# Patient Record
Sex: FEMALE | Race: WHITE | Employment: OTHER | URBAN - METROPOLITAN AREA
[De-identification: names, ages, dates, MRNs, and addresses within clinical notes are randomized per-mention and may not be internally consistent; named-entity substitution may affect disease eponyms.]

---

## 2021-06-28 ENCOUNTER — OFFICE VISIT (OUTPATIENT)
Dept: INTERNAL MEDICINE CLINIC | Facility: CLINIC | Age: 71
End: 2021-06-28
Payer: MEDICARE

## 2021-06-28 ENCOUNTER — LAB ENCOUNTER (OUTPATIENT)
Dept: LAB | Age: 71
End: 2021-06-28
Attending: INTERNAL MEDICINE
Payer: MEDICARE

## 2021-06-28 VITALS
HEART RATE: 108 BPM | DIASTOLIC BLOOD PRESSURE: 60 MMHG | OXYGEN SATURATION: 99 % | HEIGHT: 64.88 IN | SYSTOLIC BLOOD PRESSURE: 144 MMHG | TEMPERATURE: 99 F | BODY MASS INDEX: 33.22 KG/M2 | WEIGHT: 199.38 LBS | RESPIRATION RATE: 16 BRPM

## 2021-06-28 DIAGNOSIS — Z00.00 BLOOD TESTS FOR ROUTINE GENERAL PHYSICAL EXAMINATION: ICD-10-CM

## 2021-06-28 DIAGNOSIS — Z12.31 ENCOUNTER FOR SCREENING MAMMOGRAM FOR MALIGNANT NEOPLASM OF BREAST: ICD-10-CM

## 2021-06-28 DIAGNOSIS — R00.0 TACHYCARDIA: ICD-10-CM

## 2021-06-28 DIAGNOSIS — R19.03 RIGHT LOWER QUADRANT ABDOMINAL MASS: Primary | ICD-10-CM

## 2021-06-28 PROCEDURE — 99213 OFFICE O/P EST LOW 20 MIN: CPT | Performed by: INTERNAL MEDICINE

## 2021-06-28 PROCEDURE — 80061 LIPID PANEL: CPT

## 2021-06-28 PROCEDURE — 80053 COMPREHEN METABOLIC PANEL: CPT

## 2021-06-28 PROCEDURE — 83036 HEMOGLOBIN GLYCOSYLATED A1C: CPT

## 2021-06-28 PROCEDURE — 84443 ASSAY THYROID STIM HORMONE: CPT

## 2021-06-28 PROCEDURE — 85025 COMPLETE CBC W/AUTO DIFF WBC: CPT

## 2021-06-28 PROCEDURE — 36415 COLL VENOUS BLD VENIPUNCTURE: CPT

## 2021-06-28 NOTE — PATIENT INSTRUCTIONS
Please have blood tests done today  We have scheduled your mammogram and CT scan to evaluate the lump  We will call you with results  See me in 1 week for your routine physical

## 2021-06-28 NOTE — PROGRESS NOTES
Patient Office Visit    ASSESSMENT AND PLAN:   (R19.03) Right lower quadrant abdominal mass  (primary encounter diagnosis)  Plan: CT ABDOMEN(W+WO)PELVIS(CNTRST ONLY)(CPT=74178)  Note: highly suspicious for cancer. Likely GI.  Will order CT abdomen and pelvi Reflex To Free T4          Standing Status: Future          Standing Expiration Date: 6/28/2022      Lipid Panel          Standing Status: Future          Standing Expiration Date: 6/28/2022    Requested Prescriptions      No prescriptions requested or ord facility-administered medications on file prior to visit.         REVIEW OF SYSTEMS   Constitutional: has weight loss, no fatigue normal energy   HENT: normal sinuses and no mouth issues   Eyes: . normal vision no eye pain   Respiratory: normal respirations

## 2021-06-30 ENCOUNTER — HOSPITAL ENCOUNTER (OUTPATIENT)
Dept: MAMMOGRAPHY | Age: 71
Discharge: HOME | End: 2021-06-30
Attending: INTERNAL MEDICINE
Payer: MEDICARE

## 2021-06-30 ENCOUNTER — HOSPITAL ENCOUNTER (OUTPATIENT)
Dept: CT IMAGING | Age: 71
Discharge: HOME | End: 2021-06-30
Attending: INTERNAL MEDICINE
Payer: MEDICARE

## 2021-06-30 DIAGNOSIS — R19.03 RIGHT LOWER QUADRANT ABDOMINAL MASS: ICD-10-CM

## 2021-06-30 DIAGNOSIS — Z12.31 ENCOUNTER FOR SCREENING MAMMOGRAM FOR MALIGNANT NEOPLASM OF BREAST: ICD-10-CM

## 2021-06-30 PROCEDURE — 77063 BREAST TOMOSYNTHESIS BI: CPT | Performed by: INTERNAL MEDICINE

## 2021-06-30 PROCEDURE — 74178 CT ABD&PLV WO CNTR FLWD CNTR: CPT | Performed by: INTERNAL MEDICINE

## 2021-06-30 PROCEDURE — 77067 SCR MAMMO BI INCL CAD: CPT | Performed by: INTERNAL MEDICINE

## 2021-07-01 ENCOUNTER — TELEPHONE (OUTPATIENT)
Dept: INTERNAL MEDICINE CLINIC | Facility: CLINIC | Age: 71
End: 2021-07-01

## 2021-07-01 ENCOUNTER — TELEMEDICINE (OUTPATIENT)
Dept: INTERNAL MEDICINE CLINIC | Facility: CLINIC | Age: 71
End: 2021-07-01
Payer: MEDICARE

## 2021-07-01 DIAGNOSIS — R73.03 PREDIABETES: ICD-10-CM

## 2021-07-01 DIAGNOSIS — R19.03 RIGHT LOWER QUADRANT ABDOMINAL MASS: Primary | ICD-10-CM

## 2021-07-01 DIAGNOSIS — E78.2 MIXED HYPERLIPIDEMIA: ICD-10-CM

## 2021-07-01 PROCEDURE — 99213 OFFICE O/P EST LOW 20 MIN: CPT | Performed by: INTERNAL MEDICINE

## 2021-07-01 NOTE — TELEPHONE ENCOUNTER
Can you add patient in to my schedule (video visit) to discuss her CT results. I can talk to her at any time. I can call her too if she is unable to log in.     [facility] DO

## 2021-07-01 NOTE — PROGRESS NOTES
Virtual Telephone Check-In    This visit is conducted using Telemedicine with live, interactive video and audio.  Patient resides in [facility]   Patient understands and accepts financial responsibility for any deductible, co-insurance and/or co-pays associat discuss her test results.  She is anxious to know about her diagnosis  ROS:  General: Feels well overall  Skin: Denies any unusual skin lesions  Eyes: Denies blurred vision or double vision  All systems negative, except for above  Physical:  GENERAL: Alert

## 2021-07-01 NOTE — TELEPHONE ENCOUNTER
Can you let the patient know that I discussed with Dr. Karyn Duron and he recommends seeing Dr. Saleem Gonzalez instead so I have placed a referral with him    [address] [place] [address] DO

## 2021-07-01 NOTE — TELEPHONE ENCOUNTER
Please assist patient with scheduling per Dr. Aurelia Eid message below, TY  Preferably with an appointment for today if available

## 2021-07-01 NOTE — TELEPHONE ENCOUNTER
Patient scheduled     Patient can be reached at [phone]    Future Appointments   Date Time Provider Phuc Lama   7/6/2021 11:30 AM Dede Bidding, DO [facility] [place]

## 2021-07-06 ENCOUNTER — OFFICE VISIT (OUTPATIENT)
Dept: INTERNAL MEDICINE CLINIC | Facility: CLINIC | Age: 71
End: 2021-07-06
Payer: MEDICARE

## 2021-07-06 VITALS
RESPIRATION RATE: 16 BRPM | BODY MASS INDEX: 34.32 KG/M2 | HEART RATE: 100 BPM | TEMPERATURE: 98 F | OXYGEN SATURATION: 98 % | WEIGHT: 206 LBS | DIASTOLIC BLOOD PRESSURE: 68 MMHG | SYSTOLIC BLOOD PRESSURE: 132 MMHG | HEIGHT: 64.88 IN

## 2021-07-06 DIAGNOSIS — R01.1 HEART MURMUR: ICD-10-CM

## 2021-07-06 DIAGNOSIS — Z00.00 ROUTINE PHYSICAL EXAMINATION: Primary | ICD-10-CM

## 2021-07-06 DIAGNOSIS — E78.2 MIXED HYPERLIPIDEMIA: ICD-10-CM

## 2021-07-06 DIAGNOSIS — R19.03 RIGHT LOWER QUADRANT ABDOMINAL MASS: ICD-10-CM

## 2021-07-06 DIAGNOSIS — H61.22 HEARING LOSS OF LEFT EAR DUE TO CERUMEN IMPACTION: ICD-10-CM

## 2021-07-06 PROCEDURE — 99213 OFFICE O/P EST LOW 20 MIN: CPT | Performed by: INTERNAL MEDICINE

## 2021-07-06 PROCEDURE — G0439 PPPS, SUBSEQ VISIT: HCPCS | Performed by: INTERNAL MEDICINE

## 2021-07-06 NOTE — PROGRESS NOTES
Reviewed results with patient. Has an appointment with Dr. Liat Leigh.  [address] [place] [address]

## 2021-07-06 NOTE — PATIENT INSTRUCTIONS
You can schedule your echocardiogram when you have a chance to evaluate your slight murmur  Please follow up with Dr. Liat Leigh and I will keep an eye on his notes

## 2021-07-06 NOTE — PROGRESS NOTES
Patient Office Visit    ASSESSMENT AND PLAN:   (Z00.00) Routine physical examination  (primary encounter diagnosis)  Plan: reviewed recent blood tests results.  Will hold off on getting DEXA until seen by Dr. Ravi Kay    (R01.1) Heart murmur  Plan: CARD ECHO 2 APPENDECTOMY     • TOTAL ABDOM HYSTERECTOMY         Social History:  Social History    Tobacco Use      Smoking status: Never Smoker      Smokeless tobacco: Never Used    Vaping Use      Vaping Use: Never used    Alcohol use: Yes      Comment: social    Dr and LE, reflexes are normal  Skin: no rashes or bruises on visualized skin, not undressed   Psychiatric:normal mood

## 2021-07-07 ENCOUNTER — OFFICE VISIT (OUTPATIENT)
Dept: SURGERY | Facility: CLINIC | Age: 71
End: 2021-07-07
Payer: MEDICARE

## 2021-07-07 VITALS
DIASTOLIC BLOOD PRESSURE: 85 MMHG | SYSTOLIC BLOOD PRESSURE: 155 MMHG | HEART RATE: 109 BPM | TEMPERATURE: 98 F | BODY MASS INDEX: 33 KG/M2 | OXYGEN SATURATION: 98 % | WEIGHT: 199 LBS | RESPIRATION RATE: 16 BRPM

## 2021-07-07 DIAGNOSIS — R22.2 ABDOMINAL WALL MASS: Primary | ICD-10-CM

## 2021-07-07 PROCEDURE — 99204 OFFICE O/P NEW MOD 45 MIN: CPT | Performed by: SURGERY

## 2021-07-07 PROCEDURE — 88341 IMHCHEM/IMCYTCHM EA ADD ANTB: CPT | Performed by: SURGERY

## 2021-07-07 PROCEDURE — 88342 IMHCHEM/IMCYTCHM 1ST ANTB: CPT | Performed by: SURGERY

## 2021-07-07 PROCEDURE — 20206 BIOPSY MUSCLE PERQ NEEDLE: CPT | Performed by: SURGERY

## 2021-07-07 PROCEDURE — 88305 TISSUE EXAM BY PATHOLOGIST: CPT | Performed by: SURGERY

## 2021-07-07 NOTE — CONSULTS
[facility] Surgical Oncology        Patient Name:  Nelly Naranjo   YOB: 1950   Gender:  Female   Appt Date:  7/7/2021   Provider:  Robb Allen MD     PATIENT PROVIDERS  Primary Care Provider:Zoe Merida DO   Address[address]rectomy        Reviewed Social History:  Social History    Tobacco Use      Smoking status: Never Smoker      Smokeless tobacco: Never Used    Vaping Use      Vaping Use: Never used    Alcohol use: Yes      Comment: social    Drug use: Nev prepped and drapped in the usual sterile fashion. 1% lidocaine was used as local anesthetic. A #11 blade was used to puncture the skin.  The core needle was introduced into the lesion and multiple sections were obtained placed in formalin for histological a

## 2021-07-12 ENCOUNTER — OFFICE VISIT (OUTPATIENT)
Dept: INTERNAL MEDICINE CLINIC | Facility: CLINIC | Age: 71
End: 2021-07-12
Payer: MEDICARE

## 2021-07-12 ENCOUNTER — TELEPHONE (OUTPATIENT)
Dept: SURGERY | Facility: CLINIC | Age: 71
End: 2021-07-12

## 2021-07-12 VITALS
DIASTOLIC BLOOD PRESSURE: 66 MMHG | HEART RATE: 110 BPM | BODY MASS INDEX: 33.63 KG/M2 | SYSTOLIC BLOOD PRESSURE: 162 MMHG | WEIGHT: 199.38 LBS | RESPIRATION RATE: 16 BRPM | TEMPERATURE: 98 F | OXYGEN SATURATION: 100 % | HEIGHT: 64.75 IN

## 2021-07-12 DIAGNOSIS — C54.1 ENDOMETRIAL CARCINOMA (HCC): Primary | ICD-10-CM

## 2021-07-12 DIAGNOSIS — H61.22 IMPACTED CERUMEN OF LEFT EAR: Primary | ICD-10-CM

## 2021-07-12 DIAGNOSIS — R03.0 ELEVATED BP WITHOUT DIAGNOSIS OF HYPERTENSION: ICD-10-CM

## 2021-07-12 PROCEDURE — 99213 OFFICE O/P EST LOW 20 MIN: CPT | Performed by: INTERNAL MEDICINE

## 2021-07-12 NOTE — PROGRESS NOTES
Patient Office Visit    ASSESSMENT AND PLAN:   (H61.22) Impacted cerumen of left ear  (primary encounter diagnosis)  Plan: ENT - INTERNAL  Note: bleeding noted after ear wash, referral to ENT placed     (R03.0) Elevated BP without diagnosis of hypertension Problem Relation Age of Onset   • Other (lung cancer) Father      Allergies:  No Known Allergies  Current Meds:  No current outpatient medications on file prior to visit. No current facility-administered medications on file prior to visit.         REVIEW

## 2021-07-12 NOTE — TELEPHONE ENCOUNTER
PATH: atypical endometrioid proliferation,  suspicious for endometrioid adenocarcinoma. Called patient and left message for her to call us back. We will further discuss at our upcoming tumor board. CT chest and  ordered.

## 2021-07-13 ENCOUNTER — HOSPITAL ENCOUNTER (OUTPATIENT)
Dept: CT IMAGING | Age: 71
Discharge: HOME | End: 2021-07-13
Attending: SURGERY
Payer: MEDICARE

## 2021-07-13 ENCOUNTER — HOSPITAL ENCOUNTER (OUTPATIENT)
Facility: HOSPITAL | Age: 71
Setting detail: OBSERVATION
Discharge: HOME | End: 2021-07-14
Attending: EMERGENCY MEDICINE | Admitting: HOSPITALIST
Payer: MEDICARE

## 2021-07-13 ENCOUNTER — APPOINTMENT (OUTPATIENT)
Dept: ULTRASOUND IMAGING | Facility: HOSPITAL | Age: 71
End: 2021-07-13
Attending: EMERGENCY MEDICINE
Payer: MEDICARE

## 2021-07-13 ENCOUNTER — TELEPHONE (OUTPATIENT)
Dept: SURGERY | Facility: CLINIC | Age: 71
End: 2021-07-13

## 2021-07-13 ENCOUNTER — LAB ENCOUNTER (OUTPATIENT)
Dept: LAB | Age: 71
End: 2021-07-13
Attending: SURGERY
Payer: MEDICARE

## 2021-07-13 DIAGNOSIS — I26.99 ACUTE PULMONARY EMBOLISM, UNSPECIFIED PULMONARY EMBOLISM TYPE, UNSPECIFIED WHETHER ACUTE COR PULMONALE PRESENT (HCC): Primary | ICD-10-CM

## 2021-07-13 DIAGNOSIS — C54.1 ENDOMETRIAL CARCINOMA (HCC): ICD-10-CM

## 2021-07-13 LAB
ALBUMIN SERPL-MCNC: 3.9 G/DL (ref 3.4–5)
ALBUMIN/GLOB SERPL: 1 {RATIO} (ref 1–2)
ALP LIVER SERPL-CCNC: 69 U/L
ALT SERPL-CCNC: 16 U/L
ANION GAP SERPL CALC-SCNC: 6 MMOL/L (ref 0–18)
APTT PPP: 33.7 SECONDS (ref 25.4–36.1)
AST SERPL-CCNC: 11 U/L (ref 15–37)
BASOPHILS # BLD AUTO: 0.09 X10(3) UL (ref 0–0.2)
BASOPHILS NFR BLD AUTO: 0.7 %
BILIRUB SERPL-MCNC: 0.5 MG/DL (ref 0.1–2)
BUN BLD-MCNC: 13 MG/DL (ref 7–18)
BUN/CREAT SERPL: 16.3 (ref 10–20)
CALCIUM BLD-MCNC: 8.9 MG/DL (ref 8.5–10.1)
CANCER AG125 SERPL-ACNC: 93.6 U/ML (ref ?–35)
CHLORIDE SERPL-SCNC: 108 MMOL/L (ref 98–112)
CO2 SERPL-SCNC: 24 MMOL/L (ref 21–32)
CREAT BLD-MCNC: 0.8 MG/DL
DEPRECATED RDW RBC AUTO: 40.9 FL (ref 35.1–46.3)
EOSINOPHIL # BLD AUTO: 0.16 X10(3) UL (ref 0–0.7)
EOSINOPHIL NFR BLD AUTO: 1.2 %
ERYTHROCYTE [DISTWIDTH] IN BLOOD BY AUTOMATED COUNT: 13.2 % (ref 11–15)
GLOBULIN PLAS-MCNC: 3.9 G/DL (ref 2.8–4.4)
GLUCOSE BLD-MCNC: 141 MG/DL (ref 70–99)
HCT VFR BLD AUTO: 38.9 %
HGB BLD-MCNC: 12.3 G/DL
IMM GRANULOCYTES # BLD AUTO: 0.06 X10(3) UL (ref 0–1)
IMM GRANULOCYTES NFR BLD: 0.4 %
INR BLD: 1.11 (ref 0.89–1.11)
LYMPHOCYTES # BLD AUTO: 1.8 X10(3) UL (ref 1–4)
LYMPHOCYTES NFR BLD AUTO: 13.4 %
M PROTEIN MFR SERPL ELPH: 7.8 G/DL (ref 6.4–8.2)
MCH RBC QN AUTO: 26.7 PG (ref 26–34)
MCHC RBC AUTO-ENTMCNC: 31.6 G/DL (ref 31–37)
MCV RBC AUTO: 84.4 FL
MONOCYTES # BLD AUTO: 0.81 X10(3) UL (ref 0.1–1)
MONOCYTES NFR BLD AUTO: 6 %
NEUTROPHILS # BLD AUTO: 10.54 X10 (3) UL (ref 1.5–7.7)
NEUTROPHILS # BLD AUTO: 10.54 X10(3) UL (ref 1.5–7.7)
NEUTROPHILS NFR BLD AUTO: 78.3 %
NT-PROBNP SERPL-MCNC: 196 PG/ML (ref ?–125)
OSMOLALITY SERPL CALC.SUM OF ELEC: 288 MOSM/KG (ref 275–295)
PLATELET # BLD AUTO: 282 10(3)UL (ref 150–450)
POTASSIUM SERPL-SCNC: 3.6 MMOL/L (ref 3.5–5.1)
PSA SERPL DL<=0.01 NG/ML-MCNC: 14.6 SECONDS (ref 12.4–14.6)
RBC # BLD AUTO: 4.61 X10(6)UL
SODIUM SERPL-SCNC: 138 MMOL/L (ref 136–145)
TROPONIN I SERPL-MCNC: <0.045 NG/ML (ref ?–0.04)
WBC # BLD AUTO: 13.5 X10(3) UL (ref 4–11)

## 2021-07-13 PROCEDURE — 36415 COLL VENOUS BLD VENIPUNCTURE: CPT

## 2021-07-13 PROCEDURE — 86304 IMMUNOASSAY TUMOR CA 125: CPT

## 2021-07-13 PROCEDURE — 71260 CT THORAX DX C+: CPT | Performed by: SURGERY

## 2021-07-13 PROCEDURE — 99220 INITIAL OBSERVATION CARE,LEVL III: CPT | Performed by: INTERNAL MEDICINE

## 2021-07-13 PROCEDURE — 93970 EXTREMITY STUDY: CPT | Performed by: EMERGENCY MEDICINE

## 2021-07-13 RX ORDER — METOCLOPRAMIDE HYDROCHLORIDE 5 MG/ML
10 INJECTION INTRAMUSCULAR; INTRAVENOUS EVERY 8 HOURS PRN
Status: DISCONTINUED | OUTPATIENT
Start: 2021-07-13 | End: 2021-07-14

## 2021-07-13 RX ORDER — IBUPROFEN 200 MG
200 TABLET ORAL EVERY 6 HOURS PRN
Status: ON HOLD | COMMUNITY
End: 2021-07-14

## 2021-07-13 RX ORDER — ENOXAPARIN SODIUM 100 MG/ML
1 INJECTION SUBCUTANEOUS ONCE
Status: COMPLETED | OUTPATIENT
Start: 2021-07-13 | End: 2021-07-13

## 2021-07-13 RX ORDER — ONDANSETRON 2 MG/ML
4 INJECTION INTRAMUSCULAR; INTRAVENOUS EVERY 6 HOURS PRN
Status: DISCONTINUED | OUTPATIENT
Start: 2021-07-13 | End: 2021-07-14

## 2021-07-13 RX ORDER — ENOXAPARIN SODIUM 100 MG/ML
1 INJECTION SUBCUTANEOUS
Status: DISCONTINUED | OUTPATIENT
Start: 2021-07-14 | End: 2021-07-14

## 2021-07-13 RX ORDER — ACETAMINOPHEN 325 MG/1
650 TABLET ORAL EVERY 6 HOURS PRN
Status: DISCONTINUED | OUTPATIENT
Start: 2021-07-13 | End: 2021-07-14

## 2021-07-13 RX ORDER — POLYETHYLENE GLYCOL 3350 17 G/17G
17 POWDER, FOR SOLUTION ORAL DAILY PRN
Status: DISCONTINUED | OUTPATIENT
Start: 2021-07-13 | End: 2021-07-14

## 2021-07-13 RX ORDER — ASPIRIN 325 MG
325 TABLET, DELAYED RELEASE (ENTERIC COATED) ORAL DAILY PRN
Status: ON HOLD | COMMUNITY
End: 2021-07-14

## 2021-07-13 RX ORDER — ONDANSETRON 2 MG/ML
4 INJECTION INTRAMUSCULAR; INTRAVENOUS EVERY 4 HOURS PRN
Status: ACTIVE | OUTPATIENT
Start: 2021-07-13 | End: 2021-07-13

## 2021-07-13 RX ORDER — MELATONIN
3 NIGHTLY PRN
Status: DISCONTINUED | OUTPATIENT
Start: 2021-07-13 | End: 2021-07-14

## 2021-07-13 RX ORDER — DOCUSATE SODIUM 100 MG/1
100 CAPSULE, LIQUID FILLED ORAL 2 TIMES DAILY PRN
Status: DISCONTINUED | OUTPATIENT
Start: 2021-07-13 | End: 2021-07-14

## 2021-07-13 NOTE — ED PROVIDER NOTES
Patient Seen in: [facility] Emergency Department      History   Patient presents with:  Abnormal Labs    Stated Complaint: pulmonary embolism dx by CT scan this am     HPI/Subjective:   HPI    19-year-old female with recent diagnosis of abdominal wa Mouth/Throat:      Mouth: Mucous membranes are moist.   Eyes:      Extraocular Movements: Extraocular movements intact. Pupils: Pupils are equal, round, and reactive to light. Cardiovascular:      Rate and Rhythm: Regular rhythm.  Tachycardia present Please view results for these tests on the individual orders. RAINBOW DRAW BLUE   RAINBOW DRAW LAVENDER   RAINBOW DRAW LIGHT GREEN   RAINBOW DRAW GOLD     EKG    Rate, intervals and axes as noted on EKG Report.   Rate: 99  Rhythm: Sinus Rhythm  Readin vertebral bodies. .   CONCLUSION:  3 mm ground-glass nodule in the right upper lobe. Enlarged heterogeneous thyroid gland with calcification in the left lobe. Patient may benefit from follow-up with dedicated thyroid ultrasound.   No enlarged mediastinal o PLEURA:  No pneumothorax or effusion. THORACIC AORTA:  No aneurysm. CHEST WALL:  No enlarged axillary adenopathy.   LIMITED ABDOMEN:  Stable when compared to 6/30/2021 BONES:  Hypertrophic degenerative changes thoracic spine with multilevel endplate spurr dilatation, or atrophy. SPLEEN:  No enlargement or focal lesion. KIDNEYS:  No mass, obstruction, or calcification. ADRENALS:  No mass or enlargement. AORTA/VASCULAR:  No aneurysm. RETROPERITONEUM:  No mass or adenopathy.   BOWEL/MESENTERY:  No visible Routine CC, MLO, and XCCL views of both breasts were obtained. Standard 2D and additional multiplane thin sections of the breasts were obtained for the purpose of Tomosynthesis evaluation.   The images were reconstructed and reviewed on the dedicated Tomos admission. A total of 30 minutes of critical care time (exclusive of billable procedures) was administered to manage the patient's critical imaging findings due to her pulmonary embolism.   This involved direct patient intervention, complex decision dylan

## 2021-07-13 NOTE — TELEPHONE ENCOUNTER
Spoke to patient and informed her of CT chest result of a pulmonary embolism. Informed her to go to ED for further evaluation. All patient's questions answered and pt stated understanding.

## 2021-07-13 NOTE — TELEPHONE ENCOUNTER
LVM and called patient on all lines listed with no answer. Calling patient to inform her of critical CT result. Will continue reaching out to patient.

## 2021-07-13 NOTE — H&P
[facility]  History and Physical     Sesar Longest Patient Status:  Emergency    6/3/1950 MRN [id number]   Location [address] [facility] Attending Angelica Sifuentes Day # 0 PCP Sonal Corrinne Bjornstad, DO     Chief Oral Tab, Take 200 mg by mouth every 6 (six) hours as needed for Pain or Fever., Disp: , Rfl:   aspirin  MG Oral Tab EC, Take 325 mg by mouth daily as needed for Pain., Disp: , Rfl:       Review of Systems:   A comprehensive 14 point review of system hours.    Inflammatory Markers  No results for input(s): CRP, ALY, LDH, DDIMER in the last 168 hours. Imaging: Imaging data reviewed in Epic. ASSESSMENT / PLAN:     1. Right upper lobe pulmonary embolism  1.  Full dose Lovenox which is okay with Dr. Xuan Carrillo

## 2021-07-14 ENCOUNTER — APPOINTMENT (OUTPATIENT)
Dept: CV DIAGNOSTICS | Facility: HOSPITAL | Age: 71
End: 2021-07-14
Attending: INTERNAL MEDICINE
Payer: MEDICARE

## 2021-07-14 VITALS
DIASTOLIC BLOOD PRESSURE: 56 MMHG | HEIGHT: 65 IN | RESPIRATION RATE: 16 BRPM | OXYGEN SATURATION: 96 % | BODY MASS INDEX: 32.8 KG/M2 | HEART RATE: 88 BPM | WEIGHT: 196.88 LBS | SYSTOLIC BLOOD PRESSURE: 114 MMHG | TEMPERATURE: 99 F

## 2021-07-14 LAB
ANION GAP SERPL CALC-SCNC: 4 MMOL/L (ref 0–18)
BASOPHILS # BLD AUTO: 0.09 X10(3) UL (ref 0–0.2)
BASOPHILS NFR BLD AUTO: 1 %
BUN BLD-MCNC: 12 MG/DL (ref 7–18)
BUN/CREAT SERPL: 16.7 (ref 10–20)
CALCIUM BLD-MCNC: 8.6 MG/DL (ref 8.5–10.1)
CHLORIDE SERPL-SCNC: 109 MMOL/L (ref 98–112)
CO2 SERPL-SCNC: 28 MMOL/L (ref 21–32)
CREAT BLD-MCNC: 0.72 MG/DL
D-DIMER: 3.87 UG/ML FEU (ref ?–0.71)
DEPRECATED RDW RBC AUTO: 41.7 FL (ref 35.1–46.3)
EOSINOPHIL # BLD AUTO: 0.28 X10(3) UL (ref 0–0.7)
EOSINOPHIL NFR BLD AUTO: 3 %
ERYTHROCYTE [DISTWIDTH] IN BLOOD BY AUTOMATED COUNT: 13.2 % (ref 11–15)
GLUCOSE BLD-MCNC: 107 MG/DL (ref 70–99)
HCT VFR BLD AUTO: 35 %
HGB BLD-MCNC: 11 G/DL
IMM GRANULOCYTES # BLD AUTO: 0.03 X10(3) UL (ref 0–1)
IMM GRANULOCYTES NFR BLD: 0.3 %
LYMPHOCYTES # BLD AUTO: 2.43 X10(3) UL (ref 1–4)
LYMPHOCYTES NFR BLD AUTO: 26.2 %
MCH RBC QN AUTO: 27 PG (ref 26–34)
MCHC RBC AUTO-ENTMCNC: 31.4 G/DL (ref 31–37)
MCV RBC AUTO: 86 FL
MONOCYTES # BLD AUTO: 0.71 X10(3) UL (ref 0.1–1)
MONOCYTES NFR BLD AUTO: 7.6 %
NEUTROPHILS # BLD AUTO: 5.75 X10 (3) UL (ref 1.5–7.7)
NEUTROPHILS # BLD AUTO: 5.75 X10(3) UL (ref 1.5–7.7)
NEUTROPHILS NFR BLD AUTO: 61.9 %
OSMOLALITY SERPL CALC.SUM OF ELEC: 292 MOSM/KG (ref 275–295)
PLATELET # BLD AUTO: 233 10(3)UL (ref 150–450)
POTASSIUM SERPL-SCNC: 3.9 MMOL/L (ref 3.5–5.1)
RBC # BLD AUTO: 4.07 X10(6)UL
SODIUM SERPL-SCNC: 141 MMOL/L (ref 136–145)
WBC # BLD AUTO: 9.3 X10(3) UL (ref 4–11)

## 2021-07-14 PROCEDURE — 93306 TTE W/DOPPLER COMPLETE: CPT | Performed by: INTERNAL MEDICINE

## 2021-07-14 PROCEDURE — 99205 OFFICE O/P NEW HI 60 MIN: CPT | Performed by: INTERNAL MEDICINE

## 2021-07-14 PROCEDURE — 99217 OBSERVATION CARE DISCHARGE: CPT | Performed by: INTERNAL MEDICINE

## 2021-07-14 RX ORDER — ENOXAPARIN SODIUM 100 MG/ML
1 INJECTION SUBCUTANEOUS 2 TIMES DAILY
Qty: 53.4 ML | Refills: 0 | Status: SHIPPED | OUTPATIENT
Start: 2021-07-14 | End: 2021-07-28

## 2021-07-14 NOTE — CONSULTS
[facility] Hematology and Oncology Consult Note    Reason for Consult: PE, Recurrent Endometrial cancer  Medical Record Number: [id number]   CSN: [id number]   Referring Physician: No ref.  provider found  PCP: Martin Villa DO    History of Present Illness (MIRALAX) powder packet 17 g, 17 g, Oral, Daily PRN    Past Medical History:   Diagnosis Date   • Cancer ([facility] [address]     abdominal tumor   • Hearing impairment    • Pulmonary embolism [facility])      Past Surgical History:   Procedure Laterality Date   • APPENDECTOMY likely a soft tissue neoplasm and biopsy is recommended. 2. There is regional enlarged lymph node in right external iliac region. 3. There is a soft tissue mass within rectus sheath in the left lower quadrant which could be a metastasis as well.    4. T BID recommended until surgery  · Recommend temporary IVC filter since VTE is relatively acute  · After surgery, can consider switching over to Xarelto   · Duration of anticoagulation will be dictated by cancer outcomes and BMI  · Avoid hormonal agents, pacheco

## 2021-07-14 NOTE — PROGRESS NOTES
07/13/21 2003 07/13/21 2005 07/13/21 2006   Vital Signs   Resp 20 20 20   Respiratory Quality Normal Normal Normal   /63 119/84 104/64   BP Location Left arm Left arm Left arm   BP Method Automatic Automatic Automatic   Patient Position Lying Sitt

## 2021-07-14 NOTE — PROGRESS NOTES
[facility]  Progress Note     [place] Patient Status:  Observation    6/3/1950 MRN [id number]   [facility] 2NE-A Attending Jose Maria Long MD   Hosp Day # 0 PCP Zoe Cartwright DO     Chief Complaint: PE    S: Patient re COVID19    Pro-Calcitonin  No results for input(s): PCT in the last 168 hours. Cardiac  Recent Labs   Lab 07/13/21  1359   TROP <0.045   PBNP 196*       Creatinine Kinase  No results for input(s): CK in the last 168 hours.     Inflammatory Markers  No re

## 2021-07-14 NOTE — PROGRESS NOTES
Assumed care of the patient at 2000. Patient alert and oriented x4. Adequate saturation on RA. LBM 7/13. Denies any pain. No SOB, N/V/D. Call light within reach.

## 2021-07-14 NOTE — PLAN OF CARE
Pt received at 0730 resting in bed. A&Ox4. On room air saturating >95%. Sinus rhythm on the monitor. Up with standby assist. No c/o pain or shortness of breath. Hematology consult to see today, paged Dr. Flash Howard for new consult. Subcutaneous lovenox BID.  Edu

## 2021-07-14 NOTE — CM/SW NOTE
Call placed to patient's [facility] [phone] to check on cost of lovenox that was electronically sent earlier toay. Cost with insurance $146. oo; pharmacy open till 9:00pm today

## 2021-07-14 NOTE — DISCHARGE SUMMARY
[facility] HOSPITALIST  DISCHARGE SUMMARY     Arianna Veras Patient Status:  Observation    6/3/1950 MRN [id number]   [facility] 2NE-A Attending No att. providers found   Hosp Day # 0 PCP Zoe Diaz DO     Date of Admission: 20 follow up with hematology and surgical oncology as outpatient. Lace+ Score: 40  59-90 High Risk  29-58 Medium Risk  0-28   Low Risk         TCM Follow-Up Recommendation:  LACE > 58:  High Risk of readmission after discharge from the hospital.  [facility] INSTRUCTIONS: See electronic chart    Tiffany Fernández MD    Time spent:  > 30 minutes

## 2021-07-15 LAB
ATRIAL RATE: 99 BPM
P AXIS: 14 DEGREES
P-R INTERVAL: 162 MS
Q-T INTERVAL: 334 MS
QRS DURATION: 82 MS
QTC CALCULATION (BEZET): 428 MS
R AXIS: -36 DEGREES
T AXIS: 36 DEGREES
VENTRICULAR RATE: 99 BPM

## 2021-07-27 NOTE — PROGRESS NOTES
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 Interval History:   7/28/21: Follow up for a RUL PE (dx 7/13/21) suspected to be 2/2 malignancy. She is currently on lovenox 90 mg BID given upcoming surgery.  She is injecting in her Left abdomen and has some hematoma scleral icterus  CV: RRR S1S2 no murmurs  Extremities: No edema   Lungs: CTAB, no increased work of breathing  Abd: ~20-30 cm RLQ mass.  Hematomas in LLQ  Neuro: CN: II-XII grossly intact      Results:  Lab Results   Component Value Date    WBC 9.3 07/14/20 proliferation with focal squamous morules.   Immunostains for estrogen receptor (ER) and GATA3 were performed.  The lesional cells are positive for ER and negative for GATA3.     The histologic and immunohistochemical findings are consistent with an atypic

## 2021-07-28 ENCOUNTER — OFFICE VISIT (OUTPATIENT)
Dept: SURGERY | Facility: CLINIC | Age: 71
End: 2021-07-28
Payer: MEDICARE

## 2021-07-28 ENCOUNTER — OFFICE VISIT (OUTPATIENT)
Dept: HEMATOLOGY/ONCOLOGY | Facility: HOSPITAL | Age: 71
End: 2021-07-28
Attending: INTERNAL MEDICINE
Payer: MEDICARE

## 2021-07-28 ENCOUNTER — TELEPHONE (OUTPATIENT)
Dept: SURGERY | Facility: CLINIC | Age: 71
End: 2021-07-28

## 2021-07-28 VITALS
OXYGEN SATURATION: 97 % | TEMPERATURE: 99 F | BODY MASS INDEX: 33 KG/M2 | HEART RATE: 115 BPM | WEIGHT: 195.38 LBS | RESPIRATION RATE: 16 BRPM | SYSTOLIC BLOOD PRESSURE: 146 MMHG | DIASTOLIC BLOOD PRESSURE: 84 MMHG

## 2021-07-28 VITALS
WEIGHT: 197 LBS | SYSTOLIC BLOOD PRESSURE: 148 MMHG | TEMPERATURE: 98 F | DIASTOLIC BLOOD PRESSURE: 88 MMHG | OXYGEN SATURATION: 99 % | BODY MASS INDEX: 33 KG/M2 | HEART RATE: 111 BPM | RESPIRATION RATE: 16 BRPM

## 2021-07-28 DIAGNOSIS — C79.9 METASTASIS FROM MALIGNANT NEOPLASM OF UTERUS (HCC): Primary | ICD-10-CM

## 2021-07-28 DIAGNOSIS — C79.89 SECONDARY MALIGNANT NEOPLASM OF SOFT TISSUES OF ABDOMEN (HCC): ICD-10-CM

## 2021-07-28 DIAGNOSIS — I26.99 ACUTE PULMONARY EMBOLISM, UNSPECIFIED PULMONARY EMBOLISM TYPE, UNSPECIFIED WHETHER ACUTE COR PULMONALE PRESENT (HCC): Primary | ICD-10-CM

## 2021-07-28 DIAGNOSIS — C55 METASTASIS FROM MALIGNANT NEOPLASM OF UTERUS (HCC): Primary | ICD-10-CM

## 2021-07-28 PROCEDURE — 99214 OFFICE O/P EST MOD 30 MIN: CPT | Performed by: INTERNAL MEDICINE

## 2021-07-28 PROCEDURE — 1111F DSCHRG MED/CURRENT MED MERGE: CPT | Performed by: SURGERY

## 2021-07-28 PROCEDURE — 99214 OFFICE O/P EST MOD 30 MIN: CPT | Performed by: SURGERY

## 2021-07-28 RX ORDER — ENOXAPARIN SODIUM 100 MG/ML
1 INJECTION SUBCUTANEOUS 2 TIMES DAILY
Qty: 53.4 ML | Refills: 0 | Status: ON HOLD | OUTPATIENT
Start: 2021-07-28 | End: 2021-08-11

## 2021-07-28 NOTE — H&P (VIEW-ONLY)
[facility] Surgical Oncology        Patient Name:  Cristy Block   YOB: 1950   Gender:  Female   Appt Date:  7/28/2021   Provider:  Milady Clements MD     PATIENT PROVIDERS  Primary Care Provider:Zoe Bhat DO   Address: [address]  Current Outpatient Medications:   •  enoxaparin 100 MG/ML Subcutaneous Solution, Inject 0.89 mL (89 mg total) into the skin 2 (two) times daily. , Disp: 53.4 mL, Rfl: 0     Allergies Reviewed:  No Known Allergies     History:  Reviewed:  Past Medical Histor jaundice. Document Review:  Final Diagnosis:   Abdominal wall mass, biopsy:  -Atypical endometrioid proliferation.           Procedure(s):  None     Assessment / Plan:  Uterine carcinoma metastatic to abdominal wall    Findings were revisited with the

## 2021-07-28 NOTE — H&P
[facility]        Patient Name:  Brian Goodwin   YOB: 1950   Gender:  Female   Appt Date:  7/28/2021   Provider:  Dionte Parker MD     PATIENT PROVIDERS  Primary Care Provider:Zoe Cruz DO   Address: [address]  Current Outpatient Medications:   •  enoxaparin 100 MG/ML Subcutaneous Solution, Inject 0.89 mL (89 mg total) into the skin 2 (two) times daily. , Disp: 53.4 mL, Rfl: 0     Allergies Reviewed:  No Known Allergies     History:  Reviewed:  Past Medical Histor jaundice. Document Review:  Final Diagnosis:   Abdominal wall mass, biopsy:  -Atypical endometrioid proliferation.           Procedure(s):  None     Assessment / Plan:  Uterine carcinoma metastatic to abdominal wall    Findings were revisited with the

## 2021-07-28 NOTE — TELEPHONE ENCOUNTER
Spoke with patient and gave her a surgery date of August 10, 2021. I let her know they would be calling her with a time.   I also informed her that she would have the IVC filter placed on August 5 and someone would reach out to her with further details per

## 2021-07-29 ENCOUNTER — TELEPHONE (OUTPATIENT)
Dept: HEMATOLOGY/ONCOLOGY | Facility: HOSPITAL | Age: 71
End: 2021-07-29

## 2021-07-29 DIAGNOSIS — C55 METASTASIS FROM MALIGNANT NEOPLASM OF UTERUS (HCC): Primary | ICD-10-CM

## 2021-07-29 DIAGNOSIS — C79.89 SECONDARY MALIGNANT NEOPLASM OF SOFT TISSUES OF ABDOMEN (HCC): ICD-10-CM

## 2021-07-29 DIAGNOSIS — C79.9 METASTASIS FROM MALIGNANT NEOPLASM OF UTERUS (HCC): Primary | ICD-10-CM

## 2021-07-29 NOTE — TELEPHONE ENCOUNTER
Sheila calling regarding prescription   enoxaparin 100 MG/ML Subcutaneous Solution 53.4 mL     States it requires a Pre Authorization.     To make Urgent  For quanties over ride    [address]  [address]  [id number]

## 2021-07-30 ENCOUNTER — TELEPHONE (OUTPATIENT)
Dept: HEMATOLOGY/ONCOLOGY | Facility: HOSPITAL | Age: 71
End: 2021-07-30

## 2021-07-30 NOTE — TELEPHONE ENCOUNTER
[facility] Pharmacist told patient to put .9 into the syringe. But patient has been injecting the entire amount. Patient did not remember being told to not use the entire amount.     Patient missed morning shot because she cannot  prescription from [facility]

## 2021-07-30 NOTE — TELEPHONE ENCOUNTER
Mallory Torres RN  P Edw Tom Noah Rns         Previous Messages       ----- Message -----   From: Enid Vigil RN   Sent: 7/30/2021   9:24 AM CDT   To: Dara Kulkarni RN     Hi Joie, it's me again with the same patient. Roverto Soto she keeps tellin

## 2021-08-03 ENCOUNTER — TELEPHONE (OUTPATIENT)
Dept: INTERNAL MEDICINE CLINIC | Facility: CLINIC | Age: 71
End: 2021-08-03

## 2021-08-03 NOTE — TELEPHONE ENCOUNTER
Pt scheduled for pre-op clearance on 8/4/21.  Paperwork and admission testing info in purple pre-op folder

## 2021-08-04 ENCOUNTER — ANESTHESIA EVENT (OUTPATIENT)
Dept: SURGERY | Facility: HOSPITAL | Age: 71
DRG: 749 | End: 2021-08-04
Payer: MEDICARE

## 2021-08-04 ENCOUNTER — OFFICE VISIT (OUTPATIENT)
Dept: INTERNAL MEDICINE CLINIC | Facility: CLINIC | Age: 71
End: 2021-08-04
Payer: MEDICARE

## 2021-08-04 ENCOUNTER — LABORATORY ENCOUNTER (OUTPATIENT)
Dept: LAB | Age: 71
End: 2021-08-04
Attending: SURGERY
Payer: MEDICARE

## 2021-08-04 ENCOUNTER — TELEPHONE (OUTPATIENT)
Dept: INTERNAL MEDICINE CLINIC | Facility: CLINIC | Age: 71
End: 2021-08-04

## 2021-08-04 VITALS
SYSTOLIC BLOOD PRESSURE: 128 MMHG | WEIGHT: 196 LBS | DIASTOLIC BLOOD PRESSURE: 72 MMHG | HEIGHT: 65 IN | HEART RATE: 110 BPM | TEMPERATURE: 98 F | BODY MASS INDEX: 32.65 KG/M2 | RESPIRATION RATE: 16 BRPM | OXYGEN SATURATION: 96 %

## 2021-08-04 DIAGNOSIS — Z01.818 PREOP EXAM FOR INTERNAL MEDICINE: Primary | ICD-10-CM

## 2021-08-04 DIAGNOSIS — R73.03 PREDIABETES: ICD-10-CM

## 2021-08-04 DIAGNOSIS — C79.9 METASTASIS FROM MALIGNANT NEOPLASM OF UTERUS (HCC): ICD-10-CM

## 2021-08-04 DIAGNOSIS — I26.99 OTHER ACUTE PULMONARY EMBOLISM WITHOUT ACUTE COR PULMONALE (HCC): ICD-10-CM

## 2021-08-04 DIAGNOSIS — E78.2 MIXED HYPERLIPIDEMIA: ICD-10-CM

## 2021-08-04 DIAGNOSIS — C55 METASTASIS FROM MALIGNANT NEOPLASM OF UTERUS (HCC): ICD-10-CM

## 2021-08-04 PROBLEM — R22.2 ABDOMINAL WALL MASS: Status: RESOLVED | Noted: 2021-07-01 | Resolved: 2021-08-04

## 2021-08-04 LAB
ANTIBODY SCREEN: NEGATIVE
RH BLOOD TYPE: POSITIVE

## 2021-08-04 PROCEDURE — 99214 OFFICE O/P EST MOD 30 MIN: CPT | Performed by: INTERNAL MEDICINE

## 2021-08-04 PROCEDURE — 86850 RBC ANTIBODY SCREEN: CPT

## 2021-08-04 PROCEDURE — 86901 BLOOD TYPING SEROLOGIC RH(D): CPT

## 2021-08-04 PROCEDURE — 86900 BLOOD TYPING SEROLOGIC ABO: CPT

## 2021-08-04 NOTE — PROGRESS NOTES
Preoperative evaluation    Patient presents with:  Pre-Op: 8/10 Dr. Jason Chapa - resection of R lower abdomen      Oneida Dotson is a 70year old female who presents for a pre-operative physical exam.   HPI related to surgery:   She  has had previous anesthes use: Never    PHYSICAL EXAM:   /72   Pulse 110   Temp 98 °F (36.7 °C)   Resp 16   Ht 5' 5\" (1.651 m)   Wt 196 lb (88.9 kg)   SpO2 96%   BMI 32.62 kg/m²    GENERAL: Alert and oriented, well developed, well nourished,in no apparent distress  SKIN: no with oncology, Dr. Mingo Gregg     (E78.2) Mixed hyperlipidemia  Plan: diet controlled     (R73.03) Prediabetes  Plan: diet controlled       Patient Care Team:  Jhon Alvarenga DO as PCP - General (Internal Medicine)  Lydia Vazquez MD (Surgical Oncology)

## 2021-08-04 NOTE — PAT NURSING NOTE
Chart reviewed by anesthesiologist, Dr. Fatou Guzman for abnormal EKG. Received an order for medical clearance. Faxed this request to the surgeon and Dr. Erika Ohara. Received fax confirmation. Patient has an appointment with Dr. Foy Jeans today for medical clearance.

## 2021-08-05 ENCOUNTER — HOSPITAL ENCOUNTER (OUTPATIENT)
Dept: INTERVENTIONAL RADIOLOGY/VASCULAR | Facility: HOSPITAL | Age: 71
Discharge: HOME | End: 2021-08-05
Attending: INTERNAL MEDICINE | Admitting: INTERNAL MEDICINE
Payer: MEDICARE

## 2021-08-05 VITALS
DIASTOLIC BLOOD PRESSURE: 42 MMHG | SYSTOLIC BLOOD PRESSURE: 100 MMHG | HEART RATE: 88 BPM | RESPIRATION RATE: 23 BRPM | OXYGEN SATURATION: 100 % | TEMPERATURE: 99 F

## 2021-08-05 DIAGNOSIS — I26.99 ACUTE PULMONARY EMBOLISM, UNSPECIFIED PULMONARY EMBOLISM TYPE, UNSPECIFIED WHETHER ACUTE COR PULMONALE PRESENT (HCC): ICD-10-CM

## 2021-08-05 LAB — INR: 1.1 (ref 0.8–1.3)

## 2021-08-05 PROCEDURE — 06H03DZ INSERTION OF INTRALUMINAL DEVICE INTO INFERIOR VENA CAVA, PERCUTANEOUS APPROACH: ICD-10-PCS | Performed by: RADIOLOGY

## 2021-08-05 PROCEDURE — 37191 INS ENDOVAS VENA CAVA FILTR: CPT

## 2021-08-05 PROCEDURE — 85610 PROTHROMBIN TIME: CPT

## 2021-08-05 PROCEDURE — 99152 MOD SED SAME PHYS/QHP 5/>YRS: CPT

## 2021-08-05 PROCEDURE — 99153 MOD SED SAME PHYS/QHP EA: CPT

## 2021-08-05 RX ORDER — ACETAMINOPHEN 325 MG/1
650 TABLET ORAL EVERY 6 HOURS PRN
Status: DISCONTINUED | OUTPATIENT
Start: 2021-08-05 | End: 2021-08-05

## 2021-08-05 RX ORDER — DIPHENHYDRAMINE HYDROCHLORIDE 50 MG/ML
50 INJECTION INTRAMUSCULAR; INTRAVENOUS ONCE AS NEEDED
Status: DISCONTINUED | OUTPATIENT
Start: 2021-08-05 | End: 2021-08-05

## 2021-08-05 RX ORDER — SODIUM CHLORIDE 9 MG/ML
INJECTION, SOLUTION INTRAVENOUS CONTINUOUS
Status: DISCONTINUED | OUTPATIENT
Start: 2021-08-05 | End: 2021-08-05

## 2021-08-05 RX ORDER — MIDAZOLAM HYDROCHLORIDE 1 MG/ML
INJECTION INTRAMUSCULAR; INTRAVENOUS
Status: COMPLETED
Start: 2021-08-05 | End: 2021-08-05

## 2021-08-05 RX ORDER — HEPARIN SODIUM 5000 [USP'U]/ML
INJECTION, SOLUTION INTRAVENOUS; SUBCUTANEOUS
Status: COMPLETED
Start: 2021-08-05 | End: 2021-08-05

## 2021-08-05 RX ORDER — LIDOCAINE HYDROCHLORIDE 10 MG/ML
INJECTION, SOLUTION INFILTRATION; PERINEURAL
Status: COMPLETED
Start: 2021-08-05 | End: 2021-08-05

## 2021-08-05 RX ORDER — ONDANSETRON 2 MG/ML
4 INJECTION INTRAMUSCULAR; INTRAVENOUS ONCE AS NEEDED
Status: DISCONTINUED | OUTPATIENT
Start: 2021-08-05 | End: 2021-08-05

## 2021-08-05 RX ADMIN — SODIUM CHLORIDE: 9 INJECTION, SOLUTION INTRAVENOUS at 07:45:00

## 2021-08-05 NOTE — PROCEDURES
[address] [place] [address] Patient Status:  Outpatient in a Bed    6/3/1950 MRN [id number]   Location [address] [facility] Attending Catherine Toure MD   Hosp Day # 0 PCP Zoe Larsen Given, DO         Brief Procedure Report

## 2021-08-05 NOTE — H&P
[facility] Patient Status:  Outpatient in a Bed    6/3/1950 MRN [id number]   [facility] Attending Marli Styles MD   Hosp Day # 0 PCP Zoe Miles CREATSERUM, GFRAA, GFRNAA, CA, NA, K, CL, CO2 in the last 168 hours. Impression: Uterine cancer with PE. Plan for surgery on 8-10-21        Recommendations: IVC filter placement. Diane Cantu MD  Surgery planned is for removal of pelvic mass.

## 2021-08-05 NOTE — PLAN OF CARE
Pt post IVC filter placement via RIJ. Tegaderm to right neck c/d/i. VSS. Pt A&O x4, tolerating PO. Discharge instructions explained to pt, verbalized understanding. Pt taken to Vj entrance via wheelchair w/ all belongings on discharge.

## 2021-08-10 ENCOUNTER — ANESTHESIA (OUTPATIENT)
Dept: SURGERY | Facility: HOSPITAL | Age: 71
DRG: 749 | End: 2021-08-10
Payer: MEDICARE

## 2021-08-10 ENCOUNTER — HOSPITAL ENCOUNTER (INPATIENT)
Facility: HOSPITAL | Age: 71
LOS: 2 days | Discharge: HOME | DRG: 749 | End: 2021-08-12
Attending: SURGERY | Admitting: SURGERY
Payer: MEDICARE

## 2021-08-10 DIAGNOSIS — C79.9 METASTASIS FROM MALIGNANT NEOPLASM OF UTERUS (HCC): Primary | ICD-10-CM

## 2021-08-10 DIAGNOSIS — C55 METASTASIS FROM MALIGNANT NEOPLASM OF UTERUS (HCC): Primary | ICD-10-CM

## 2021-08-10 DIAGNOSIS — C79.89 SECONDARY MALIGNANT NEOPLASM OF SOFT TISSUES OF ABDOMEN (HCC): ICD-10-CM

## 2021-08-10 PROCEDURE — 0YUC0JZ SUPPLEMENT RIGHT UPPER LEG WITH SYNTHETIC SUBSTITUTE, OPEN APPROACH: ICD-10-PCS | Performed by: SURGERY

## 2021-08-10 PROCEDURE — 0JB80ZZ EXCISION OF ABDOMEN SUBCUTANEOUS TISSUE AND FASCIA, OPEN APPROACH: ICD-10-PCS | Performed by: SURGERY

## 2021-08-10 PROCEDURE — 3E0T3BZ INTRODUCTION OF ANESTHETIC AGENT INTO PERIPHERAL NERVES AND PLEXI, PERCUTANEOUS APPROACH: ICD-10-PCS | Performed by: ANESTHESIOLOGY

## 2021-08-10 PROCEDURE — 76942 ECHO GUIDE FOR BIOPSY: CPT | Performed by: ANESTHESIOLOGY

## 2021-08-10 PROCEDURE — P9047 ALBUMIN (HUMAN), 25%, 50ML: HCPCS | Performed by: ANESTHESIOLOGY

## 2021-08-10 PROCEDURE — 99222 1ST HOSP IP/OBS MODERATE 55: CPT | Performed by: HOSPITALIST

## 2021-08-10 PROCEDURE — 0JX80ZC TRANSFER ABDOMEN SUBCUTANEOUS TISSUE AND FASCIA WITH SKIN, SUBCUTANEOUS TISSUE AND FASCIA, OPEN APPROACH: ICD-10-PCS | Performed by: SURGERY

## 2021-08-10 DEVICE — VENTRALIGHT ST MESH
Type: IMPLANTABLE DEVICE | Site: ABDOMEN | Status: FUNCTIONAL
Brand: VENTRALIGHT ST

## 2021-08-10 RX ORDER — BUPIVACAINE HYDROCHLORIDE AND EPINEPHRINE 2.5; 5 MG/ML; UG/ML
INJECTION, SOLUTION EPIDURAL; INFILTRATION; INTRACAUDAL; PERINEURAL AS NEEDED
Status: DISCONTINUED | OUTPATIENT
Start: 2021-08-10 | End: 2021-08-10 | Stop reason: SURG

## 2021-08-10 RX ORDER — MEPERIDINE HYDROCHLORIDE 25 MG/ML
12.5 INJECTION INTRAMUSCULAR; INTRAVENOUS; SUBCUTANEOUS AS NEEDED
Status: DISCONTINUED | OUTPATIENT
Start: 2021-08-10 | End: 2021-08-10 | Stop reason: HOSPADM

## 2021-08-10 RX ORDER — NEOSTIGMINE METHYLSULFATE 1 MG/ML
INJECTION INTRAVENOUS AS NEEDED
Status: DISCONTINUED | OUTPATIENT
Start: 2021-08-10 | End: 2021-08-10 | Stop reason: SURG

## 2021-08-10 RX ORDER — HEPARIN SODIUM 5000 [USP'U]/ML
5000 INJECTION, SOLUTION INTRAVENOUS; SUBCUTANEOUS ONCE
Status: COMPLETED | OUTPATIENT
Start: 2021-08-10 | End: 2021-08-10

## 2021-08-10 RX ORDER — SODIUM CHLORIDE, SODIUM LACTATE, POTASSIUM CHLORIDE, CALCIUM CHLORIDE 600; 310; 30; 20 MG/100ML; MG/100ML; MG/100ML; MG/100ML
INJECTION, SOLUTION INTRAVENOUS CONTINUOUS
Status: DISCONTINUED | OUTPATIENT
Start: 2021-08-10 | End: 2021-08-10 | Stop reason: HOSPADM

## 2021-08-10 RX ORDER — CLONIDINE 100 UG/ML
INJECTION, SOLUTION EPIDURAL AS NEEDED
Status: DISCONTINUED | OUTPATIENT
Start: 2021-08-10 | End: 2021-08-10 | Stop reason: SURG

## 2021-08-10 RX ORDER — LABETALOL HYDROCHLORIDE 5 MG/ML
5 INJECTION, SOLUTION INTRAVENOUS EVERY 5 MIN PRN
Status: DISCONTINUED | OUTPATIENT
Start: 2021-08-10 | End: 2021-08-10 | Stop reason: HOSPADM

## 2021-08-10 RX ORDER — DIPHENHYDRAMINE HYDROCHLORIDE 50 MG/ML
12.5 INJECTION INTRAMUSCULAR; INTRAVENOUS AS NEEDED
Status: DISCONTINUED | OUTPATIENT
Start: 2021-08-10 | End: 2021-08-10 | Stop reason: HOSPADM

## 2021-08-10 RX ORDER — OXYCODONE HYDROCHLORIDE 5 MG/1
5 TABLET ORAL EVERY 4 HOURS PRN
Status: DISCONTINUED | OUTPATIENT
Start: 2021-08-10 | End: 2021-08-11

## 2021-08-10 RX ORDER — MIDAZOLAM HYDROCHLORIDE 1 MG/ML
0.5 INJECTION INTRAMUSCULAR; INTRAVENOUS EVERY 5 MIN PRN
Status: DISCONTINUED | OUTPATIENT
Start: 2021-08-10 | End: 2021-08-10 | Stop reason: HOSPADM

## 2021-08-10 RX ORDER — LIDOCAINE HYDROCHLORIDE 10 MG/ML
INJECTION, SOLUTION EPIDURAL; INFILTRATION; INTRACAUDAL; PERINEURAL AS NEEDED
Status: DISCONTINUED | OUTPATIENT
Start: 2021-08-10 | End: 2021-08-10 | Stop reason: SURG

## 2021-08-10 RX ORDER — HYDROMORPHONE HYDROCHLORIDE 1 MG/ML
0.4 INJECTION, SOLUTION INTRAMUSCULAR; INTRAVENOUS; SUBCUTANEOUS EVERY 5 MIN PRN
Status: DISCONTINUED | OUTPATIENT
Start: 2021-08-10 | End: 2021-08-10 | Stop reason: HOSPADM

## 2021-08-10 RX ORDER — DEXAMETHASONE SODIUM PHOSPHATE 4 MG/ML
VIAL (ML) INJECTION AS NEEDED
Status: DISCONTINUED | OUTPATIENT
Start: 2021-08-10 | End: 2021-08-10 | Stop reason: SURG

## 2021-08-10 RX ORDER — NALOXONE HYDROCHLORIDE 0.4 MG/ML
80 INJECTION, SOLUTION INTRAMUSCULAR; INTRAVENOUS; SUBCUTANEOUS AS NEEDED
Status: DISCONTINUED | OUTPATIENT
Start: 2021-08-10 | End: 2021-08-10 | Stop reason: HOSPADM

## 2021-08-10 RX ORDER — ACETAMINOPHEN 500 MG
1000 TABLET ORAL ONCE
Status: DISCONTINUED | OUTPATIENT
Start: 2021-08-10 | End: 2021-08-10

## 2021-08-10 RX ORDER — ONDANSETRON 2 MG/ML
INJECTION INTRAMUSCULAR; INTRAVENOUS AS NEEDED
Status: DISCONTINUED | OUTPATIENT
Start: 2021-08-10 | End: 2021-08-10 | Stop reason: SURG

## 2021-08-10 RX ORDER — SODIUM CHLORIDE 9 MG/ML
INJECTION, SOLUTION INTRAVENOUS CONTINUOUS PRN
Status: DISCONTINUED | OUTPATIENT
Start: 2021-08-10 | End: 2021-08-10 | Stop reason: SURG

## 2021-08-10 RX ORDER — GLYCOPYRROLATE 0.2 MG/ML
INJECTION, SOLUTION INTRAMUSCULAR; INTRAVENOUS AS NEEDED
Status: DISCONTINUED | OUTPATIENT
Start: 2021-08-10 | End: 2021-08-10 | Stop reason: SURG

## 2021-08-10 RX ORDER — INDOCYANINE GREEN AND WATER 25 MG
KIT INJECTION AS NEEDED
Status: DISCONTINUED | OUTPATIENT
Start: 2021-08-10 | End: 2021-08-10 | Stop reason: SURG

## 2021-08-10 RX ORDER — ENOXAPARIN SODIUM 100 MG/ML
40 INJECTION SUBCUTANEOUS DAILY
Status: DISCONTINUED | OUTPATIENT
Start: 2021-08-11 | End: 2021-08-12

## 2021-08-10 RX ORDER — SODIUM CHLORIDE, SODIUM LACTATE, POTASSIUM CHLORIDE, CALCIUM CHLORIDE 600; 310; 30; 20 MG/100ML; MG/100ML; MG/100ML; MG/100ML
INJECTION, SOLUTION INTRAVENOUS CONTINUOUS
Status: DISCONTINUED | OUTPATIENT
Start: 2021-08-10 | End: 2021-08-11

## 2021-08-10 RX ORDER — ACETAMINOPHEN 500 MG
1000 TABLET ORAL ONCE AS NEEDED
Status: DISCONTINUED | OUTPATIENT
Start: 2021-08-10 | End: 2021-08-10 | Stop reason: HOSPADM

## 2021-08-10 RX ORDER — ACETAMINOPHEN 500 MG
1000 TABLET ORAL EVERY 6 HOURS
Status: DISCONTINUED | OUTPATIENT
Start: 2021-08-10 | End: 2021-08-12

## 2021-08-10 RX ORDER — ONDANSETRON 2 MG/ML
4 INJECTION INTRAMUSCULAR; INTRAVENOUS AS NEEDED
Status: DISCONTINUED | OUTPATIENT
Start: 2021-08-10 | End: 2021-08-10 | Stop reason: HOSPADM

## 2021-08-10 RX ORDER — ONDANSETRON 2 MG/ML
4 INJECTION INTRAMUSCULAR; INTRAVENOUS EVERY 6 HOURS PRN
Status: DISCONTINUED | OUTPATIENT
Start: 2021-08-10 | End: 2021-08-12

## 2021-08-10 RX ORDER — HYDROMORPHONE HYDROCHLORIDE 1 MG/ML
INJECTION, SOLUTION INTRAMUSCULAR; INTRAVENOUS; SUBCUTANEOUS
Status: COMPLETED
Start: 2021-08-10 | End: 2021-08-10

## 2021-08-10 RX ORDER — ROCURONIUM BROMIDE 10 MG/ML
INJECTION, SOLUTION INTRAVENOUS AS NEEDED
Status: DISCONTINUED | OUTPATIENT
Start: 2021-08-10 | End: 2021-08-10 | Stop reason: SURG

## 2021-08-10 RX ORDER — HYDROMORPHONE HYDROCHLORIDE 1 MG/ML
0.2 INJECTION, SOLUTION INTRAMUSCULAR; INTRAVENOUS; SUBCUTANEOUS EVERY 2 HOUR PRN
Status: DISCONTINUED | OUTPATIENT
Start: 2021-08-10 | End: 2021-08-12

## 2021-08-10 RX ORDER — SODIUM CHLORIDE, SODIUM LACTATE, POTASSIUM CHLORIDE, CALCIUM CHLORIDE 600; 310; 30; 20 MG/100ML; MG/100ML; MG/100ML; MG/100ML
INJECTION, SOLUTION INTRAVENOUS CONTINUOUS
Status: DISCONTINUED | OUTPATIENT
Start: 2021-08-10 | End: 2021-08-10

## 2021-08-10 RX ORDER — ALBUMIN (HUMAN) 12.5 G/50ML
SOLUTION INTRAVENOUS CONTINUOUS PRN
Status: DISCONTINUED | OUTPATIENT
Start: 2021-08-10 | End: 2021-08-10 | Stop reason: SURG

## 2021-08-10 RX ORDER — FAMOTIDINE 10 MG/ML
20 INJECTION, SOLUTION INTRAVENOUS 2 TIMES DAILY
Status: DISCONTINUED | OUTPATIENT
Start: 2021-08-10 | End: 2021-08-12

## 2021-08-10 RX ORDER — KETAMINE HYDROCHLORIDE 50 MG/ML
INJECTION, SOLUTION, CONCENTRATE INTRAMUSCULAR; INTRAVENOUS AS NEEDED
Status: DISCONTINUED | OUTPATIENT
Start: 2021-08-10 | End: 2021-08-10 | Stop reason: SURG

## 2021-08-10 RX ORDER — LABETALOL HYDROCHLORIDE 5 MG/ML
INJECTION, SOLUTION INTRAVENOUS AS NEEDED
Status: DISCONTINUED | OUTPATIENT
Start: 2021-08-10 | End: 2021-08-10 | Stop reason: SURG

## 2021-08-10 RX ORDER — FAMOTIDINE 20 MG/1
20 TABLET ORAL 2 TIMES DAILY
Status: DISCONTINUED | OUTPATIENT
Start: 2021-08-10 | End: 2021-08-12

## 2021-08-10 RX ORDER — METOCLOPRAMIDE HYDROCHLORIDE 5 MG/ML
10 INJECTION INTRAMUSCULAR; INTRAVENOUS AS NEEDED
Status: DISCONTINUED | OUTPATIENT
Start: 2021-08-10 | End: 2021-08-10 | Stop reason: HOSPADM

## 2021-08-10 RX ORDER — HYDROCODONE BITARTRATE AND ACETAMINOPHEN 5; 325 MG/1; MG/1
2 TABLET ORAL AS NEEDED
Status: DISCONTINUED | OUTPATIENT
Start: 2021-08-10 | End: 2021-08-10 | Stop reason: HOSPADM

## 2021-08-10 RX ORDER — HYDROCODONE BITARTRATE AND ACETAMINOPHEN 5; 325 MG/1; MG/1
1 TABLET ORAL AS NEEDED
Status: DISCONTINUED | OUTPATIENT
Start: 2021-08-10 | End: 2021-08-10 | Stop reason: HOSPADM

## 2021-08-10 RX ORDER — CEFAZOLIN SODIUM/WATER 2 G/20 ML
2 SYRINGE (ML) INTRAVENOUS ONCE
Status: COMPLETED | OUTPATIENT
Start: 2021-08-10 | End: 2021-08-10

## 2021-08-10 RX ORDER — HYDROMORPHONE HYDROCHLORIDE 1 MG/ML
0.4 INJECTION, SOLUTION INTRAMUSCULAR; INTRAVENOUS; SUBCUTANEOUS EVERY 2 HOUR PRN
Status: DISCONTINUED | OUTPATIENT
Start: 2021-08-10 | End: 2021-08-12

## 2021-08-10 RX ORDER — HYDROMORPHONE HYDROCHLORIDE 1 MG/ML
0.8 INJECTION, SOLUTION INTRAMUSCULAR; INTRAVENOUS; SUBCUTANEOUS EVERY 2 HOUR PRN
Status: DISCONTINUED | OUTPATIENT
Start: 2021-08-10 | End: 2021-08-12

## 2021-08-10 RX ADMIN — LABETALOL HYDROCHLORIDE 5 MG: 5 INJECTION, SOLUTION INTRAVENOUS at 08:35:00

## 2021-08-10 RX ADMIN — NEOSTIGMINE METHYLSULFATE 5 MG: 1 INJECTION INTRAVENOUS at 10:50:00

## 2021-08-10 RX ADMIN — ROCURONIUM BROMIDE 5 MG: 10 INJECTION, SOLUTION INTRAVENOUS at 08:59:00

## 2021-08-10 RX ADMIN — LIDOCAINE HYDROCHLORIDE 50 MG: 10 INJECTION, SOLUTION EPIDURAL; INFILTRATION; INTRACAUDAL; PERINEURAL at 07:27:00

## 2021-08-10 RX ADMIN — ROCURONIUM BROMIDE 50 MG: 10 INJECTION, SOLUTION INTRAVENOUS at 07:27:00

## 2021-08-10 RX ADMIN — GLYCOPYRROLATE 0.8 MG: 0.2 INJECTION, SOLUTION INTRAMUSCULAR; INTRAVENOUS at 10:50:00

## 2021-08-10 RX ADMIN — SODIUM CHLORIDE, SODIUM LACTATE, POTASSIUM CHLORIDE, CALCIUM CHLORIDE: 600; 310; 30; 20 INJECTION, SOLUTION INTRAVENOUS at 09:51:00

## 2021-08-10 RX ADMIN — ROCURONIUM BROMIDE 5 MG: 10 INJECTION, SOLUTION INTRAVENOUS at 10:08:00

## 2021-08-10 RX ADMIN — ALBUMIN (HUMAN): 12.5 SOLUTION INTRAVENOUS at 10:07:00

## 2021-08-10 RX ADMIN — SODIUM CHLORIDE, SODIUM LACTATE, POTASSIUM CHLORIDE, CALCIUM CHLORIDE: 600; 310; 30; 20 INJECTION, SOLUTION INTRAVENOUS at 07:23:00

## 2021-08-10 RX ADMIN — ALBUMIN (HUMAN): 12.5 SOLUTION INTRAVENOUS at 10:10:00

## 2021-08-10 RX ADMIN — ROCURONIUM BROMIDE 10 MG: 10 INJECTION, SOLUTION INTRAVENOUS at 08:23:00

## 2021-08-10 RX ADMIN — KETAMINE HYDROCHLORIDE 25 MG: 50 INJECTION, SOLUTION, CONCENTRATE INTRAMUSCULAR; INTRAVENOUS at 07:27:00

## 2021-08-10 RX ADMIN — CLONIDINE 50 MCG: 100 INJECTION, SOLUTION EPIDURAL at 07:34:00

## 2021-08-10 RX ADMIN — SODIUM CHLORIDE: 9 INJECTION, SOLUTION INTRAVENOUS at 07:36:00

## 2021-08-10 RX ADMIN — KETAMINE HYDROCHLORIDE 25 MG: 50 INJECTION, SOLUTION, CONCENTRATE INTRAMUSCULAR; INTRAVENOUS at 08:32:00

## 2021-08-10 RX ADMIN — INDOCYANINE GREEN AND WATER 5 MG: 25 MG KIT INJECTION at 09:22:00

## 2021-08-10 RX ADMIN — BUPIVACAINE HYDROCHLORIDE AND EPINEPHRINE 30 ML: 2.5; 5 INJECTION, SOLUTION EPIDURAL; INFILTRATION; INTRACAUDAL; PERINEURAL at 07:34:00

## 2021-08-10 RX ADMIN — DEXAMETHASONE SODIUM PHOSPHATE 4 MG: 4 MG/ML VIAL (ML) INJECTION at 07:27:00

## 2021-08-10 RX ADMIN — ROCURONIUM BROMIDE 10 MG: 10 INJECTION, SOLUTION INTRAVENOUS at 09:32:00

## 2021-08-10 RX ADMIN — CEFAZOLIN SODIUM/WATER 2 G: 2 G/20 ML SYRINGE (ML) INTRAVENOUS at 08:02:00

## 2021-08-10 RX ADMIN — LABETALOL HYDROCHLORIDE 5 MG: 5 INJECTION, SOLUTION INTRAVENOUS at 10:56:00

## 2021-08-10 RX ADMIN — ONDANSETRON 4 MG: 2 INJECTION INTRAMUSCULAR; INTRAVENOUS at 10:34:00

## 2021-08-10 NOTE — ANESTHESIA PREPROCEDURE EVALUATION
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 2.  Right ventricle was at upper limits of normal size but had intact right ventricular systolic function.       3.  There is trivial tricuspid regurgitation with PA systolic pressure  07/14/2021    CO2 28.0 07/14/2021    BUN 12 07/14/2021    CREATSERUM 0.72 07/14/2021     (H) 07/14/2021    CA 8.6 07/14/2021     Lab Results   Component Value Date    INR 1.1 08/05/2021         Airway      Mallampati: II  Mouth opening: >3

## 2021-08-10 NOTE — PROGRESS NOTES
Anesthesiology         I spoke with the patient in PACU regarding the damage to the maxillary tooth. Pt was oriented and answering questions appropriately. She stated \"I knew that tooth was bad. \"  My contact information had already been given to the pa

## 2021-08-10 NOTE — BRIEF OP NOTE
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

## 2021-08-10 NOTE — ANESTHESIA PROCEDURE NOTES
[facility]  Performed by: Shen Holguin MD  Authorized by: Shen Holguin MD       General Information and Staff    Start Time:  8/10/2021 7:29 AM  End Time:  8/10/2021 7:34 AM  Anesthesiologist:  Shen Holguin MD  Performed by:   Anesthesiologist  Sourav

## 2021-08-10 NOTE — ANESTHESIA PROCEDURE NOTES
Airway  Date/Time: 8/10/2021 7:27 AM  Urgency: elective      General Information and Staff    Patient location during procedure: OR  Anesthesiologist: Daniel Jarvis MD  Performed: anesthesiologist     Indications and Patient Condition  Indications for airw

## 2021-08-10 NOTE — ANESTHESIA PROCEDURE NOTES
Peripheral IV  Inserted by: Darien Garcia MD    Placement  Needle size: 16 G  Laterality: left  Location: hand  Local anesthetic: none  Site prep: alcohol  Attempts: 1

## 2021-08-10 NOTE — INTERVAL H&P NOTE
There has been no significant change since the patient was seen as documented in EPIC. Surgery revisted. To proceed as planned.       JOSSY LozanoC

## 2021-08-10 NOTE — ANESTHESIA POSTPROCEDURE EVALUATION
[address] [place] [address] Patient Status:  Inpatient   Age/Gender 70year old female MRN [id number]   [facility] SURGERY Attending Angelica Leon MD   Hosp Day # 0 PCP Zoe Taylor DO       Anesthesia Post-op Note    Radical r criteria met.

## 2021-08-11 LAB
ALBUMIN SERPL-MCNC: 3.1 G/DL (ref 3.4–5)
ALBUMIN/GLOB SERPL: 1 {RATIO} (ref 1–2)
ALP LIVER SERPL-CCNC: 46 U/L
ALT SERPL-CCNC: 13 U/L
ANION GAP SERPL CALC-SCNC: 1 MMOL/L (ref 0–18)
AST SERPL-CCNC: 6 U/L (ref 15–37)
BILIRUB SERPL-MCNC: 0.3 MG/DL (ref 0.1–2)
BUN BLD-MCNC: 11 MG/DL (ref 7–18)
CALCIUM BLD-MCNC: 8.7 MG/DL (ref 8.5–10.1)
CHLORIDE SERPL-SCNC: 110 MMOL/L (ref 98–112)
CO2 SERPL-SCNC: 29 MMOL/L (ref 21–32)
CREAT BLD-MCNC: 0.81 MG/DL
DEPRECATED HBV CORE AB SER IA-ACNC: 132.6 NG/ML
ERYTHROCYTE [DISTWIDTH] IN BLOOD BY AUTOMATED COUNT: 13.2 %
GLOBULIN PLAS-MCNC: 3.1 G/DL (ref 2.8–4.4)
GLUCOSE BLD-MCNC: 114 MG/DL (ref 70–99)
HAV IGM SER QL: 2 MG/DL (ref 1.6–2.6)
HCT VFR BLD AUTO: 27.9 %
HGB BLD-MCNC: 8.7 G/DL
HGB BLD-MCNC: 9.6 G/DL
IRON SATURATION: 13 %
IRON SERPL-MCNC: 31 UG/DL
M PROTEIN MFR SERPL ELPH: 6.2 G/DL (ref 6.4–8.2)
MCH RBC QN AUTO: 26.8 PG (ref 26–34)
MCHC RBC AUTO-ENTMCNC: 31.2 G/DL (ref 31–37)
MCV RBC AUTO: 85.8 FL
OSMOLALITY SERPL CALC.SUM OF ELEC: 290 MOSM/KG (ref 275–295)
PHOSPHATE SERPL-MCNC: 3.6 MG/DL (ref 2.5–4.9)
PLATELET # BLD AUTO: 270 10(3)UL (ref 150–450)
POTASSIUM SERPL-SCNC: 4.4 MMOL/L (ref 3.5–5.1)
RBC # BLD AUTO: 3.25 X10(6)UL
SODIUM SERPL-SCNC: 140 MMOL/L (ref 136–145)
TOTAL IRON BINDING CAPACITY: 232 UG/DL (ref 240–450)
TRANSFERRIN SERPL-MCNC: 156 MG/DL (ref 200–360)
WBC # BLD AUTO: 12.8 X10(3) UL (ref 4–11)

## 2021-08-11 PROCEDURE — 99233 SBSQ HOSP IP/OBS HIGH 50: CPT | Performed by: INTERNAL MEDICINE

## 2021-08-11 RX ORDER — MELATONIN
325
Qty: 30 TABLET | Refills: 1 | Status: SHIPPED | OUTPATIENT
Start: 2021-08-11 | End: 2021-10-20 | Stop reason: ALTCHOICE

## 2021-08-11 RX ORDER — TRAMADOL HYDROCHLORIDE 50 MG/1
50 TABLET ORAL EVERY 6 HOURS PRN
Status: DISCONTINUED | OUTPATIENT
Start: 2021-08-11 | End: 2021-08-12

## 2021-08-11 NOTE — PROGRESS NOTES
[facility]  Progress Note     Luis F  Patient Status:  Inpatient    6/3/1950 MRN [id number]   [facility] 7NE-A Attending Angelica Leon MD   Hosp Day # 1 PCP Alvin Ferguson DO     Chief Complaint: Post op pain    S: Pa Subcutaneous Daily   • famoTIDine  20 mg Oral BID    Or   • famoTIDine  20 mg Intravenous BID   • acetaminophen  1,000 mg Oral Q6H     ASSESSMENT / PLAN:     1.  Metastatic deposit (endometrial cancer?) s/p radical resection of right lower abdominal wall an

## 2021-08-11 NOTE — PROGRESS NOTES
POD #1 s/p radical resection of right lower quadrant abdominal wall and proximal right thigh metastatic carcinoma.      Afebrile and VSS  Drop in hgb noted  Feels well  Tolerating regular diet  Adequate urine output    /49 (BP Location: Right arm)   P

## 2021-08-11 NOTE — PHYSICAL THERAPY NOTE
PHYSICAL THERAPY QUICK EVALUATION - INPATIENT    Room Number: 9142/0200-M  Evaluation Date: 8/11/2021  Presenting Problem: s/p Radical resection of right lower abdominal wall and proximal right thigh metastatic carcinoma with sartorius fascia flap transp None                PAIN ASSESSMENT  Ratin         RANGE OF MOTION AND STRENGTH ASSESSMENT  Upper extremity ROM and strength are within functional limits    Lower extremity ROM is within functional limits    Lower extremity strength is within functiona flat, via log roll, independently. BLEs assessed with pt sitting EOB. Pt performed sit to stand transfers throughout session with supervision. Gait trained with no AD independently.  Pt ascended and descended 4 steps with use of 1 rail when ascending and B

## 2021-08-11 NOTE — PLAN OF CARE
NURSING ADMISSION NOTE      Patient admitted via Cart  Oriented to room. Safety precautions initiated. Bed in low position. Call light in reach. Received pt from PACU this afternoon. Oriented pt to room and unit.  Completed admission navigator wit

## 2021-08-11 NOTE — OPERATIVE REPORT
[facility]    PATIENT'S NAME: Fabiana Brittonирина   ATTENDING PHYSICIAN: Deidre Winston. Shobha Duque MD   OPERATING PHYSICIAN: Deidre Winston.  Shobha Duque MD   PATIENT ACCOUNT#:   [id number]    LOCATION:  [address] [place] [address]  MEDICAL RECORD #:   [id number]       DATE OF BIRTH:  1 the level of the external oblique aponeurosis and rectus sheath. As this slightly involved tumor, a portion of these fascias were resected. Lateral dissection was carried out toward the anterior superior iliac spine.   More inferiorly, the upper thigh was was sent in stable condition to recovery room. Counts were correct. I was present throughout the procedure. Dictated By Seth Judge MD  d: 08/10/2021 11:40:37  t: 08/10/2021 22:22:42  [facility] [id number]  [facility]/

## 2021-08-11 NOTE — PLAN OF CARE
Assumed care of patient at 0700  A/Ox4, RA, NSR on tele  Cox removed per order, urine output WNL  2 GODWIN drains to R side, bulb suction, bloody output.  GODWIN drain education given  IV Iron given  Patient to stop taking Lovenox at home, will start xarelto at d

## 2021-08-11 NOTE — PLAN OF CARE
Received pt at 1930  Pt AOx4, NSR, RA, CHATA Cox. Adequate UO  GODWIN x2 moderate bloody output  D/c Planning: PT/OT to see  Call light within reach.  All needs currently met

## 2021-08-12 VITALS
BODY MASS INDEX: 32.14 KG/M2 | SYSTOLIC BLOOD PRESSURE: 117 MMHG | DIASTOLIC BLOOD PRESSURE: 56 MMHG | RESPIRATION RATE: 19 BRPM | HEIGHT: 65 IN | TEMPERATURE: 98 F | OXYGEN SATURATION: 98 % | WEIGHT: 192.88 LBS | HEART RATE: 100 BPM

## 2021-08-12 LAB
BASOPHILS # BLD AUTO: 0.08 X10(3) UL (ref 0–0.2)
BASOPHILS NFR BLD AUTO: 0.8 %
EOSINOPHIL # BLD AUTO: 0.21 X10(3) UL (ref 0–0.7)
EOSINOPHIL NFR BLD AUTO: 2.2 %
ERYTHROCYTE [DISTWIDTH] IN BLOOD BY AUTOMATED COUNT: 13.4 %
HCT VFR BLD AUTO: 29.3 %
HGB BLD-MCNC: 9.3 G/DL
IMM GRANULOCYTES # BLD AUTO: 0.06 X10(3) UL (ref 0–1)
IMM GRANULOCYTES NFR BLD: 0.6 %
LYMPHOCYTES # BLD AUTO: 2.19 X10(3) UL (ref 1–4)
LYMPHOCYTES NFR BLD AUTO: 22.6 %
MCH RBC QN AUTO: 27.3 PG (ref 26–34)
MCHC RBC AUTO-ENTMCNC: 31.7 G/DL (ref 31–37)
MCV RBC AUTO: 85.9 FL
MONOCYTES # BLD AUTO: 0.64 X10(3) UL (ref 0.1–1)
MONOCYTES NFR BLD AUTO: 6.6 %
NEUTROPHILS # BLD AUTO: 6.5 X10 (3) UL (ref 1.5–7.7)
NEUTROPHILS # BLD AUTO: 6.5 X10(3) UL (ref 1.5–7.7)
NEUTROPHILS NFR BLD AUTO: 67.2 %
PLATELET # BLD AUTO: 238 10(3)UL (ref 150–450)
RBC # BLD AUTO: 3.41 X10(6)UL
WBC # BLD AUTO: 9.7 X10(3) UL (ref 4–11)

## 2021-08-12 PROCEDURE — 99239 HOSP IP/OBS DSCHRG MGMT >30: CPT | Performed by: INTERNAL MEDICINE

## 2021-08-12 NOTE — PROGRESS NOTES
POD #2 s/p radical resection of right lower quadrant abdominal wall and proximal right thigh metastatic carcinoma.      Afebrile and VSS  Hgb stable, drains more serosanguinous today  Feels well  Tolerating regular diet  Ready to go home with sister     BP

## 2021-08-12 NOTE — DISCHARGE SUMMARY
[facility]  Discharge Summary    [facility] Room Patient Status:  Inpatient    6/3/1950 MRN [id number]   [facility] 7NE-A Attending No att. providers found   Hosp Day # 2 PCP Zoe Barbour DO     Date of Admission: 8/10/2021 8/10/2021. Hospital course was unremarkable. The patient was discharged home on POD #2. Procedures:   PROCEDURE:  8/10/2021  1. Radical resection of right lower quadrant abdominal wall and proximal right thigh metastatic carcinoma.   2.       Sobia Crea

## 2021-08-12 NOTE — PLAN OF CARE
Received pt at 1930  Pt AOx4, NSR, RA, VSS  Voiding freely. UO adequate  GODWIN x2 ss/bloody output  No complaints of pain  D/c Planning: Home 8/12  Call light within reach.  All needs currently met

## 2021-08-12 NOTE — PROGRESS NOTES
[facility]  Progress Note     [facility] Console Patient Status:  Inpatient    6/3/1950 MRN [id number]   [facility] 7NE-A Attending Yousuf Andrade MD   Hosp Day # 2 PCP [address] [place] [address]     Chief Complaint: Post op pain    S: Pa Subcutaneous Daily   • famoTIDine  20 mg Oral BID    Or   • famoTIDine  20 mg Intravenous BID   • acetaminophen  1,000 mg Oral Q6H     ASSESSMENT / PLAN:     1.  Metastatic deposit (endometrial cancer?) s/p radical resection of right lower abdominal wall an

## 2021-08-12 NOTE — PLAN OF CARE
Assumed pt care at [address] [facility] oriented x4  Tele; NSR/ST with activity  Room air  Denies pain  Abdominal incision well approximated with staples.   Dressing removed by ROE Obrien  2 GODWIN drains with SS output; drain education complete with return demonstra

## 2021-08-12 NOTE — PLAN OF CARE
NURSING DISCHARGE NOTE    Discharged Home via Wheelchair. Accompanied by Support staff  Belongings Taken by patient/family. Discharge AVS complete and reviewed with patient. Patient medically cleared to discharge home per all consults.   Discharge

## 2021-08-13 ENCOUNTER — TELEPHONE (OUTPATIENT)
Dept: SURGERY | Facility: CLINIC | Age: 71
End: 2021-08-13

## 2021-08-13 NOTE — TELEPHONE ENCOUNTER
Called to check on patient after hospital discharge. Pt stated she is having no pain at this time. She is having adequate intake and output. She has 2 drains, one has minimal output that is not measurable.   Pt stated she had 2 normal bowel movements tod

## 2021-08-19 ENCOUNTER — OFFICE VISIT (OUTPATIENT)
Dept: SURGERY | Facility: CLINIC | Age: 71
End: 2021-08-19
Payer: MEDICARE

## 2021-08-19 VITALS
SYSTOLIC BLOOD PRESSURE: 164 MMHG | RESPIRATION RATE: 16 BRPM | HEART RATE: 127 BPM | OXYGEN SATURATION: 97 % | TEMPERATURE: 98 F | DIASTOLIC BLOOD PRESSURE: 104 MMHG | WEIGHT: 186.38 LBS | BODY MASS INDEX: 31 KG/M2

## 2021-08-19 DIAGNOSIS — C79.9 METASTASIS FROM MALIGNANT NEOPLASM OF UTERUS (HCC): Primary | ICD-10-CM

## 2021-08-19 DIAGNOSIS — C55 METASTASIS FROM MALIGNANT NEOPLASM OF UTERUS (HCC): Primary | ICD-10-CM

## 2021-08-19 PROCEDURE — 99024 POSTOP FOLLOW-UP VISIT: CPT | Performed by: PHYSICIAN ASSISTANT

## 2021-08-19 NOTE — PROGRESS NOTES
[address] [facility] Surgical Oncology        Patient Name:  Severiano Jericho   YOB: 1950   Gender:  Female   Appt Date:  8/19/2021   Provider:  Edinson Dowling MD     PATIENT PROVIDERS  Primary Care Provider:Zoe Alvarez DO   Address: [address]   Temp 98.3 °F (36.8 °C) (Tympanic)   Resp 16   Wt 84.6 kg (186 lb 6.4 oz)   SpO2 97%   BMI 31.02 kg/m²      Medications Reviewed:    Current Outpatient Medications:   •  ferrous sulfate 325 (65 FE) MG Oral Tab EC, Take 1 tablet (325 mg total) by mouth Cardiac: HR approximately 100 at rest.  Abdomen: soft, non-tender, and non-distended. Musculoskeletal: There is a well healing incision of the right lower pelvic region/groin. Staples are c/d/i without warmth, erythema, or drainage.  Drains are serosanguin

## 2021-08-25 ENCOUNTER — OFFICE VISIT (OUTPATIENT)
Dept: SURGERY | Facility: CLINIC | Age: 71
End: 2021-08-25
Payer: MEDICARE

## 2021-08-25 ENCOUNTER — OFFICE VISIT (OUTPATIENT)
Dept: HEMATOLOGY/ONCOLOGY | Facility: HOSPITAL | Age: 71
End: 2021-08-25
Attending: INTERNAL MEDICINE
Payer: MEDICARE

## 2021-08-25 VITALS
TEMPERATURE: 98 F | RESPIRATION RATE: 18 BRPM | HEART RATE: 119 BPM | OXYGEN SATURATION: 98 % | BODY MASS INDEX: 32 KG/M2 | DIASTOLIC BLOOD PRESSURE: 86 MMHG | SYSTOLIC BLOOD PRESSURE: 162 MMHG | WEIGHT: 191 LBS

## 2021-08-25 VITALS
OXYGEN SATURATION: 96 % | WEIGHT: 189.19 LBS | SYSTOLIC BLOOD PRESSURE: 146 MMHG | DIASTOLIC BLOOD PRESSURE: 79 MMHG | TEMPERATURE: 100 F | HEART RATE: 109 BPM | BODY MASS INDEX: 31 KG/M2

## 2021-08-25 DIAGNOSIS — C55 METASTASIS FROM MALIGNANT NEOPLASM OF UTERUS (HCC): Primary | ICD-10-CM

## 2021-08-25 DIAGNOSIS — C55 METASTASIS FROM MALIGNANT NEOPLASM OF UTERUS (HCC): ICD-10-CM

## 2021-08-25 DIAGNOSIS — C79.9 METASTASIS FROM MALIGNANT NEOPLASM OF UTERUS (HCC): Primary | ICD-10-CM

## 2021-08-25 DIAGNOSIS — I26.99 ACUTE PULMONARY EMBOLISM, UNSPECIFIED PULMONARY EMBOLISM TYPE, UNSPECIFIED WHETHER ACUTE COR PULMONALE PRESENT (HCC): Primary | ICD-10-CM

## 2021-08-25 DIAGNOSIS — I26.99 ACUTE PULMONARY EMBOLISM, UNSPECIFIED PULMONARY EMBOLISM TYPE, UNSPECIFIED WHETHER ACUTE COR PULMONALE PRESENT (HCC): ICD-10-CM

## 2021-08-25 DIAGNOSIS — C79.89 SECONDARY MALIGNANT NEOPLASM OF SOFT TISSUES OF ABDOMEN (HCC): ICD-10-CM

## 2021-08-25 DIAGNOSIS — C79.9 METASTASIS FROM MALIGNANT NEOPLASM OF UTERUS (HCC): ICD-10-CM

## 2021-08-25 PROCEDURE — 99215 OFFICE O/P EST HI 40 MIN: CPT | Performed by: INTERNAL MEDICINE

## 2021-08-25 PROCEDURE — 99024 POSTOP FOLLOW-UP VISIT: CPT | Performed by: SURGERY

## 2021-08-25 RX ORDER — LETROZOLE 2.5 MG/1
TABLET, FILM COATED ORAL
Qty: 90 TABLET | Refills: 0 | Status: SHIPPED | OUTPATIENT
Start: 2021-08-25 | End: 2021-11-29

## 2021-08-25 RX ORDER — LETROZOLE 2.5 MG/1
2.5 TABLET, FILM COATED ORAL DAILY
Qty: 30 TABLET | Refills: 0 | Status: SHIPPED | OUTPATIENT
Start: 2021-09-10 | End: 2021-08-25

## 2021-08-25 NOTE — PROGRESS NOTES
THE [facility] Hematology and Oncology Clinic Note    Visit Diagnosis:  Acute pulmonary embolism, unspecified pulmonary embolism type, unspecified whether acute cor pulmonale present ([address]  (primary encounter diagnosis)  Metastasis from malignant neoplasm of uteru she was started on Lovenox. · 8/10/21: RLQ Radical Abdominal soft tissue resection with Dr. Keysha Johansen on 8/10/21. Endometrial adenocarcinoma, endometrioid type, 13.5 cm, Grade 1, negative margins, 0/2 LN.      Interval History:   08/25/21: Follow up for a RUL Diagnosis Date   • Cancer [facility])     abdominal tumor/ uterine cancer 1999, abdominal wall 2021   • Osteoarthritis     bilateral knees   • PONV (postoperative nausea and vomiting)    • Pulmonary embolism ([address]     7/2021-getting filter put in 8/5/21   • Vi  08/11/2021    BUN 11 08/11/2021    PHOS 3.6 08/11/2021    ALB 3.1 (L) 08/11/2021       No results found for: LDH    Radiology: I personally reviewed the imaging  6/30/21: CT AP  1.  Greater than 12 cm mass is noted with epicenter right inguinal re  Correlation with the clinical findings is recommended.        Assessment and Plan:  71F with a PMH of hearing impairment presented to the ER on 7/13/21 with a PE.     RUL PE: Dx 7/13/21  · Likely provoked by BMI and active malignancy   · Dopplers negative

## 2021-08-25 NOTE — PROGRESS NOTES
Surgical Oncology Progress Note    Patient returns today for postoperative visit. She recently underwent resection of metastatic endometrial carcinoma to the abdominal wall. She has been doing fairly well. Constitutional:  NAD.    Eyes: non-icter

## 2021-08-27 ENCOUNTER — TELEPHONE (OUTPATIENT)
Dept: SURGERY | Facility: CLINIC | Age: 71
End: 2021-08-27

## 2021-08-27 NOTE — TELEPHONE ENCOUNTER
Called to check on patient. She has been doing well since discharge. No new swelling, warmth, erythema, or drainage. She will continue to monitor site. She knows to call should she experience this.

## 2021-09-07 ENCOUNTER — TELEPHONE (OUTPATIENT)
Dept: HEMATOLOGY/ONCOLOGY | Facility: HOSPITAL | Age: 71
End: 2021-09-07

## 2021-09-07 NOTE — TELEPHONE ENCOUNTER
Called patient per request. She wanted to inform Dr. Ailyn Curtis that she had episodes of uncontrolled diarrhea this past week. She ended up managing symptoms with imodium and fluids.  Instructed patient to continue BRAT diet with fluids and electrolyte replaceme

## 2021-09-08 ENCOUNTER — TELEPHONE (OUTPATIENT)
Dept: SURGERY | Facility: CLINIC | Age: 71
End: 2021-09-08

## 2021-09-08 ENCOUNTER — HOSPITAL ENCOUNTER (OUTPATIENT)
Dept: CT IMAGING | Facility: HOSPITAL | Age: 71
Discharge: HOME | End: 2021-09-08
Attending: PHYSICIAN ASSISTANT
Payer: MEDICARE

## 2021-09-08 ENCOUNTER — OFFICE VISIT (OUTPATIENT)
Dept: SURGERY | Facility: CLINIC | Age: 71
End: 2021-09-08
Payer: MEDICARE

## 2021-09-08 VITALS
BODY MASS INDEX: 31 KG/M2 | OXYGEN SATURATION: 97 % | DIASTOLIC BLOOD PRESSURE: 81 MMHG | SYSTOLIC BLOOD PRESSURE: 134 MMHG | WEIGHT: 184 LBS | HEART RATE: 94 BPM

## 2021-09-08 DIAGNOSIS — L02.91 SOFT TISSUE ABSCESS: ICD-10-CM

## 2021-09-08 DIAGNOSIS — L02.91 SOFT TISSUE ABSCESS: Primary | ICD-10-CM

## 2021-09-08 DIAGNOSIS — L02.211 ABSCESS OF ABDOMINAL WALL: Primary | ICD-10-CM

## 2021-09-08 PROCEDURE — 73701 CT LOWER EXTREMITY W/DYE: CPT | Performed by: PHYSICIAN ASSISTANT

## 2021-09-08 PROCEDURE — 99024 POSTOP FOLLOW-UP VISIT: CPT | Performed by: PHYSICIAN ASSISTANT

## 2021-09-08 PROCEDURE — 72193 CT PELVIS W/DYE: CPT | Performed by: PHYSICIAN ASSISTANT

## 2021-09-08 RX ORDER — LEVOFLOXACIN 500 MG/1
500 TABLET, FILM COATED ORAL DAILY
Qty: 7 TABLET | Refills: 0 | Status: SHIPPED | OUTPATIENT
Start: 2021-09-08 | End: 2021-09-15

## 2021-09-08 NOTE — PROGRESS NOTES
[facility] Surgical Oncology        Patient Name:  Melvin Del Castillo   YOB: 1950   Gender:  Female   Appt Date:  9/8/2021   Provider:  Swapna Mathews MD     PATIENT PROVIDERS  Primary Care Provider:Zoe Beasley DO   Address: [address] Location: Left arm, Patient Position: Sitting)   Pulse 94   Wt 83.5 kg (184 lb)   SpO2 97%   BMI 30.62 kg/m²      Medications Reviewed:    Current Outpatient Medications:   •  Rivaroxaban (XARELTO) 20 MG Oral Tab, Take 1 tablet (20 mg total) by mouth daily Physical Examination:  Constitutional: : NAD. Eyes: Sclera: non-icteric. Neck: Neck: supple. Abdomen: soft, non-tender, and non-distended. Musculoskeletal: There is a well healed incision of the right pelvis.  There is warmth, erythema, and purulent

## 2021-09-08 NOTE — TELEPHONE ENCOUNTER
CT reviewed and discussed. Plan antibiotics and IR drainage. Discussed with patient over the phone options to include admission. She prefers to deal with with outpatient hoping for tomorrow.   Thus I called and prescription of antibiotics to her pharmacy

## 2021-09-08 NOTE — TELEPHONE ENCOUNTER
Pt called and stated that the site where her drain was coming out of her leg is now leaking. She stated it started at 2030 last night. The fluid is white and beige. She stated it is large amounts that has been continuously draining.   Made patient appoin

## 2021-09-09 ENCOUNTER — OFFICE VISIT (OUTPATIENT)
Dept: SURGERY | Facility: CLINIC | Age: 71
End: 2021-09-09
Payer: MEDICARE

## 2021-09-09 DIAGNOSIS — L02.91 ABSCESS: Primary | ICD-10-CM

## 2021-09-09 DIAGNOSIS — Z98.890 POST-OPERATIVE STATE: ICD-10-CM

## 2021-09-09 DIAGNOSIS — L02.211 CUTANEOUS ABSCESS OF ABDOMINAL WALL: Primary | ICD-10-CM

## 2021-09-09 PROCEDURE — 87186 SC STD MICRODIL/AGAR DIL: CPT | Performed by: PHYSICIAN ASSISTANT

## 2021-09-09 PROCEDURE — 1111F DSCHRG MED/CURRENT MED MERGE: CPT | Performed by: PHYSICIAN ASSISTANT

## 2021-09-09 PROCEDURE — 87077 CULTURE AEROBIC IDENTIFY: CPT | Performed by: PHYSICIAN ASSISTANT

## 2021-09-09 PROCEDURE — 87205 SMEAR GRAM STAIN: CPT | Performed by: PHYSICIAN ASSISTANT

## 2021-09-09 PROCEDURE — 87070 CULTURE OTHR SPECIMN AEROBIC: CPT | Performed by: PHYSICIAN ASSISTANT

## 2021-09-09 PROCEDURE — 99024 POSTOP FOLLOW-UP VISIT: CPT | Performed by: PHYSICIAN ASSISTANT

## 2021-09-09 NOTE — PROGRESS NOTES
[facility] Surgical Oncology        Patient Name:  Greer Medel   YOB: 1950   Gender:  Female   Appt Date:  9/9/2021   Provider:  Trish Patel MD     PATIENT PROVIDERS  Primary Care Provider:Zoe Shields DO   Address: [address] tablet, Rfl: 0  •  Rivaroxaban (XARELTO) 20 MG Oral Tab, Take 1 tablet (20 mg total) by mouth daily with food. , Disp: 30 tablet, Rfl: 0  •  LETROZOLE 2.5 MG Oral Tab, TAKE 1 TABLET(2.5 MG) BY MOUTH DAILY, Disp: 90 tablet, Rfl: 0  •  ferrous sulfate 325 (65 with medication.  If there   have been no medication injections, then these potentially could be areas of inflammation, abscess or other fluid.  No air bubbles are seen within these foci.  Correlate clinically.       Assessment / Plan:  Uterine carcinoma me

## 2021-09-10 ENCOUNTER — HOSPITAL ENCOUNTER (OUTPATIENT)
Dept: CT IMAGING | Facility: HOSPITAL | Age: 71
Discharge: HOME | End: 2021-09-10
Attending: PHYSICIAN ASSISTANT
Payer: MEDICARE

## 2021-09-10 ENCOUNTER — APPOINTMENT (OUTPATIENT)
Dept: HEMATOLOGY/ONCOLOGY | Facility: HOSPITAL | Age: 71
End: 2021-09-10
Attending: INTERNAL MEDICINE
Payer: MEDICARE

## 2021-09-10 VITALS
OXYGEN SATURATION: 99 % | RESPIRATION RATE: 15 BRPM | SYSTOLIC BLOOD PRESSURE: 123 MMHG | DIASTOLIC BLOOD PRESSURE: 69 MMHG | TEMPERATURE: 98 F | HEART RATE: 89 BPM

## 2021-09-10 DIAGNOSIS — L02.211 CUTANEOUS ABSCESS OF ABDOMINAL WALL: ICD-10-CM

## 2021-09-10 PROCEDURE — 87205 SMEAR GRAM STAIN: CPT | Performed by: PHYSICIAN ASSISTANT

## 2021-09-10 PROCEDURE — 87077 CULTURE AEROBIC IDENTIFY: CPT | Performed by: PHYSICIAN ASSISTANT

## 2021-09-10 PROCEDURE — 10030 IMG GID FLU COLL DRG SFT TIS: CPT | Performed by: PHYSICIAN ASSISTANT

## 2021-09-10 PROCEDURE — 87186 SC STD MICRODIL/AGAR DIL: CPT | Performed by: PHYSICIAN ASSISTANT

## 2021-09-10 PROCEDURE — 87075 CULTR BACTERIA EXCEPT BLOOD: CPT | Performed by: PHYSICIAN ASSISTANT

## 2021-09-10 PROCEDURE — 87070 CULTURE OTHR SPECIMN AEROBIC: CPT | Performed by: PHYSICIAN ASSISTANT

## 2021-09-10 NOTE — IMAGING NOTE
Pt here for subcutaneous abscess drainage procedure accompanied by her niece. NPO since last evening. Pre-procedure vitals stable. Afebrile. Pt currently on antibiotics daily. Procedure explained by Dr Joselito Pope and pt verbalized understanding.  Pt positione

## 2021-09-10 NOTE — PROCEDURES
[facility]  Procedure Note    Oneida Dotson Patient Status:  Outpatient    6/3/1950 MRN [id number]   [facility] CT Attending ROE Nino   Hosp Day # 0 PCP Zoe Alvarado [facility], [address] [facility]     Procedure: CT guided drainage abdominal wal

## 2021-09-13 ENCOUNTER — TELEPHONE (OUTPATIENT)
Dept: SURGERY | Facility: CLINIC | Age: 71
End: 2021-09-13

## 2021-09-13 NOTE — TELEPHONE ENCOUNTER
Patient called with update. Drain placed on 9/10. Continues to have murky drainage, high output. Right thigh drainage site has stopped draining. Okay to remove fistula bag keep site clean and dry. Apply clean gauze if needed. Okay to shower.   Denies fever

## 2021-09-20 ENCOUNTER — LAB ENCOUNTER (OUTPATIENT)
Dept: LAB | Age: 71
End: 2021-09-20
Attending: INTERNAL MEDICINE
Payer: MEDICARE

## 2021-09-20 DIAGNOSIS — Z95.828 PRESENCE OF IVC FILTER: ICD-10-CM

## 2021-09-22 ENCOUNTER — APPOINTMENT (OUTPATIENT)
Dept: HEMATOLOGY/ONCOLOGY | Facility: HOSPITAL | Age: 71
End: 2021-09-22
Attending: INTERNAL MEDICINE
Payer: MEDICARE

## 2021-09-22 LAB — SARS-COV-2 RNA RESP QL NAA+PROBE: NOT DETECTED

## 2021-09-23 ENCOUNTER — HOSPITAL ENCOUNTER (OUTPATIENT)
Dept: INTERVENTIONAL RADIOLOGY/VASCULAR | Facility: HOSPITAL | Age: 71
Setting detail: HOSPITAL OUTPATIENT SURGERY
Discharge: HOME | End: 2021-09-23
Attending: INTERNAL MEDICINE | Admitting: INTERNAL MEDICINE
Payer: MEDICARE

## 2021-09-23 VITALS
HEART RATE: 93 BPM | DIASTOLIC BLOOD PRESSURE: 60 MMHG | SYSTOLIC BLOOD PRESSURE: 130 MMHG | OXYGEN SATURATION: 100 % | TEMPERATURE: 98 F | RESPIRATION RATE: 20 BRPM

## 2021-09-23 DIAGNOSIS — Z95.828 PRESENCE OF IVC FILTER: Primary | ICD-10-CM

## 2021-09-23 DIAGNOSIS — I26.99 ACUTE PULMONARY EMBOLISM, UNSPECIFIED PULMONARY EMBOLISM TYPE, UNSPECIFIED WHETHER ACUTE COR PULMONALE PRESENT (HCC): ICD-10-CM

## 2021-09-23 DIAGNOSIS — C55 METASTASIS FROM MALIGNANT NEOPLASM OF UTERUS (HCC): ICD-10-CM

## 2021-09-23 DIAGNOSIS — C79.9 METASTASIS FROM MALIGNANT NEOPLASM OF UTERUS (HCC): ICD-10-CM

## 2021-09-23 LAB
ANION GAP SERPL CALC-SCNC: 4 MMOL/L (ref 0–18)
BUN BLD-MCNC: 10 MG/DL (ref 7–18)
CALCIUM BLD-MCNC: 9.6 MG/DL (ref 8.5–10.1)
CHLORIDE SERPL-SCNC: 109 MMOL/L (ref 98–112)
CO2 SERPL-SCNC: 27 MMOL/L (ref 21–32)
CREAT BLD-MCNC: 0.64 MG/DL
ERYTHROCYTE [DISTWIDTH] IN BLOOD BY AUTOMATED COUNT: 14.5 %
GLUCOSE BLD-MCNC: 107 MG/DL (ref 70–99)
HCT VFR BLD AUTO: 36.7 %
HGB BLD-MCNC: 11 G/DL
INR: 1.1 (ref 0.8–1.3)
MCH RBC QN AUTO: 25.8 PG (ref 26–34)
MCHC RBC AUTO-ENTMCNC: 30 G/DL (ref 31–37)
MCV RBC AUTO: 85.9 FL
OSMOLALITY SERPL CALC.SUM OF ELEC: 290 MOSM/KG (ref 275–295)
PLATELET # BLD AUTO: 233 10(3)UL (ref 150–450)
POTASSIUM SERPL-SCNC: 3.9 MMOL/L (ref 3.5–5.1)
RBC # BLD AUTO: 4.27 X10(6)UL
SODIUM SERPL-SCNC: 140 MMOL/L (ref 136–145)
WBC # BLD AUTO: 6.9 X10(3) UL (ref 4–11)

## 2021-09-23 PROCEDURE — 80048 BASIC METABOLIC PNL TOTAL CA: CPT | Performed by: RADIOLOGY

## 2021-09-23 PROCEDURE — 85610 PROTHROMBIN TIME: CPT

## 2021-09-23 PROCEDURE — 85027 COMPLETE CBC AUTOMATED: CPT | Performed by: RADIOLOGY

## 2021-09-23 PROCEDURE — 99152 MOD SED SAME PHYS/QHP 5/>YRS: CPT

## 2021-09-23 PROCEDURE — 06PY3DZ REMOVAL OF INTRALUMINAL DEVICE FROM LOWER VEIN, PERCUTANEOUS APPROACH: ICD-10-PCS | Performed by: RADIOLOGY

## 2021-09-23 PROCEDURE — 37193 REM ENDOVAS VENA CAVA FILTER: CPT

## 2021-09-23 RX ORDER — LIDOCAINE HYDROCHLORIDE 10 MG/ML
INJECTION, SOLUTION INFILTRATION; PERINEURAL
Status: COMPLETED
Start: 2021-09-23 | End: 2021-09-23

## 2021-09-23 RX ORDER — MIDAZOLAM HYDROCHLORIDE 1 MG/ML
INJECTION INTRAMUSCULAR; INTRAVENOUS
Status: COMPLETED
Start: 2021-09-23 | End: 2021-09-23

## 2021-09-23 RX ORDER — SODIUM CHLORIDE 9 MG/ML
INJECTION, SOLUTION INTRAVENOUS CONTINUOUS
Status: DISCONTINUED | OUTPATIENT
Start: 2021-09-23 | End: 2021-09-27

## 2021-09-23 RX ORDER — HEPARIN SODIUM 5000 [USP'U]/ML
INJECTION, SOLUTION INTRAVENOUS; SUBCUTANEOUS
Status: COMPLETED
Start: 2021-09-23 | End: 2021-09-23

## 2021-09-23 RX ADMIN — SODIUM CHLORIDE: 9 INJECTION, SOLUTION INTRAVENOUS at 09:00:00

## 2021-09-23 NOTE — PROGRESS NOTES
Rc'd pt from IR in stable condition s/p IVC filter removal. Manual dressing to RIJ is soft, clean and dry. No bleeding or hematoma. Pt denies c/o pain or discomfort. Pt tolerating po intake well. Sister at bedside. 12:00: Bedrest completed.  Pt ambulate

## 2021-09-23 NOTE — PROCEDURES
[facility]  Procedure Note    Melo Domínguez Patient Status:  Outpatient in a Bed    6/3/1950 MRN [id number]   Location [address] [facility] Attending Stephanie Olmstead MD   Hosp Day # 0 PCP Zoe Stark DO     Procedure: IV

## 2021-09-23 NOTE — H&P
[facility]   History & Physical    Priyank Room Patient Status:  Outpatient in a Bed    6/3/1950 MRN [id number]   Location [facility] [address] [facility] Attending Melly Mcfarlane MD   Hosp Day # 0 PCP DO Laura Jay WBC 6.9   .0     Recent Labs   Lab 09/23/21  0910   INR 1.1     Recent Labs   Lab 09/23/21  0850   *   BUN 10   CREATSERUM 0.64   GFRAA 104   GFRNAA 90   CA 9.6      K 3.9      CO2 27.0       Assessment/Plan:   Impression: 71 yo

## 2021-09-28 ENCOUNTER — TELEPHONE (OUTPATIENT)
Dept: SURGERY | Facility: CLINIC | Age: 71
End: 2021-09-28

## 2021-09-28 NOTE — TELEPHONE ENCOUNTER
Called to check on patient. She has been doing well. Drain with minimal output. Plan for follow-up next week to re-evaluate drain.

## 2021-10-04 ENCOUNTER — OFFICE VISIT (OUTPATIENT)
Dept: SURGERY | Facility: CLINIC | Age: 71
End: 2021-10-04
Payer: MEDICARE

## 2021-10-04 VITALS
DIASTOLIC BLOOD PRESSURE: 84 MMHG | OXYGEN SATURATION: 99 % | WEIGHT: 181.38 LBS | SYSTOLIC BLOOD PRESSURE: 142 MMHG | RESPIRATION RATE: 16 BRPM | TEMPERATURE: 98 F | BODY MASS INDEX: 30 KG/M2 | HEART RATE: 109 BPM

## 2021-10-04 DIAGNOSIS — C79.89 SECONDARY MALIGNANT NEOPLASM OF SOFT TISSUES OF ABDOMEN (HCC): Primary | ICD-10-CM

## 2021-10-04 PROCEDURE — 99024 POSTOP FOLLOW-UP VISIT: CPT | Performed by: SURGERY

## 2021-10-04 NOTE — PROGRESS NOTES
Surgical Oncology Progress Note    Patient returns today for a follow-up visit. She has been doing fairly well since she was last seen. She reports no output from drain for about 3 days.     Incision is healing well withou

## 2021-10-10 ENCOUNTER — PATIENT MESSAGE (OUTPATIENT)
Dept: HEMATOLOGY/ONCOLOGY | Facility: HOSPITAL | Age: 71
End: 2021-10-10

## 2021-10-12 NOTE — PROGRESS NOTES
THE [facility] Hematology and Oncology Clinic Note    Visit Diagnosis:  Acute pulmonary embolism, unspecified pulmonary embolism type, unspecified whether acute cor pulmonale present ([address]  (primary encounter diagnosis)  Other long term (current) drug therapy   Lo Bilateral dopplers negative. For her PE, she was started on Lovenox. · 8/10/21: RLQ Radical Abdominal soft tissue resection with Dr. Deedee Rodrigues on 8/10/21. Endometrial adenocarcinoma, endometrioid type, 13.5 cm, Grade 1, negative margins, 0/2 LN.    · 9/10/21: abdominal tumor/ uterine cancer 1999, abdominal wall 2021   • Osteoarthritis     bilateral knees   • PONV (postoperative nausea and vomiting)    • Pulmonary embolism ([address]     7/2021-getting filter put in 8/5/21   • Visual impairment     contacts and glass I personally reviewed the imaging  6/30/21: CT AP  1.  Greater than 12 cm mass is noted with epicenter right inguinal region.  This is mostly solid with areas of cystic or necrotic change.  This is most likely a soft tissue neoplasm and biopsy is recommende on 7/13/21 with a PE.     RUL PE: Dx 7/13/21  · Likely provoked by BMI and active malignancy   · Dopplers negative   · Baseline D-dimer 3.87  · Now on Xarelto 20 mg daily   · S/P temporary IVC filter which was removed 9/23/21   · Will complete 3 months of

## 2021-10-13 ENCOUNTER — OFFICE VISIT (OUTPATIENT)
Dept: HEMATOLOGY/ONCOLOGY | Facility: HOSPITAL | Age: 71
End: 2021-10-13
Attending: INTERNAL MEDICINE
Payer: MEDICARE

## 2021-10-13 VITALS
OXYGEN SATURATION: 98 % | HEIGHT: 65 IN | TEMPERATURE: 97 F | RESPIRATION RATE: 16 BRPM | DIASTOLIC BLOOD PRESSURE: 81 MMHG | SYSTOLIC BLOOD PRESSURE: 143 MMHG | WEIGHT: 181 LBS | BODY MASS INDEX: 30.16 KG/M2 | HEART RATE: 88 BPM

## 2021-10-13 DIAGNOSIS — I26.99 ACUTE PULMONARY EMBOLISM, UNSPECIFIED PULMONARY EMBOLISM TYPE, UNSPECIFIED WHETHER ACUTE COR PULMONALE PRESENT (HCC): Primary | ICD-10-CM

## 2021-10-13 DIAGNOSIS — Z79.811 LONG TERM (CURRENT) USE OF AROMATASE INHIBITORS: ICD-10-CM

## 2021-10-13 DIAGNOSIS — Z79.899 OTHER LONG TERM (CURRENT) DRUG THERAPY: ICD-10-CM

## 2021-10-13 PROCEDURE — 99214 OFFICE O/P EST MOD 30 MIN: CPT | Performed by: INTERNAL MEDICINE

## 2021-10-13 NOTE — PROGRESS NOTES
Outpatient Oncology Care Plan   Problem list:   knowledge deficit   Problems related to:   self care   Interventions:   provided general teaching   Expected outcomes:   understands plan of care   Progress towards outcome: making progress     Education Leonel

## 2021-10-20 ENCOUNTER — OFFICE VISIT (OUTPATIENT)
Dept: INTERNAL MEDICINE CLINIC | Facility: CLINIC | Age: 71
End: 2021-10-20
Payer: MEDICARE

## 2021-10-20 VITALS
HEART RATE: 72 BPM | HEIGHT: 65 IN | WEIGHT: 183.63 LBS | SYSTOLIC BLOOD PRESSURE: 124 MMHG | DIASTOLIC BLOOD PRESSURE: 68 MMHG | OXYGEN SATURATION: 99 % | BODY MASS INDEX: 30.59 KG/M2 | RESPIRATION RATE: 18 BRPM | TEMPERATURE: 99 F

## 2021-10-20 DIAGNOSIS — E78.2 MIXED HYPERLIPIDEMIA: ICD-10-CM

## 2021-10-20 DIAGNOSIS — C79.9 METASTASIS FROM MALIGNANT NEOPLASM OF UTERUS (HCC): ICD-10-CM

## 2021-10-20 DIAGNOSIS — Z12.11 SCREENING FOR COLON CANCER: ICD-10-CM

## 2021-10-20 DIAGNOSIS — C55 METASTASIS FROM MALIGNANT NEOPLASM OF UTERUS (HCC): ICD-10-CM

## 2021-10-20 DIAGNOSIS — Z09 HOSPITAL DISCHARGE FOLLOW-UP: Primary | ICD-10-CM

## 2021-10-20 PROBLEM — I26.99 ACUTE PULMONARY EMBOLISM (HCC): Status: RESOLVED | Noted: 2021-07-13 | Resolved: 2021-10-20

## 2021-10-20 PROCEDURE — 99214 OFFICE O/P EST MOD 30 MIN: CPT | Performed by: INTERNAL MEDICINE

## 2021-10-20 NOTE — PROGRESS NOTES
Patient Office Visit    ASSESSMENT AND PLAN:   (Q27) Hospital discharge follow-up  (primary encounter diagnosis)  Plan: doing well postop.  Continue follow up with Dr. Tavares Tolliver     (Z12.11) Screening for colon cancer  Plan: OCCULT BLOOD, FECAL, IMMUNOASSAY infection at the site of surgery, but that has resolved after drainage. Has been following up with heme/onc and on letrozole.  Will be getting a bone density soon       Past Medical History:   Diagnosis Date   • Cancer ([facility]     abdominal tumor/ uterine can or skin lesions that are new   Neurological:  no weakness, no numbness, normal gait   Hematological:  no bruises or bleeding   Psychiatric/Behavioral: normal mood no anxiety normal behavior     /68   Pulse 72   Temp 98.9 °F (37.2 °C) (Oral)   Resp 18

## 2021-10-20 NOTE — PATIENT INSTRUCTIONS
Please take 2000 IU of vitamin D daily and   A total of 1200 mg of calcium through diet and supplement   Follow up with Dr. Keysha Johansen and Dr. Ulloa Arms

## 2021-10-25 ENCOUNTER — LAB ENCOUNTER (OUTPATIENT)
Dept: LAB | Age: 71
End: 2021-10-25
Attending: INTERNAL MEDICINE
Payer: MEDICARE

## 2021-10-25 DIAGNOSIS — Z12.11 SCREENING FOR COLON CANCER: ICD-10-CM

## 2021-10-25 PROCEDURE — 82274 ASSAY TEST FOR BLOOD FECAL: CPT

## 2021-10-28 ENCOUNTER — TELEPHONE (OUTPATIENT)
Dept: INTERNAL MEDICINE CLINIC | Facility: CLINIC | Age: 71
End: 2021-10-28

## 2021-10-28 DIAGNOSIS — R19.5 POSITIVE FIT (FECAL IMMUNOCHEMICAL TEST): Primary | ICD-10-CM

## 2021-10-28 NOTE — TELEPHONE ENCOUNTER
Patient called office and stated she has been called 3 times today and one of those calls was cut off after she answered. Patient does not know why she is being called and she would like to get a call back.  There is no TE on the the reason of the phone zachary

## 2021-10-28 NOTE — PROGRESS NOTES
Dear RN, please let the patient know   1. FIT test was positive, we will need to do a colonoscopy. Order placed.  [facility] DO

## 2021-11-19 ENCOUNTER — LAB ENCOUNTER (OUTPATIENT)
Dept: LAB | Age: 71
End: 2021-11-19
Attending: INTERNAL MEDICINE
Payer: MEDICARE

## 2021-11-19 DIAGNOSIS — Z01.818 PREOP TESTING: ICD-10-CM

## 2021-11-22 PROBLEM — K57.30 COLON, DIVERTICULOSIS: Status: ACTIVE | Noted: 2021-11-22

## 2021-11-22 PROBLEM — K63.5 POLYP OF COLON: Status: ACTIVE | Noted: 2021-11-22

## 2021-11-22 PROBLEM — K64.8 INTERNAL HEMORRHOIDS: Status: ACTIVE | Noted: 2021-11-22

## 2021-11-22 PROBLEM — R19.5 ABNORMAL FECES: Status: ACTIVE | Noted: 2021-11-22

## 2021-11-28 DIAGNOSIS — C55 METASTASIS FROM MALIGNANT NEOPLASM OF UTERUS (HCC): ICD-10-CM

## 2021-11-28 DIAGNOSIS — I26.99 ACUTE PULMONARY EMBOLISM, UNSPECIFIED PULMONARY EMBOLISM TYPE, UNSPECIFIED WHETHER ACUTE COR PULMONALE PRESENT (HCC): ICD-10-CM

## 2021-11-28 DIAGNOSIS — C79.9 METASTASIS FROM MALIGNANT NEOPLASM OF UTERUS (HCC): ICD-10-CM

## 2021-11-29 RX ORDER — LETROZOLE 2.5 MG/1
TABLET, FILM COATED ORAL
Qty: 90 TABLET | Refills: 0 | Status: SHIPPED | OUTPATIENT
Start: 2021-11-29 | End: 2022-02-25

## 2021-12-06 ENCOUNTER — HOSPITAL ENCOUNTER (OUTPATIENT)
Dept: BONE DENSITY | Age: 71
Discharge: HOME | End: 2021-12-06
Attending: INTERNAL MEDICINE
Payer: MEDICARE

## 2021-12-06 DIAGNOSIS — Z79.811 LONG TERM (CURRENT) USE OF AROMATASE INHIBITORS: ICD-10-CM

## 2021-12-06 DIAGNOSIS — I26.99 ACUTE PULMONARY EMBOLISM, UNSPECIFIED PULMONARY EMBOLISM TYPE, UNSPECIFIED WHETHER ACUTE COR PULMONALE PRESENT (HCC): ICD-10-CM

## 2021-12-06 PROCEDURE — 77080 DXA BONE DENSITY AXIAL: CPT | Performed by: INTERNAL MEDICINE

## 2021-12-11 ENCOUNTER — APPOINTMENT (OUTPATIENT)
Dept: CT IMAGING | Facility: HOSPITAL | Age: 71
End: 2021-12-11
Attending: EMERGENCY MEDICINE
Payer: MEDICARE

## 2021-12-11 ENCOUNTER — HOSPITAL ENCOUNTER (EMERGENCY)
Facility: HOSPITAL | Age: 71
Discharge: HOME | End: 2021-12-12
Attending: EMERGENCY MEDICINE
Payer: MEDICARE

## 2021-12-11 ENCOUNTER — APPOINTMENT (OUTPATIENT)
Dept: GENERAL RADIOLOGY | Facility: HOSPITAL | Age: 71
End: 2021-12-11
Attending: EMERGENCY MEDICINE
Payer: MEDICARE

## 2021-12-11 DIAGNOSIS — S52.531A CLOSED COLLES' FRACTURE OF RIGHT RADIUS, INITIAL ENCOUNTER: Primary | ICD-10-CM

## 2021-12-11 DIAGNOSIS — S01.81XA LACERATION OF FOREHEAD, INITIAL ENCOUNTER: ICD-10-CM

## 2021-12-11 PROCEDURE — 99285 EMERGENCY DEPT VISIT HI MDM: CPT | Performed by: EMERGENCY MEDICINE

## 2021-12-11 PROCEDURE — 12013 RPR F/E/E/N/L/M 2.6-5.0 CM: CPT | Performed by: EMERGENCY MEDICINE

## 2021-12-11 PROCEDURE — 25605 CLTX DST RDL FX/EPHYS SEP W/: CPT | Performed by: EMERGENCY MEDICINE

## 2021-12-11 PROCEDURE — 70450 CT HEAD/BRAIN W/O DYE: CPT | Performed by: EMERGENCY MEDICINE

## 2021-12-11 PROCEDURE — 73090 X-RAY EXAM OF FOREARM: CPT | Performed by: EMERGENCY MEDICINE

## 2021-12-11 PROCEDURE — 73110 X-RAY EXAM OF WRIST: CPT | Performed by: EMERGENCY MEDICINE

## 2021-12-12 ENCOUNTER — APPOINTMENT (OUTPATIENT)
Dept: GENERAL RADIOLOGY | Facility: HOSPITAL | Age: 71
End: 2021-12-12
Attending: EMERGENCY MEDICINE
Payer: MEDICARE

## 2021-12-12 VITALS
RESPIRATION RATE: 18 BRPM | DIASTOLIC BLOOD PRESSURE: 76 MMHG | WEIGHT: 180 LBS | HEIGHT: 66 IN | BODY MASS INDEX: 28.93 KG/M2 | OXYGEN SATURATION: 99 % | HEART RATE: 107 BPM | TEMPERATURE: 98 F | SYSTOLIC BLOOD PRESSURE: 146 MMHG

## 2021-12-12 PROCEDURE — 73110 X-RAY EXAM OF WRIST: CPT | Performed by: EMERGENCY MEDICINE

## 2021-12-12 RX ORDER — LIDOCAINE HYDROCHLORIDE 10 MG/ML
INJECTION, SOLUTION INFILTRATION; PERINEURAL
Status: COMPLETED
Start: 2021-12-12 | End: 2021-12-12

## 2021-12-12 RX ORDER — LIDOCAINE HYDROCHLORIDE 10 MG/ML
1 INJECTION, SOLUTION INFILTRATION; PERINEURAL ONCE
Status: COMPLETED | OUTPATIENT
Start: 2021-12-12 | End: 2021-12-12

## 2021-12-12 NOTE — ED INITIAL ASSESSMENT (HPI)
PT TRIPPED UP STEPS AT HOME HITTING HEAD ON DRYWALL, APPROX 3 CM, BLEEDING CONTROLLED.  DENIES LOC, DENIES THINNERS  PT ATTEMPTED TO STOP FALL BY EXTENDIGN ARMS, + RIGHT WRIST PAIN, SWELLING NOTED, + ROM

## 2021-12-12 NOTE — ED PROVIDER NOTES
Patient Seen in: [facility] Emergency Department      History   Patient presents with:  Fall  Head Neck Injury  Arm or Hand Injury    Stated Complaint: trip and fall, laceration to forehead, wrist injury/no LOC    Subjective:   HPI    80-year-old fe Never Smoker      Smokeless tobacco: Never Used    Vaping Use      Vaping Use: Never used    Alcohol use:  Yes      Alcohol/week: 1.0 standard drink      Types: 1 Cans of beer per week      Comment: social- few drinks per month    Drug use: Never midline, no facial drooping, no ptosis, muscle strength +5/5 bilateral upper and lower extremities , radial ulnar and median nerve test intact bilateral      ED Course   Labs Reviewed - No data to display       3 cm forehead laceration was anesthetized wit condition.                              Disposition and Plan     Clinical Impression:  Closed Colles' fracture of right radius, initial encounter  (primary encounter diagnosis)  Laceration of forehead, initial encounter     Disposition:  Discharge  12/12/20

## 2021-12-13 ENCOUNTER — OFFICE VISIT (OUTPATIENT)
Dept: ORTHOPEDICS CLINIC | Facility: CLINIC | Age: 71
End: 2021-12-13
Payer: MEDICARE

## 2021-12-13 ENCOUNTER — TELEPHONE (OUTPATIENT)
Dept: ORTHOPEDICS CLINIC | Facility: CLINIC | Age: 71
End: 2021-12-13

## 2021-12-13 VITALS — HEIGHT: 66 IN | BODY MASS INDEX: 29.57 KG/M2 | WEIGHT: 184 LBS

## 2021-12-13 DIAGNOSIS — S52.531A CLOSED COLLES' FRACTURE OF RIGHT RADIUS, INITIAL ENCOUNTER: Primary | ICD-10-CM

## 2021-12-13 PROCEDURE — 99204 OFFICE O/P NEW MOD 45 MIN: CPT | Performed by: ORTHOPAEDIC SURGERY

## 2021-12-13 RX ORDER — SODIUM CHLORIDE, SODIUM LACTATE, POTASSIUM CHLORIDE, CALCIUM CHLORIDE 600; 310; 30; 20 MG/100ML; MG/100ML; MG/100ML; MG/100ML
INJECTION, SOLUTION INTRAVENOUS CONTINUOUS
Status: CANCELLED | OUTPATIENT
Start: 2021-12-13

## 2021-12-13 NOTE — PROGRESS NOTES
OR BOOKING SHEET   Soft Tissue/Degenerative  Name: Kaushik Smith  MRN: [id number]   : 6/3/1950  Diagnosis:  [x] Closed Colles' fracture of right radius, initial encounter [N62.977W]    Disposition:    [x] Outpatient  [] Overnight for FRANKY  [] Overnight

## 2021-12-13 NOTE — H&P (VIEW-ONLY)
Clinic Note [facility]     Assessment/Plan:  70year old female    1. Right distal radius fracture–with loss of radial inclination, slight dorsal translation and loss of height would recommend operative fixation.   Patient was consented for surgery a 8/5/21   • Visual impairment     contacts and glasses   • Wears glasses 1990     Past Surgical History:   Procedure Laterality Date   • APPENDECTOMY     • APPENDECTOMY     • FINGER SPLINT Left     pinky finger   • HYSTERECTOMY      total   • OTHER  08/10/2 [email or web address]. org  [email or web address]  t: [id number]  f: [phone]

## 2021-12-13 NOTE — H&P
Clinic Note [facility]     Assessment/Plan:  70year old female    1. Right distal radius fracture–with loss of radial inclination, slight dorsal translation and loss of height would recommend operative fixation.   Patient was consented for surgery a 8/5/21   • Visual impairment     contacts and glasses   • Wears glasses 1990     Past Surgical History:   Procedure Laterality Date   • APPENDECTOMY     • APPENDECTOMY     • FINGER SPLINT Left     pinky finger   • HYSTERECTOMY      total   • OTHER  08/10/2 [email or web address] org  [email or web address]  t: [id number]  f: [phone]

## 2021-12-13 NOTE — TELEPHONE ENCOUNTER
patient will be seen first to see if additional imaging is needed due to completing imaging 12/12/21

## 2021-12-13 NOTE — TELEPHONE ENCOUNTER
OR BOOKING SHEET   Soft Tissue/Degenerative  Name: Severiano Jericho  MRN: [id number]   : 6/3/1950  Diagnosis:  [x]? Closed Colles' fracture of right radius, initial encounter [G93.241P]     Disposition:    [x]? Outpatient  []? Overnight for FRANKY  []?  Over

## 2021-12-13 NOTE — TELEPHONE ENCOUNTER
Pt has an appt for rt wrist fx w/Dr jasso. Last imaging 12/12. Please advice, thanks.    Future Appointments   Date Time Provider [place]   12/13/2021  2:15 PM Chas Dietz MD [id number]   1/12/2022 10:00 AM Marli Styles MD [facility]

## 2021-12-13 NOTE — TELEPHONE ENCOUNTER
Mikkimahesh Aschoff Bieniek's case has been scheduled/rescheduled at 1520 on 12/15/2021 at [facility]  Received:  Musa Freeman RMA  Patient Name: Kaushik Smith    MRN: [id number]     OPEN REDUCTION INTERNAL FIXATION OF RIGHT

## 2021-12-13 NOTE — TELEPHONE ENCOUNTER
Surgeon: Dr. Marlene Palomino    Date of Surgery: 12/15/21        Post Op Appt: 12/27/21     Facility: [facility]    Inpatient or Outpatient: Outpatient    Surgical Assistant: yes    Preadmission Testing Ordered:  yes    Pre-Op Clearance Requested:   PCP: n/a

## 2021-12-15 ENCOUNTER — APPOINTMENT (OUTPATIENT)
Dept: GENERAL RADIOLOGY | Facility: HOSPITAL | Age: 71
End: 2021-12-15
Attending: ORTHOPAEDIC SURGERY
Payer: MEDICARE

## 2021-12-15 ENCOUNTER — HOSPITAL ENCOUNTER (OUTPATIENT)
Facility: HOSPITAL | Age: 71
Setting detail: HOSPITAL OUTPATIENT SURGERY
Discharge: HOME | End: 2021-12-15
Attending: ORTHOPAEDIC SURGERY | Admitting: ORTHOPAEDIC SURGERY
Payer: MEDICARE

## 2021-12-15 ENCOUNTER — ANESTHESIA (OUTPATIENT)
Dept: SURGERY | Facility: HOSPITAL | Age: 71
End: 2021-12-15
Payer: MEDICARE

## 2021-12-15 ENCOUNTER — ANESTHESIA EVENT (OUTPATIENT)
Dept: SURGERY | Facility: HOSPITAL | Age: 71
End: 2021-12-15
Payer: MEDICARE

## 2021-12-15 VITALS
TEMPERATURE: 98 F | HEIGHT: 66 IN | OXYGEN SATURATION: 95 % | BODY MASS INDEX: 29.19 KG/M2 | WEIGHT: 181.63 LBS | RESPIRATION RATE: 16 BRPM | HEART RATE: 91 BPM | DIASTOLIC BLOOD PRESSURE: 65 MMHG | SYSTOLIC BLOOD PRESSURE: 140 MMHG

## 2021-12-15 DIAGNOSIS — S52.531A CLOSED COLLES' FRACTURE OF RIGHT RADIUS, INITIAL ENCOUNTER: ICD-10-CM

## 2021-12-15 PROCEDURE — 76000 FLUOROSCOPY <1 HR PHYS/QHP: CPT | Performed by: ORTHOPAEDIC SURGERY

## 2021-12-15 PROCEDURE — 76942 ECHO GUIDE FOR BIOPSY: CPT | Performed by: ANESTHESIOLOGY

## 2021-12-15 PROCEDURE — 0PSH04Z REPOSITION RIGHT RADIUS WITH INTERNAL FIXATION DEVICE, OPEN APPROACH: ICD-10-PCS | Performed by: ORTHOPAEDIC SURGERY

## 2021-12-15 PROCEDURE — 0L850ZZ DIVISION OF RIGHT LOWER ARM AND WRIST TENDON, OPEN APPROACH: ICD-10-PCS | Performed by: ORTHOPAEDIC SURGERY

## 2021-12-15 DEVICE — SCREW BN 2.7MM 14MM NLTX: Type: IMPLANTABLE DEVICE | Site: WRIST | Status: FUNCTIONAL

## 2021-12-15 RX ORDER — SODIUM CHLORIDE, SODIUM LACTATE, POTASSIUM CHLORIDE, CALCIUM CHLORIDE 600; 310; 30; 20 MG/100ML; MG/100ML; MG/100ML; MG/100ML
INJECTION, SOLUTION INTRAVENOUS CONTINUOUS
Status: DISCONTINUED | OUTPATIENT
Start: 2021-12-15 | End: 2021-12-15

## 2021-12-15 RX ORDER — HYDROMORPHONE HYDROCHLORIDE 1 MG/ML
0.4 INJECTION, SOLUTION INTRAMUSCULAR; INTRAVENOUS; SUBCUTANEOUS EVERY 5 MIN PRN
Status: DISCONTINUED | OUTPATIENT
Start: 2021-12-15 | End: 2021-12-15

## 2021-12-15 RX ORDER — IBUPROFEN 200 MG
200 TABLET ORAL EVERY 6 HOURS PRN
COMMUNITY

## 2021-12-15 RX ORDER — KETOROLAC TROMETHAMINE 30 MG/ML
15 INJECTION, SOLUTION INTRAMUSCULAR; INTRAVENOUS EVERY 6 HOURS PRN
Status: DISCONTINUED | OUTPATIENT
Start: 2021-12-15 | End: 2021-12-15

## 2021-12-15 RX ORDER — PHENYLEPHRINE HCL 10 MG/ML
VIAL (ML) INJECTION AS NEEDED
Status: DISCONTINUED | OUTPATIENT
Start: 2021-12-15 | End: 2021-12-15 | Stop reason: SURG

## 2021-12-15 RX ORDER — NALOXONE HYDROCHLORIDE 0.4 MG/ML
80 INJECTION, SOLUTION INTRAMUSCULAR; INTRAVENOUS; SUBCUTANEOUS AS NEEDED
Status: DISCONTINUED | OUTPATIENT
Start: 2021-12-15 | End: 2021-12-15

## 2021-12-15 RX ORDER — HYDROCODONE BITARTRATE AND ACETAMINOPHEN 5; 325 MG/1; MG/1
1 TABLET ORAL EVERY 6 HOURS PRN
Qty: 20 TABLET | Refills: 0 | Status: SHIPPED | OUTPATIENT
Start: 2021-12-15

## 2021-12-15 RX ORDER — HYDROCODONE BITARTRATE AND ACETAMINOPHEN 5; 325 MG/1; MG/1
2 TABLET ORAL AS NEEDED
Status: DISCONTINUED | OUTPATIENT
Start: 2021-12-15 | End: 2021-12-15

## 2021-12-15 RX ORDER — CEFAZOLIN SODIUM/WATER 2 G/20 ML
2 SYRINGE (ML) INTRAVENOUS ONCE
Status: COMPLETED | OUTPATIENT
Start: 2021-12-15 | End: 2021-12-15

## 2021-12-15 RX ORDER — ACETAMINOPHEN 500 MG
1000 TABLET ORAL ONCE AS NEEDED
Status: DISCONTINUED | OUTPATIENT
Start: 2021-12-15 | End: 2021-12-15

## 2021-12-15 RX ORDER — ACETAMINOPHEN 500 MG
1000 TABLET ORAL ONCE
Status: DISCONTINUED | OUTPATIENT
Start: 2021-12-15 | End: 2021-12-15

## 2021-12-15 RX ORDER — MIDAZOLAM HYDROCHLORIDE 1 MG/ML
INJECTION INTRAMUSCULAR; INTRAVENOUS AS NEEDED
Status: DISCONTINUED | OUTPATIENT
Start: 2021-12-15 | End: 2021-12-15 | Stop reason: SURG

## 2021-12-15 RX ORDER — ACETAMINOPHEN 500 MG
1000 TABLET ORAL ONCE
COMMUNITY

## 2021-12-15 RX ORDER — DEXAMETHASONE SODIUM PHOSPHATE 4 MG/ML
VIAL (ML) INJECTION AS NEEDED
Status: DISCONTINUED | OUTPATIENT
Start: 2021-12-15 | End: 2021-12-15 | Stop reason: SURG

## 2021-12-15 RX ORDER — KETOROLAC TROMETHAMINE 30 MG/ML
30 INJECTION, SOLUTION INTRAMUSCULAR; INTRAVENOUS EVERY 6 HOURS PRN
Status: DISCONTINUED | OUTPATIENT
Start: 2021-12-15 | End: 2021-12-15

## 2021-12-15 RX ORDER — ROPIVACAINE HYDROCHLORIDE 5 MG/ML
INJECTION, SOLUTION EPIDURAL; INFILTRATION; PERINEURAL AS NEEDED
Status: DISCONTINUED | OUTPATIENT
Start: 2021-12-15 | End: 2021-12-15 | Stop reason: SURG

## 2021-12-15 RX ORDER — HYDROCODONE BITARTRATE AND ACETAMINOPHEN 5; 325 MG/1; MG/1
1 TABLET ORAL AS NEEDED
Status: DISCONTINUED | OUTPATIENT
Start: 2021-12-15 | End: 2021-12-15

## 2021-12-15 RX ADMIN — PHENYLEPHRINE HCL 50 MCG: 10 MG/ML VIAL (ML) INJECTION at 16:54:00

## 2021-12-15 RX ADMIN — CEFAZOLIN SODIUM/WATER 2 G: 2 G/20 ML SYRINGE (ML) INTRAVENOUS at 15:50:00

## 2021-12-15 RX ADMIN — MIDAZOLAM HYDROCHLORIDE 2 MG: 1 INJECTION INTRAMUSCULAR; INTRAVENOUS at 15:41:00

## 2021-12-15 RX ADMIN — SODIUM CHLORIDE, SODIUM LACTATE, POTASSIUM CHLORIDE, CALCIUM CHLORIDE: 600; 310; 30; 20 INJECTION, SOLUTION INTRAVENOUS at 17:21:00

## 2021-12-15 RX ADMIN — ROPIVACAINE HYDROCHLORIDE 20 ML: 5 INJECTION, SOLUTION EPIDURAL; INFILTRATION; PERINEURAL at 15:46:00

## 2021-12-15 RX ADMIN — DEXAMETHASONE SODIUM PHOSPHATE 2 MG: 4 MG/ML VIAL (ML) INJECTION at 15:46:00

## 2021-12-15 NOTE — INTERVAL H&P NOTE
Pre-op Diagnosis: Closed Colles' fracture of right radius, initial encounter [S52.530C]    The above referenced H&P was reviewed by Essie Marshall MD on 12/15/2021, the patient was examined and no significant changes have occurred in the patient's condition

## 2021-12-15 NOTE — ANESTHESIA PREPROCEDURE EVALUATION
PRE-OP EVALUATION    Patient Name: Champ Staton    Admit Diagnosis: Closed Colles' fracture of right radius, initial encounter [S5.109F]    Pre-op Diagnosis: Closed Colles' fracture of right radius, initial encounter [address] [facility] prolapse or mitral stenosis. The noncoronary cusp of the aortic valve was calcified with a mean gradient of 11 mmHg consistent with mild aortic stenosis. 5.  There is diastolic dysfunction present. 6.  There is a pericardial fat pad present. Airway      Mallampati: II  Mouth opening: 3 FB  TM distance: 4 - 6 cm  Neck ROM: full Cardiovascular      Rhythm: regular  Rate: normal     Dental             Pulmonary    Pulmonary exam normal.                 Other findings            ASA: 3   Dmitry

## 2021-12-15 NOTE — OPERATIVE REPORT
Operative Note    Patient Name: Kaushki Smith    Preoperative Diagnosis: Closed Colles' fracture of right radius, initial encounter [A89.245U]    Postoperative Diagnosis: Closed Colles' fracture of right radius, initial encounter [A95.885R]    Surgeon(s) point. The arm was elevated and exsanguinated using Esmarch bandage. The tourniquet was raised to 250 mmHg. A 15 blade was used to make incision through the dermis using an standard FCR approach.   Smitha's pickchun and Ej scissors was used to dissect t was once again used to confirm appropriate screw trajectory. The K wires were subsequently removed. The radial cluster of screw holes were drilled to the far cortex but not through. Appropriately sized locking smooth pegs were placed.   Fluoroscopy was [email or web address]  t: [id number]  f: [phone]

## 2021-12-15 NOTE — ANESTHESIA PROCEDURE NOTES
[facility]  Performed by: Cortes Leonard MD  Authorized by: Cortes Leonard MD       General Information and Staff    Start Time:   Anesthesiologist: Cortes Leonard MD  Performed by:   Anesthesiologist  Patient Location:  OR      Site Identifica

## 2021-12-15 NOTE — ANESTHESIA POSTPROCEDURE EVALUATION
[address] [place] [address] Patient Status:  Hospital Outpatient Surgery   Age/Gender 70year old female MRN [id number]   [facility] SURGERY Attending Go Pabon MD   Hosp Day # 0 PCP Zoe Morel DO       Anesthesia Post-op

## 2021-12-16 ENCOUNTER — TELEPHONE (OUTPATIENT)
Dept: ORTHOPEDICS CLINIC | Facility: CLINIC | Age: 71
End: 2021-12-16

## 2021-12-21 ENCOUNTER — OFFICE VISIT (OUTPATIENT)
Dept: ORTHOPEDICS CLINIC | Facility: CLINIC | Age: 71
End: 2021-12-21
Payer: MEDICARE

## 2021-12-21 ENCOUNTER — HOSPITAL ENCOUNTER (OUTPATIENT)
Dept: GENERAL RADIOLOGY | Age: 71
Discharge: HOME | End: 2021-12-21
Attending: ORTHOPAEDIC SURGERY
Payer: MEDICARE

## 2021-12-21 VITALS — OXYGEN SATURATION: 99 % | HEART RATE: 102 BPM

## 2021-12-21 DIAGNOSIS — S52.531A CLOSED COLLES' FRACTURE OF RIGHT RADIUS, INITIAL ENCOUNTER: Primary | ICD-10-CM

## 2021-12-21 DIAGNOSIS — S52.531A CLOSED COLLES' FRACTURE OF RIGHT RADIUS, INITIAL ENCOUNTER: ICD-10-CM

## 2021-12-21 PROCEDURE — 99024 POSTOP FOLLOW-UP VISIT: CPT | Performed by: ORTHOPAEDIC SURGERY

## 2021-12-21 PROCEDURE — 73110 X-RAY EXAM OF WRIST: CPT | Performed by: ORTHOPAEDIC SURGERY

## 2021-12-21 NOTE — PROGRESS NOTES
[facility] Post-Op Clinic Visit    A/P: 70year old  female s/p open reduction internal fixation of right distal radius fracture on 12/15/2021 progressing as expected. Patient will begin a course of therapy.     Weight Bearing Restrictions: <1lb  Wound Care

## 2021-12-27 NOTE — PROGRESS NOTES
OCCUPATIONAL THERAPY UPPER EXTREMITY EVALUATION   Referring Physician: Dr. Brook Felton  Diagnosis:  Closed Colles' fracture of right radius, initial encounter (U79.048B) Date of Service: 12/29/2021  DOI: 12/11/21  DOS: 12/15/21   MD orders dated 12/21/21  Ass and protocol  OBJECTIVE    OBSERVATION: Unremarkable     OUTCOME MEASURE:  FOTO: 12/27/21 (Higher number= greater function)  Key functional areas of dysfunction:  Participating in recreational activities which take some force with moderate difficulty  Abil and symptoms are consistent with diagnosis of R DR Fx s/p ORIF. Patient and OT discussed evaluation findings, pathology, POC and HEP. Patient voiced understanding and performs HEP correctly without reported pain.  Skilled Occupational Therapy is medically of care (intervention strategies, specialized skills, outcome goals), indicating a low complexity: analysis of data from problem-focused assessment     These deficits impair the patient's ability to participate in ADLs, IADLs, and meaningful occupational t Ultrasound and fluidotherapy and taping  Next session: bilateral ball, bolster, Fluidotherapy, wrist proprioceptive re-ed    Education or treatment limitation: None  Rehab Potential:good    Patient was advised of these findings, precautions, and treatment

## 2021-12-29 ENCOUNTER — OFFICE VISIT (OUTPATIENT)
Dept: OCCUPATIONAL MEDICINE | Age: 71
End: 2021-12-29
Attending: ORTHOPAEDIC SURGERY
Payer: MEDICARE

## 2021-12-29 DIAGNOSIS — S52.531A CLOSED COLLES' FRACTURE OF RIGHT RADIUS, INITIAL ENCOUNTER: ICD-10-CM

## 2021-12-29 PROCEDURE — 97165 OT EVAL LOW COMPLEX 30 MIN: CPT

## 2021-12-29 PROCEDURE — 97112 NEUROMUSCULAR REEDUCATION: CPT

## 2021-12-29 PROCEDURE — 97110 THERAPEUTIC EXERCISES: CPT

## 2021-12-29 NOTE — PROGRESS NOTES
Insurance (Authorized # of Visits):  Medicare        Authorizing Physician: Dr. Amando Burton  Next MD visit: 1/11/22  Fall Risk: standard         Precautions: per orders and protocol         Diagnosis:  Closed Colles' fracture of right radius, initial encount Frequency / Duration: Patient will be seen for 2 x/week or a total of 16 visits over a 90 day period.   Treatment will include: Manual Therapy, Neuromuscular Re-education, Self-Care Home Management, Therapeutic Activities, Therapeutic Exercise, Home Exerc

## 2022-01-03 ENCOUNTER — OFFICE VISIT (OUTPATIENT)
Dept: OCCUPATIONAL MEDICINE | Age: 72
End: 2022-01-03
Attending: ORTHOPAEDIC SURGERY
Payer: MEDICARE

## 2022-01-03 PROCEDURE — 97112 NEUROMUSCULAR REEDUCATION: CPT

## 2022-01-03 PROCEDURE — 97530 THERAPEUTIC ACTIVITIES: CPT

## 2022-01-03 PROCEDURE — 97110 THERAPEUTIC EXERCISES: CPT

## 2022-01-04 NOTE — PROGRESS NOTES
Insurance (Authorized # of Visits):  Medicare        Authorizing Physician: Dr. Moni Zee MD visit: 1/11/22  Fall Risk: standard         Precautions: per orders and protocol         Diagnosis:  Closed Colles' fracture of right radius, initial encount degrees to allow for ease with wiping kahlil area. (progressing)  Patient will present with increase in right forearm supination to 65 and pronation to 85 to allow for ease with turning key. (progressing)  Patient will report no more than 1/10 pain during he table  STM for tactile input. Scar massage      Therapeutic activities:   Yarm twisting  Pepper  Therapeutic activities:  Scoop, ladle, pour: standing  HEP and self management training and education             HEP: DTM, reviewed HEP for correction

## 2022-01-05 ENCOUNTER — OFFICE VISIT (OUTPATIENT)
Dept: OCCUPATIONAL MEDICINE | Age: 72
End: 2022-01-05
Attending: ORTHOPAEDIC SURGERY
Payer: MEDICARE

## 2022-01-05 PROCEDURE — 97110 THERAPEUTIC EXERCISES: CPT

## 2022-01-05 PROCEDURE — 97112 NEUROMUSCULAR REEDUCATION: CPT

## 2022-01-05 PROCEDURE — 97530 THERAPEUTIC ACTIVITIES: CPT

## 2022-01-07 NOTE — PROGRESS NOTES
Insurance (Authorized # of Visits):  Medicare        Authorizing Physician: Dr. Rosaura Chapa  Next MD visit: 1/11/22  Fall Risk: standard         Precautions: per orders and protocol         Diagnosis:  Closed Colles' fracture of right radius, initial encount achieved in OT. (progressing)  Patient will present with increase in right wrist extension to 60 and flexion to 55 degrees to allow for ease with wiping kahlil area. (progressing)  Patient will present with increase in right forearm supination to 65 and pron proprioceptive input  Bilateral bolster roll, for same, seated only due to knee status  Ball roll on table  Bilateral tennis ball on frisbee  Hand bosu board for proprioceptive input to wrist  STM for tactile input.  Neuromuscular re-education:  Ball roll o

## 2022-01-10 ENCOUNTER — OFFICE VISIT (OUTPATIENT)
Dept: OCCUPATIONAL MEDICINE | Age: 72
End: 2022-01-10
Attending: ORTHOPAEDIC SURGERY
Payer: MEDICARE

## 2022-01-10 PROCEDURE — 97110 THERAPEUTIC EXERCISES: CPT

## 2022-01-10 PROCEDURE — 97112 NEUROMUSCULAR REEDUCATION: CPT

## 2022-01-10 NOTE — PROGRESS NOTES
Insurance (Authorized # of Visits):  Medicare        Authorizing Physician: Dr. Moni Zee MD visit: 1/11/22  Fall Risk: standard         Precautions: per orders and protocol         Diagnosis:  Closed Colles' fracture of right radius, initial encount above, but edema persists.   Fitted with TB size E.      Goals:  (to be met in 16 visits)    Patient will be independent and compliant with comprehensive HEP to maintain progress achieved in OT. (progressing)  Patient will present with increase in right wri pillow  DTM, gyrostick  Wrist isometrics  Intrinsic isometrics  PROM: pillow stretches  Chin and prayer stretches taught for HEP Therapeutic exercises:   Reviewed supination passive positioning.   Putty intrinsic and thumb exercises for carpal stability: se Day  Thumb Adduction Theraputty -  Repeat 10 Times, Complete 1 Set, Perform 1 Times a Day  HAND - MEDIAN NERVE GLIDE -  Repeat 10 Times, Hold 1 Second(s), Complete 1 Set, Perform 4 Times a Day    Charges: 2 TherEx, 1 NM re-ed       Total Timed Treatment: 4

## 2022-01-11 ENCOUNTER — HOSPITAL ENCOUNTER (OUTPATIENT)
Dept: GENERAL RADIOLOGY | Age: 72
Discharge: HOME | End: 2022-01-11
Attending: ORTHOPAEDIC SURGERY
Payer: MEDICARE

## 2022-01-11 ENCOUNTER — OFFICE VISIT (OUTPATIENT)
Dept: ORTHOPEDICS CLINIC | Facility: CLINIC | Age: 72
End: 2022-01-11
Payer: MEDICARE

## 2022-01-11 VITALS — BODY MASS INDEX: 28.93 KG/M2 | HEIGHT: 66 IN | WEIGHT: 180 LBS

## 2022-01-11 DIAGNOSIS — S52.531A CLOSED COLLES' FRACTURE OF RIGHT RADIUS, INITIAL ENCOUNTER: ICD-10-CM

## 2022-01-11 DIAGNOSIS — S52.531A CLOSED COLLES' FRACTURE OF RIGHT RADIUS, INITIAL ENCOUNTER: Primary | ICD-10-CM

## 2022-01-11 PROCEDURE — 73110 X-RAY EXAM OF WRIST: CPT | Performed by: ORTHOPAEDIC SURGERY

## 2022-01-11 PROCEDURE — 99024 POSTOP FOLLOW-UP VISIT: CPT | Performed by: ORTHOPAEDIC SURGERY

## 2022-01-11 NOTE — PROGRESS NOTES
[facility] H[facility]ology [facility] Oncology Clinic Note    Visit Diagnosis:  Acute pulmonary embolism, unspecified pulmonary embolism type, unspecified whether acute cor pulmonale present ([address]  (primary encounter diagnosis)  Metastasis from malignant neoplasm of uteru Bilateral dopplers negative. For her PE, she was started on Lovenox. · 8/10/21: RLQ Radical Abdominal soft tissue resection with Dr. Davey Lujan on 8/10/21. Endometrial adenocarcinoma, endometrioid type, 13.5 cm, Grade 1, negative margins, 0/2 LN.    · 9/10/21: 08/10/2021    Radical resection of right lower abdominal wall and proximal right thigh metastatic carcinoma with sartorius fascia flap transposition and  repair of resultant defect with mesh. Use of SPY to evaluate tissue perfusion.    • PERC PLACEMENT OF I necrotic change.  This is most likely a soft tissue neoplasm and biopsy is recommended. 2. There is regional enlarged lymph node in right external iliac region.    3. There is a soft tissue mass within rectus sheath in the left lower quadrant which could with a possible malignancy. · She noticed a right lower quadrant mass in June 2021 after 65 pounds of intentional weight loss. It is unclear on how long this mass has been present. · S/P resection of RLQ abdominal wall mass on 8/10/21 with Dr. Hugo Echevarria.  Mercedes Mckeon

## 2022-01-11 NOTE — PROGRESS NOTES
[facility] Post-Op Clinic Visit    A/P: 70year old  female s/p open reduction internal fixation of right distal radius fracture on 12/15/2021 progressing as expected. Continue therapy.   We will monitor the median nerve neuritis which occurred about a coup

## 2022-01-12 ENCOUNTER — OFFICE VISIT (OUTPATIENT)
Dept: HEMATOLOGY/ONCOLOGY | Facility: HOSPITAL | Age: 72
End: 2022-01-12
Attending: INTERNAL MEDICINE
Payer: MEDICARE

## 2022-01-12 ENCOUNTER — OFFICE VISIT (OUTPATIENT)
Dept: OCCUPATIONAL MEDICINE | Age: 72
End: 2022-01-12
Attending: ORTHOPAEDIC SURGERY
Payer: MEDICARE

## 2022-01-12 VITALS
SYSTOLIC BLOOD PRESSURE: 155 MMHG | BODY MASS INDEX: 29.66 KG/M2 | DIASTOLIC BLOOD PRESSURE: 84 MMHG | TEMPERATURE: 98 F | HEIGHT: 65 IN | WEIGHT: 178 LBS | HEART RATE: 91 BPM | OXYGEN SATURATION: 97 % | RESPIRATION RATE: 16 BRPM

## 2022-01-12 DIAGNOSIS — Z79.899 OTHER LONG TERM (CURRENT) DRUG THERAPY: ICD-10-CM

## 2022-01-12 DIAGNOSIS — I26.99 ACUTE PULMONARY EMBOLISM, UNSPECIFIED PULMONARY EMBOLISM TYPE, UNSPECIFIED WHETHER ACUTE COR PULMONALE PRESENT (HCC): Primary | ICD-10-CM

## 2022-01-12 DIAGNOSIS — C55 METASTASIS FROM MALIGNANT NEOPLASM OF UTERUS (HCC): ICD-10-CM

## 2022-01-12 DIAGNOSIS — C79.9 METASTASIS FROM MALIGNANT NEOPLASM OF UTERUS (HCC): ICD-10-CM

## 2022-01-12 LAB
ALBUMIN SERPL-MCNC: 3.8 G/DL (ref 3.4–5)
ALBUMIN/GLOB SERPL: 1 {RATIO} (ref 1–2)
ALP LIVER SERPL-CCNC: 79 U/L
ALT SERPL-CCNC: 14 U/L
ANION GAP SERPL CALC-SCNC: 6 MMOL/L (ref 0–18)
AST SERPL-CCNC: 6 U/L (ref 15–37)
BASOPHILS # BLD AUTO: 0.07 X10(3) UL (ref 0–0.2)
BASOPHILS NFR BLD AUTO: 0.8 %
BILIRUB SERPL-MCNC: 0.6 MG/DL (ref 0.1–2)
BUN BLD-MCNC: 16 MG/DL (ref 7–18)
CALCIUM BLD-MCNC: 10 MG/DL (ref 8.5–10.1)
CHLORIDE SERPL-SCNC: 108 MMOL/L (ref 98–112)
CHOLEST SERPL-MCNC: 156 MG/DL (ref ?–200)
CO2 SERPL-SCNC: 26 MMOL/L (ref 21–32)
CREAT BLD-MCNC: 0.76 MG/DL
EOSINOPHIL # BLD AUTO: 0.16 X10(3) UL (ref 0–0.7)
EOSINOPHIL NFR BLD AUTO: 1.8 %
ERYTHROCYTE [DISTWIDTH] IN BLOOD BY AUTOMATED COUNT: 13.2 %
GLOBULIN PLAS-MCNC: 3.8 G/DL (ref 2.8–4.4)
GLUCOSE BLD-MCNC: 104 MG/DL (ref 70–99)
HCT VFR BLD AUTO: 39 %
HDLC SERPL-MCNC: 47 MG/DL (ref 40–59)
HGB BLD-MCNC: 12.3 G/DL
IMM GRANULOCYTES # BLD AUTO: 0.03 X10(3) UL (ref 0–1)
IMM GRANULOCYTES NFR BLD: 0.3 %
LDLC SERPL CALC-MCNC: 91 MG/DL (ref ?–100)
LYMPHOCYTES # BLD AUTO: 2.21 X10(3) UL (ref 1–4)
LYMPHOCYTES NFR BLD AUTO: 24.4 %
MCH RBC QN AUTO: 26.6 PG (ref 26–34)
MCHC RBC AUTO-ENTMCNC: 31.5 G/DL (ref 31–37)
MCV RBC AUTO: 84.4 FL
MONOCYTES # BLD AUTO: 0.59 X10(3) UL (ref 0.1–1)
MONOCYTES NFR BLD AUTO: 6.5 %
NEUTROPHILS # BLD AUTO: 6 X10 (3) UL (ref 1.5–7.7)
NEUTROPHILS # BLD AUTO: 6 X10(3) UL (ref 1.5–7.7)
NEUTROPHILS NFR BLD AUTO: 66.2 %
NONHDLC SERPL-MCNC: 109 MG/DL (ref ?–130)
OSMOLALITY SERPL CALC.SUM OF ELEC: 291 MOSM/KG (ref 275–295)
PLATELET # BLD AUTO: 252 10(3)UL (ref 150–450)
POTASSIUM SERPL-SCNC: 4 MMOL/L (ref 3.5–5.1)
PROT SERPL-MCNC: 7.6 G/DL (ref 6.4–8.2)
RBC # BLD AUTO: 4.62 X10(6)UL
SODIUM SERPL-SCNC: 140 MMOL/L (ref 136–145)
TRIGL SERPL-MCNC: 98 MG/DL (ref 30–149)
VLDLC SERPL CALC-MCNC: 16 MG/DL (ref 0–30)
WBC # BLD AUTO: 9.1 X10(3) UL (ref 4–11)

## 2022-01-12 PROCEDURE — 97110 THERAPEUTIC EXERCISES: CPT

## 2022-01-12 PROCEDURE — 99214 OFFICE O/P EST MOD 30 MIN: CPT | Performed by: INTERNAL MEDICINE

## 2022-01-12 PROCEDURE — 97112 NEUROMUSCULAR REEDUCATION: CPT

## 2022-01-12 RX ORDER — ALENDRONATE SODIUM 35 MG/1
35 TABLET ORAL
Qty: 4 TABLET | Refills: 11 | Status: SHIPPED | OUTPATIENT
Start: 2022-01-12

## 2022-01-12 NOTE — PROGRESS NOTES
Outpatient Oncology Care Plan   Problem list:   knowledge deficit   Problems related to:   self care   Interventions:   provided general teaching   Expected outcomes:   understands plan of care   Progress towards outcome: making progress     Education [place]

## 2022-01-13 NOTE — PROGRESS NOTES
Insurance (Authorized # of Visits):  Medicare        Authorizing Physician: Dr. Maddie Johnson  Next MD visit: 2/8/22  Fall Risk: standard         Precautions: per orders and protocol         Diagnosis:  Closed Colles' fracture of right radius, initial encounte 8           Assessment: Paresthesias persist in Median nerve, easily provoked. Min cues for HEP. Incision continues red where noted last session. Not itchy or burning or with other symptoms.     Goals:  (to be met in 16 visits)    Patient will be inde measurements taken and reviewed with patient.  Therapeutic exercises:   Fluidotherapy with AROM x wrist, forearm and digits  10 minutes  PROM x wrist into pillow  DTM, gyrostick  Wrist isometrics  Intrinsic isometrics  PROM: pillow stretches  Chin and praye management training and education Therapeutic activities:    None today Therapeutic activities:  None today   Therapeutic activities:  pepper   Sifter for gripping  Scrubbing with sponge: standing          HEP:Continue soft putty:  PUTTY KEY  -

## 2022-01-17 ENCOUNTER — OFFICE VISIT (OUTPATIENT)
Dept: OCCUPATIONAL MEDICINE | Age: 72
End: 2022-01-17
Attending: ORTHOPAEDIC SURGERY
Payer: MEDICARE

## 2022-01-17 PROCEDURE — 97110 THERAPEUTIC EXERCISES: CPT

## 2022-01-17 PROCEDURE — 97112 NEUROMUSCULAR REEDUCATION: CPT

## 2022-01-17 PROCEDURE — 97530 THERAPEUTIC ACTIVITIES: CPT

## 2022-01-17 NOTE — PROGRESS NOTES
Insurance (Authorized # of Visits):  Medicare        Authorizing Physician: Dr. Lanette Varner  Next MD visit: 2/8/22  Fall Risk: standard         Precautions: per orders and protocol         Diagnosis:  Closed Colles' fracture of right radius, initial encounte change in ROM, but edema is slighty reduced. Incision remains red and swollen in three spots, as previously noted. Superficial bleeding at end of scar with massage.     Goals:  (to be met in 16 visits)    Patient will be independent and compliant with com pillow  DTM, gyrostick  Wrist isometrics  Intrinsic isometrics  PROM: pillow stretches  Chin and prayer stretches taught for HEP Therapeutic exercises:   Reviewed supination passive positioning.   Putty intrinsic and thumb exercises for carpal stability: se gripping  Scrubbing with sponge: standing Therapeutic activities:  Wall washes: 2 minutes  Gyro stick 1 minute       Rock tape applied in Pisiform lift.    HEP:Continue soft putty:  PUTTY KEY  -  Repeat 10 Times, Complete 1 Set, Perform 1 Times a Day  P

## 2022-01-19 ENCOUNTER — OFFICE VISIT (OUTPATIENT)
Dept: OCCUPATIONAL MEDICINE | Age: 72
End: 2022-01-19
Attending: ORTHOPAEDIC SURGERY
Payer: MEDICARE

## 2022-01-19 PROCEDURE — 97110 THERAPEUTIC EXERCISES: CPT

## 2022-01-19 PROCEDURE — 97530 THERAPEUTIC ACTIVITIES: CPT

## 2022-01-19 PROCEDURE — 97112 NEUROMUSCULAR REEDUCATION: CPT

## 2022-01-24 ENCOUNTER — TELEPHONE (OUTPATIENT)
Dept: PHYSICAL THERAPY | Facility: HOSPITAL | Age: 72
End: 2022-01-24

## 2022-01-24 ENCOUNTER — APPOINTMENT (OUTPATIENT)
Dept: OCCUPATIONAL MEDICINE | Age: 72
End: 2022-01-24
Attending: ORTHOPAEDIC SURGERY
Payer: MEDICARE

## 2022-01-24 NOTE — PROGRESS NOTES
Insurance (Authorized # of Visits):  Medicare        Authorizing Physician: Dr. Andrew Jiang  Next MD visit: 2/8/22  Fall Risk: standard         Precautions: per orders and protocol         Diagnosis:  Closed Colles' fracture of right radius, initial encounte  Strength (lbs) Right   Date: 1/26/22 Left  Date: 1/26/22    20 41   3 Point Pinch  6 9.5   Lateral Pinch 11 11       Assessment: Patient presents with increase in ROM as noted above, but edema persists at wrist. Ulnar sided forearm and wrist isometrics  PROM: pillow stretches  Chin and prayer stretches taught for HEP Therapeutic exercises:   Reviewed supination passive positioning.   Putty intrinsic and thumb exercises for carpal stability: see below Therapeutic exercises:   Putty intrinsic and (yellow)   Therapeutic activities:    None today Therapeutic activities:  None today   Therapeutic activities:  pepper   Sifter for gripping  Scrubbing with sponge: standing Therapeutic activities:  Wall washes: 2 minutes  Gyro stick 1 minute Therap

## 2022-01-26 ENCOUNTER — OFFICE VISIT (OUTPATIENT)
Dept: OCCUPATIONAL MEDICINE | Age: 72
End: 2022-01-26
Attending: ORTHOPAEDIC SURGERY
Payer: MEDICARE

## 2022-01-26 PROCEDURE — 97112 NEUROMUSCULAR REEDUCATION: CPT

## 2022-01-26 PROCEDURE — 97110 THERAPEUTIC EXERCISES: CPT

## 2022-01-28 NOTE — PROGRESS NOTES
ProgressSummary  Pt has attended 9 visits in Occupational Therapy.       Insurance (Authorized # of Visits):  Medicare        Authorizing Physician: Dr. Jeffy Austin  Next MD visit: 2/8/22  Fall Risk: standard         Precautions: per orders and protocol completed: 1/31/22  Item Level of difficulty   hammer Mild    Hobbies (needlework none   Open jar none   Pushing up on hand mild   carryiing shopping bag mild   Severity of weakness mild   Patient's physical FS Primary Measure: 67 (higher number=greater fu supination to 65 and pronation to 85 to allow for ease with turning key.  (met 1/12/22)  Patient will report no more than 1/10 pain during heavy homemaking skill performance (progressing)  Patient will present with  strength in right hand equal to left twist: yellow, 10x each direction  Wrist PRE's: 3 sets of 15 with 1#  Forearm rotation: 2 minutes with holding yellow flexbar   Neuromuscular re-education:    Unilateral and bilateral tennis ball on frisbee  Bilateral ball SROM  STM for tactile input.    Ne 1 Set, Perform 1 Times a Day  PUTTY ABDUCTION  LOOP -  Repeat 10 Times, Complete 1 Set, Perform 1 Times a Day  Two-Handed Key Pinch Theraputty -  Repeat 10 Times, Complete 1 Set, Perform 1 Times a Day  Thumb Adduction Theraputty -  Repeat 10 Times, Complet

## 2022-01-31 ENCOUNTER — OFFICE VISIT (OUTPATIENT)
Dept: OCCUPATIONAL MEDICINE | Age: 72
End: 2022-01-31
Attending: ORTHOPAEDIC SURGERY
Payer: MEDICARE

## 2022-01-31 PROCEDURE — 97530 THERAPEUTIC ACTIVITIES: CPT

## 2022-01-31 PROCEDURE — 97112 NEUROMUSCULAR REEDUCATION: CPT

## 2022-01-31 PROCEDURE — 97110 THERAPEUTIC EXERCISES: CPT

## 2022-02-02 ENCOUNTER — TELEPHONE (OUTPATIENT)
Dept: PHYSICAL THERAPY | Facility: HOSPITAL | Age: 72
End: 2022-02-02

## 2022-02-02 ENCOUNTER — APPOINTMENT (OUTPATIENT)
Dept: OCCUPATIONAL MEDICINE | Age: 72
End: 2022-02-02
Attending: ORTHOPAEDIC SURGERY
Payer: MEDICARE

## 2022-02-04 ENCOUNTER — OFFICE VISIT (OUTPATIENT)
Dept: OCCUPATIONAL MEDICINE | Age: 72
End: 2022-02-04
Attending: INTERNAL MEDICINE
Payer: MEDICARE

## 2022-02-04 PROCEDURE — 97530 THERAPEUTIC ACTIVITIES: CPT

## 2022-02-04 PROCEDURE — 97112 NEUROMUSCULAR REEDUCATION: CPT

## 2022-02-04 PROCEDURE — 97110 THERAPEUTIC EXERCISES: CPT

## 2022-02-07 ENCOUNTER — OFFICE VISIT (OUTPATIENT)
Dept: OCCUPATIONAL MEDICINE | Age: 72
End: 2022-02-07
Attending: ORTHOPAEDIC SURGERY
Payer: MEDICARE

## 2022-02-07 PROCEDURE — 97110 THERAPEUTIC EXERCISES: CPT

## 2022-02-07 PROCEDURE — 97530 THERAPEUTIC ACTIVITIES: CPT

## 2022-02-07 PROCEDURE — 97112 NEUROMUSCULAR REEDUCATION: CPT

## 2022-02-08 ENCOUNTER — OFFICE VISIT (OUTPATIENT)
Dept: ORTHOPEDICS CLINIC | Facility: CLINIC | Age: 72
End: 2022-02-08
Payer: MEDICARE

## 2022-02-08 ENCOUNTER — HOSPITAL ENCOUNTER (OUTPATIENT)
Dept: GENERAL RADIOLOGY | Age: 72
Discharge: HOME | End: 2022-02-08
Attending: ORTHOPAEDIC SURGERY
Payer: MEDICARE

## 2022-02-08 VITALS — WEIGHT: 178 LBS | HEIGHT: 66 IN | BODY MASS INDEX: 28.61 KG/M2

## 2022-02-08 DIAGNOSIS — S52.531A CLOSED COLLES' FRACTURE OF RIGHT RADIUS, INITIAL ENCOUNTER: ICD-10-CM

## 2022-02-08 DIAGNOSIS — S52.531A CLOSED COLLES' FRACTURE OF RIGHT RADIUS, INITIAL ENCOUNTER: Primary | ICD-10-CM

## 2022-02-08 PROCEDURE — 99024 POSTOP FOLLOW-UP VISIT: CPT | Performed by: ORTHOPAEDIC SURGERY

## 2022-02-08 PROCEDURE — 73110 X-RAY EXAM OF WRIST: CPT | Performed by: ORTHOPAEDIC SURGERY

## 2022-02-08 NOTE — PROGRESS NOTES
[facility] Post-Op Clinic Visit    A/P: 70year old  female s/p open reduction internal fixation of right distal radius fracture on 12/15/2021 progressing as expected. Fracture is healed. Continue therapy for strengthening. Right thumb tip numbness is stable possibly improving per patient. Will monitor, possibly carpal tunnel syndrome. If it does not improve significantly by next follow-up visit would recommend obtaining an EMG/NCV. Patient can discontinue the use of the wrist brace at this point. Okay to use while outside in snowy icy conditions for protection. Weight Bearing Restrictions: No restrictions  Wound Care: no restrictions    Next Visit: 4 weeks    HPI:  Patient reports no postop pain. Stiffness is improving. She has thumb tip numbness that is intermittent. Possibly improving. She had an episode either last night or the night before when she had numbness and tingling in the ulnar nerve distribution. But that seems to be a singular event. No nausea/vomiting. No fevers/chills. Physical Exam:  General: Alert. Oriented x3. Appears comfortable. Right Upper Extremity:   Skin: incision healed. Edema: resolving post-op edema  Neuro: normal sensation in median, ulnar, and radial sensory nerve distribution distally. normal motor function of median, ulnar, AIN, and PIN inntervated muscles distally  Motion: Patient is able to make composite fist.  Wrist extension and wrist flexion is about 45 degrees. Pronation near normal. Supination lacking 10-15deg        Leon Hardin MD  Hand, Wrist, & Elbow Surgery  [facility]  [facility] [address], [address] [place], [place]   [email or web address]  [email or web address]  t: [id number]  f: [phone]

## 2022-02-09 ENCOUNTER — OFFICE VISIT (OUTPATIENT)
Dept: OCCUPATIONAL MEDICINE | Age: 72
End: 2022-02-09
Attending: INTERNAL MEDICINE
Payer: MEDICARE

## 2022-02-09 PROCEDURE — 97112 NEUROMUSCULAR REEDUCATION: CPT

## 2022-02-09 PROCEDURE — 97110 THERAPEUTIC EXERCISES: CPT

## 2022-02-09 PROCEDURE — 97530 THERAPEUTIC ACTIVITIES: CPT

## 2022-02-14 ENCOUNTER — OFFICE VISIT (OUTPATIENT)
Dept: OCCUPATIONAL MEDICINE | Age: 72
End: 2022-02-14
Attending: INTERNAL MEDICINE
Payer: MEDICARE

## 2022-02-14 PROCEDURE — 97110 THERAPEUTIC EXERCISES: CPT

## 2022-02-14 PROCEDURE — 97530 THERAPEUTIC ACTIVITIES: CPT

## 2022-02-16 ENCOUNTER — OFFICE VISIT (OUTPATIENT)
Dept: OCCUPATIONAL MEDICINE | Age: 72
End: 2022-02-16
Attending: ORTHOPAEDIC SURGERY
Payer: MEDICARE

## 2022-02-16 PROCEDURE — 97530 THERAPEUTIC ACTIVITIES: CPT

## 2022-02-16 PROCEDURE — 97110 THERAPEUTIC EXERCISES: CPT

## 2022-02-21 ENCOUNTER — APPOINTMENT (OUTPATIENT)
Dept: OCCUPATIONAL MEDICINE | Age: 72
End: 2022-02-21
Attending: ORTHOPAEDIC SURGERY
Payer: MEDICARE

## 2022-02-25 DIAGNOSIS — I26.99 ACUTE PULMONARY EMBOLISM, UNSPECIFIED PULMONARY EMBOLISM TYPE, UNSPECIFIED WHETHER ACUTE COR PULMONALE PRESENT (HCC): ICD-10-CM

## 2022-02-25 DIAGNOSIS — C79.9 METASTASIS FROM MALIGNANT NEOPLASM OF UTERUS (HCC): ICD-10-CM

## 2022-02-25 DIAGNOSIS — C55 METASTASIS FROM MALIGNANT NEOPLASM OF UTERUS (HCC): ICD-10-CM

## 2022-02-25 RX ORDER — LETROZOLE 2.5 MG/1
TABLET, FILM COATED ORAL
Qty: 90 TABLET | Refills: 0 | Status: ON HOLD | OUTPATIENT
Start: 2022-02-25 | End: 2022-05-17

## 2022-03-08 ENCOUNTER — OFFICE VISIT (OUTPATIENT)
Dept: ORTHOPEDICS CLINIC | Facility: CLINIC | Age: 72
End: 2022-03-08
Payer: MEDICARE

## 2022-03-08 VITALS — BODY MASS INDEX: 28.93 KG/M2 | HEIGHT: 66 IN | WEIGHT: 180 LBS

## 2022-03-08 DIAGNOSIS — S52.531A CLOSED COLLES' FRACTURE OF RIGHT RADIUS, INITIAL ENCOUNTER: Primary | ICD-10-CM

## 2022-03-08 PROCEDURE — 99024 POSTOP FOLLOW-UP VISIT: CPT | Performed by: ORTHOPAEDIC SURGERY

## 2022-03-08 NOTE — PROGRESS NOTES
[facility] Post-Op Clinic Visit    A/P: 70year old  female s/p open reduction internal fixation of right distal radius fracture on 12/15/2021 progressing as expected. Fracture is healed. Continue self-directed therapy. We will continue to monitor the thumb numbness and index finger numbness as it is improving. If at 3 months her symptoms or not fully resolve we will obtain an EMG and discuss carpal tunnel release. Weight Bearing Restrictions: No restrictions  Wound Care: no restrictions    Next Visit: 3 months    HPI:  Patient reports no postop pain. Stiffness is improving. She has thumb tip numbness that is intermittent. That is improving. She still has some difficulty with  strength. No nausea/vomiting. No fevers/chills. Physical Exam:  General: Alert. Oriented x3. Appears comfortable. Right Upper Extremity:   Skin: incision healed. Edema: resolving post-op edema  Neuro: normal sensation in ulnar, and radial sensory nerve distribution distally. normal motor function of median, ulnar, AIN, and PIN inntervated muscles distally  Thumb numbness and index finger numbness that is improving. Motion: Patient is able to make composite fist.  Wrist extension and wrist flexion is about 45 degrees. Pronation near normal. Supination lacking 10-15deg        Jude Pena MD  Hand, Wrist, & Elbow Surgery  [facility]  [facility] [address], [address], [place]. [email or web address]  t: [id number]  f: [phone]

## 2022-04-06 ENCOUNTER — HOSPITAL ENCOUNTER (OUTPATIENT)
Dept: CT IMAGING | Age: 72
Discharge: HOME | End: 2022-04-06
Attending: INTERNAL MEDICINE
Payer: MEDICARE

## 2022-04-06 DIAGNOSIS — C79.9 METASTASIS FROM MALIGNANT NEOPLASM OF UTERUS (HCC): ICD-10-CM

## 2022-04-06 DIAGNOSIS — C55 METASTASIS FROM MALIGNANT NEOPLASM OF UTERUS (HCC): ICD-10-CM

## 2022-04-06 LAB — CREAT BLD-MCNC: 0.7 MG/DL

## 2022-04-06 PROCEDURE — 82565 ASSAY OF CREATININE: CPT

## 2022-04-06 PROCEDURE — 71260 CT THORAX DX C+: CPT | Performed by: INTERNAL MEDICINE

## 2022-04-06 PROCEDURE — 74177 CT ABD & PELVIS W/CONTRAST: CPT | Performed by: INTERNAL MEDICINE

## 2022-04-06 RX ORDER — IOHEXOL 350 MG/ML
100 INJECTION, SOLUTION INTRAVENOUS
Status: COMPLETED | OUTPATIENT
Start: 2022-04-06 | End: 2022-04-06

## 2022-04-06 RX ADMIN — IOHEXOL 100 ML: 350 INJECTION, SOLUTION INTRAVENOUS at 09:14:00

## 2022-04-13 ENCOUNTER — OFFICE VISIT (OUTPATIENT)
Dept: HEMATOLOGY/ONCOLOGY | Facility: HOSPITAL | Age: 72
End: 2022-04-13
Attending: INTERNAL MEDICINE
Payer: MEDICARE

## 2022-04-13 ENCOUNTER — OFFICE VISIT (OUTPATIENT)
Dept: SURGERY | Facility: CLINIC | Age: 72
End: 2022-04-13
Payer: MEDICARE

## 2022-04-13 VITALS
TEMPERATURE: 97 F | RESPIRATION RATE: 18 BRPM | HEART RATE: 108 BPM | DIASTOLIC BLOOD PRESSURE: 86 MMHG | SYSTOLIC BLOOD PRESSURE: 147 MMHG | BODY MASS INDEX: 30.16 KG/M2 | WEIGHT: 181 LBS | HEIGHT: 65 IN | OXYGEN SATURATION: 97 %

## 2022-04-13 VITALS
TEMPERATURE: 97 F | RESPIRATION RATE: 16 BRPM | OXYGEN SATURATION: 96 % | DIASTOLIC BLOOD PRESSURE: 83 MMHG | HEART RATE: 94 BPM | SYSTOLIC BLOOD PRESSURE: 137 MMHG | BODY MASS INDEX: 30 KG/M2 | WEIGHT: 179.81 LBS

## 2022-04-13 DIAGNOSIS — Z01.818 PRE-OP TESTING: Primary | ICD-10-CM

## 2022-04-13 DIAGNOSIS — C55 METASTASIS FROM MALIGNANT NEOPLASM OF UTERUS (HCC): Primary | ICD-10-CM

## 2022-04-13 DIAGNOSIS — C54.1 ENDOMETRIAL CARCINOMA (HCC): ICD-10-CM

## 2022-04-13 DIAGNOSIS — C79.9 METASTASIS FROM MALIGNANT NEOPLASM OF UTERUS (HCC): ICD-10-CM

## 2022-04-13 DIAGNOSIS — C79.9 METASTASIS FROM MALIGNANT NEOPLASM OF UTERUS (HCC): Primary | ICD-10-CM

## 2022-04-13 DIAGNOSIS — I26.99 ACUTE PULMONARY EMBOLISM, UNSPECIFIED PULMONARY EMBOLISM TYPE, UNSPECIFIED WHETHER ACUTE COR PULMONALE PRESENT (HCC): ICD-10-CM

## 2022-04-13 DIAGNOSIS — C55 METASTASIS FROM MALIGNANT NEOPLASM OF UTERUS (HCC): ICD-10-CM

## 2022-04-13 DIAGNOSIS — C78.6 PERITONEAL CARCINOMATOSIS (HCC): ICD-10-CM

## 2022-04-13 LAB
ALBUMIN SERPL-MCNC: 4.1 G/DL (ref 3.4–5)
ALBUMIN/GLOB SERPL: 1.1 {RATIO} (ref 1–2)
ALP LIVER SERPL-CCNC: 73 U/L
ALT SERPL-CCNC: 14 U/L
ANION GAP SERPL CALC-SCNC: 7 MMOL/L (ref 0–18)
AST SERPL-CCNC: 7 U/L (ref 15–37)
BASOPHILS # BLD AUTO: 0.06 X10(3) UL (ref 0–0.2)
BASOPHILS NFR BLD AUTO: 0.7 %
BILIRUB SERPL-MCNC: 0.6 MG/DL (ref 0.1–2)
BUN BLD-MCNC: 12 MG/DL (ref 7–18)
CALCIUM BLD-MCNC: 10.2 MG/DL (ref 8.5–10.1)
CANCER AG125 SERPL-ACNC: 15.6 U/ML (ref ?–35)
CHLORIDE SERPL-SCNC: 106 MMOL/L (ref 98–112)
CO2 SERPL-SCNC: 27 MMOL/L (ref 21–32)
CREAT BLD-MCNC: 0.91 MG/DL
EOSINOPHIL # BLD AUTO: 0.09 X10(3) UL (ref 0–0.7)
EOSINOPHIL NFR BLD AUTO: 1 %
ERYTHROCYTE [DISTWIDTH] IN BLOOD BY AUTOMATED COUNT: 12.9 %
FASTING STATUS PATIENT QL REPORTED: NO
GLOBULIN PLAS-MCNC: 3.9 G/DL (ref 2.8–4.4)
GLUCOSE BLD-MCNC: 107 MG/DL (ref 70–99)
HCT VFR BLD AUTO: 40.8 %
HGB BLD-MCNC: 12.9 G/DL
IMM GRANULOCYTES # BLD AUTO: 0.03 X10(3) UL (ref 0–1)
IMM GRANULOCYTES NFR BLD: 0.3 %
LYMPHOCYTES # BLD AUTO: 1.92 X10(3) UL (ref 1–4)
LYMPHOCYTES NFR BLD AUTO: 21.9 %
MCH RBC QN AUTO: 26.9 PG (ref 26–34)
MCHC RBC AUTO-ENTMCNC: 31.6 G/DL (ref 31–37)
MCV RBC AUTO: 85.2 FL
MONOCYTES # BLD AUTO: 0.51 X10(3) UL (ref 0.1–1)
MONOCYTES NFR BLD AUTO: 5.8 %
NEUTROPHILS # BLD AUTO: 6.16 X10 (3) UL (ref 1.5–7.7)
NEUTROPHILS # BLD AUTO: 6.16 X10(3) UL (ref 1.5–7.7)
NEUTROPHILS NFR BLD AUTO: 70.3 %
OSMOLALITY SERPL CALC.SUM OF ELEC: 290 MOSM/KG (ref 275–295)
PLATELET # BLD AUTO: 255 10(3)UL (ref 150–450)
POTASSIUM SERPL-SCNC: 4.1 MMOL/L (ref 3.5–5.1)
PROT SERPL-MCNC: 8 G/DL (ref 6.4–8.2)
RBC # BLD AUTO: 4.79 X10(6)UL
SODIUM SERPL-SCNC: 140 MMOL/L (ref 136–145)
WBC # BLD AUTO: 8.8 X10(3) UL (ref 4–11)

## 2022-04-13 PROCEDURE — 99214 OFFICE O/P EST MOD 30 MIN: CPT | Performed by: SURGERY

## 2022-04-13 PROCEDURE — 80053 COMPREHEN METABOLIC PANEL: CPT | Performed by: SURGERY

## 2022-04-13 PROCEDURE — 99211 OFF/OP EST MAY X REQ PHY/QHP: CPT

## 2022-04-13 PROCEDURE — 86304 IMMUNOASSAY TUMOR CA 125: CPT | Performed by: SURGERY

## 2022-04-13 PROCEDURE — 85025 COMPLETE CBC W/AUTO DIFF WBC: CPT | Performed by: SURGERY

## 2022-04-13 NOTE — PROGRESS NOTES
Outpatient Oncology Care Plan   Problem list:   knowledge deficit   Problems related to:   side effect of treatment   Interventions:   provided general teaching   Expected outcomes:   understands plan of care   Progress towards outcome: making progress     Education Record   Learner: Patient   Barriers / Limitations: None   Method: Discussion   Outcome: Shows understanding   Comments:  Patient here for follow-up. Remains on letrozole. Has not been taking fosamax. Denies any bone aches, fevers, pain, or nausea. Recently had CT scan performed. Here to discuss next steps.

## 2022-04-20 ENCOUNTER — OFFICE VISIT (OUTPATIENT)
Dept: INTERNAL MEDICINE CLINIC | Facility: CLINIC | Age: 72
End: 2022-04-20
Payer: MEDICARE

## 2022-04-20 VITALS
RESPIRATION RATE: 16 BRPM | DIASTOLIC BLOOD PRESSURE: 60 MMHG | WEIGHT: 184 LBS | BODY MASS INDEX: 30.66 KG/M2 | SYSTOLIC BLOOD PRESSURE: 110 MMHG | OXYGEN SATURATION: 97 % | TEMPERATURE: 99 F | HEART RATE: 87 BPM | HEIGHT: 65 IN

## 2022-04-20 DIAGNOSIS — Z01.818 PREOP EXAM FOR INTERNAL MEDICINE: Primary | ICD-10-CM

## 2022-04-20 DIAGNOSIS — C79.89 SECONDARY MALIGNANT NEOPLASM OF SOFT TISSUES OF ABDOMEN (HCC): ICD-10-CM

## 2022-04-20 DIAGNOSIS — Z86.711 HISTORY OF PULMONARY EMBOLISM: ICD-10-CM

## 2022-04-20 DIAGNOSIS — C78.6 PERITONEAL CARCINOMATOSIS (HCC): ICD-10-CM

## 2022-04-20 DIAGNOSIS — E78.2 MIXED HYPERLIPIDEMIA: ICD-10-CM

## 2022-04-20 DIAGNOSIS — M85.80 OSTEOPENIA, UNSPECIFIED LOCATION: ICD-10-CM

## 2022-04-20 PROBLEM — R03.0 ELEVATED BP WITHOUT DIAGNOSIS OF HYPERTENSION: Status: RESOLVED | Noted: 2021-07-12 | Resolved: 2022-04-20

## 2022-04-20 PROBLEM — R19.5 ABNORMAL FECES: Status: RESOLVED | Noted: 2021-11-22 | Resolved: 2022-04-20

## 2022-04-20 PROCEDURE — 93000 ELECTROCARDIOGRAM COMPLETE: CPT | Performed by: INTERNAL MEDICINE

## 2022-04-20 PROCEDURE — 99214 OFFICE O/P EST MOD 30 MIN: CPT | Performed by: INTERNAL MEDICINE

## 2022-05-14 ENCOUNTER — LAB ENCOUNTER (OUTPATIENT)
Dept: LAB | Age: 72
End: 2022-05-14
Attending: SURGERY
Payer: MEDICARE

## 2022-05-14 ENCOUNTER — LAB ENCOUNTER (OUTPATIENT)
Dept: LAB | Age: 72
DRG: 357 | End: 2022-05-14
Attending: SURGERY
Payer: MEDICARE

## 2022-05-14 ENCOUNTER — LABORATORY ENCOUNTER (OUTPATIENT)
Dept: LAB | Age: 72
End: 2022-05-14
Attending: SURGERY
Payer: MEDICARE

## 2022-05-14 ENCOUNTER — LABORATORY ENCOUNTER (OUTPATIENT)
Dept: LAB | Age: 72
DRG: 357 | End: 2022-05-14
Attending: SURGERY
Payer: MEDICARE

## 2022-05-14 DIAGNOSIS — Z20.822 ENCOUNTER FOR PREOPERATIVE SCREENING LABORATORY TESTING FOR COVID-19 VIRUS: ICD-10-CM

## 2022-05-14 DIAGNOSIS — C78.6 PERITONEAL CARCINOMATOSIS (HCC): ICD-10-CM

## 2022-05-14 DIAGNOSIS — Z01.812 ENCOUNTER FOR PREOPERATIVE SCREENING LABORATORY TESTING FOR COVID-19 VIRUS: ICD-10-CM

## 2022-05-14 LAB
ANTIBODY SCREEN: NEGATIVE
RH BLOOD TYPE: POSITIVE

## 2022-05-14 PROCEDURE — 86850 RBC ANTIBODY SCREEN: CPT

## 2022-05-14 PROCEDURE — 86900 BLOOD TYPING SEROLOGIC ABO: CPT

## 2022-05-14 PROCEDURE — 86901 BLOOD TYPING SEROLOGIC RH(D): CPT

## 2022-05-15 LAB — SARS-COV-2 RNA RESP QL NAA+PROBE: NOT DETECTED

## 2022-05-17 ENCOUNTER — ANESTHESIA (OUTPATIENT)
Dept: SURGERY | Facility: HOSPITAL | Age: 72
End: 2022-05-17
Payer: MEDICARE

## 2022-05-17 ENCOUNTER — HOSPITAL ENCOUNTER (INPATIENT)
Facility: HOSPITAL | Age: 72
LOS: 3 days | Discharge: HOME | End: 2022-05-20
Attending: SURGERY | Admitting: SURGERY
Payer: MEDICARE

## 2022-05-17 ENCOUNTER — ANESTHESIA EVENT (OUTPATIENT)
Dept: SURGERY | Facility: HOSPITAL | Age: 72
End: 2022-05-17
Payer: MEDICARE

## 2022-05-17 ENCOUNTER — HOSPITAL ENCOUNTER (INPATIENT)
Facility: HOSPITAL | Age: 72
LOS: 3 days | Discharge: HOME | DRG: 357 | End: 2022-05-20
Attending: SURGERY | Admitting: SURGERY
Payer: MEDICARE

## 2022-05-17 DIAGNOSIS — Z01.812 ENCOUNTER FOR PREOPERATIVE SCREENING LABORATORY TESTING FOR COVID-19 VIRUS: Primary | ICD-10-CM

## 2022-05-17 DIAGNOSIS — C79.89 SECONDARY MALIGNANT NEOPLASM OF SOFT TISSUES OF ABDOMEN (HCC): ICD-10-CM

## 2022-05-17 DIAGNOSIS — Z20.822 ENCOUNTER FOR PREOPERATIVE SCREENING LABORATORY TESTING FOR COVID-19 VIRUS: Primary | ICD-10-CM

## 2022-05-17 DIAGNOSIS — C79.9 METASTASIS FROM MALIGNANT NEOPLASM OF UTERUS (HCC): ICD-10-CM

## 2022-05-17 DIAGNOSIS — C78.6 PERITONEAL CARCINOMATOSIS (HCC): ICD-10-CM

## 2022-05-17 DIAGNOSIS — C55 METASTASIS FROM MALIGNANT NEOPLASM OF UTERUS (HCC): ICD-10-CM

## 2022-05-17 PROBLEM — Z11.52 ENCOUNTER FOR PREOPERATIVE SCREENING LABORATORY TESTING FOR COVID-19 VIRUS: Status: ACTIVE | Noted: 2022-05-17

## 2022-05-17 PROCEDURE — 99222 1ST HOSP IP/OBS MODERATE 55: CPT | Performed by: HOSPITALIST

## 2022-05-17 PROCEDURE — 0WBF0ZZ EXCISION OF ABDOMINAL WALL, OPEN APPROACH: ICD-10-PCS | Performed by: SURGERY

## 2022-05-17 PROCEDURE — P9045 ALBUMIN (HUMAN), 5%, 250 ML: HCPCS | Performed by: ANESTHESIOLOGY

## 2022-05-17 PROCEDURE — 0DBW0ZZ EXCISION OF PERITONEUM, OPEN APPROACH: ICD-10-PCS | Performed by: SURGERY

## 2022-05-17 PROCEDURE — 76942 ECHO GUIDE FOR BIOPSY: CPT | Performed by: ANESTHESIOLOGY

## 2022-05-17 PROCEDURE — 3E0M30Y INTRODUCTION OF HYPERTHERMIC ANTINEOPLASTIC INTO PERITONEAL CAVITY, PERCUTANEOUS APPROACH: ICD-10-PCS | Performed by: SURGERY

## 2022-05-17 RX ORDER — SODIUM CHLORIDE, SODIUM LACTATE, POTASSIUM CHLORIDE, CALCIUM CHLORIDE 600; 310; 30; 20 MG/100ML; MG/100ML; MG/100ML; MG/100ML
INJECTION, SOLUTION INTRAVENOUS CONTINUOUS
Status: DISCONTINUED | OUTPATIENT
Start: 2022-05-17 | End: 2022-05-18

## 2022-05-17 RX ORDER — SODIUM CHLORIDE 9 MG/ML
INJECTION, SOLUTION INTRAVENOUS CONTINUOUS PRN
Status: DISCONTINUED | OUTPATIENT
Start: 2022-05-17 | End: 2022-05-17 | Stop reason: SURG

## 2022-05-17 RX ORDER — HYDROMORPHONE HYDROCHLORIDE 1 MG/ML
0.6 INJECTION, SOLUTION INTRAMUSCULAR; INTRAVENOUS; SUBCUTANEOUS EVERY 5 MIN PRN
Status: DISCONTINUED | OUTPATIENT
Start: 2022-05-17 | End: 2022-05-17 | Stop reason: HOSPADM

## 2022-05-17 RX ORDER — ACETAMINOPHEN 500 MG
1000 TABLET ORAL EVERY 6 HOURS
Status: DISCONTINUED | OUTPATIENT
Start: 2022-05-17 | End: 2022-05-20

## 2022-05-17 RX ORDER — METRONIDAZOLE 500 MG/100ML
500 INJECTION, SOLUTION INTRAVENOUS ONCE
Status: COMPLETED | OUTPATIENT
Start: 2022-05-17 | End: 2022-05-17

## 2022-05-17 RX ORDER — OXYCODONE HYDROCHLORIDE 5 MG/1
5 TABLET ORAL EVERY 4 HOURS PRN
Status: DISCONTINUED | OUTPATIENT
Start: 2022-05-17 | End: 2022-05-20

## 2022-05-17 RX ORDER — SODIUM CHLORIDE 9 MG/ML
INJECTION, SOLUTION INTRAVENOUS CONTINUOUS
Status: DISCONTINUED | OUTPATIENT
Start: 2022-05-17 | End: 2022-05-19

## 2022-05-17 RX ORDER — ALBUMIN, HUMAN INJ 5% 5 %
SOLUTION INTRAVENOUS CONTINUOUS PRN
Status: DISCONTINUED | OUTPATIENT
Start: 2022-05-17 | End: 2022-05-17 | Stop reason: SURG

## 2022-05-17 RX ORDER — HEPARIN SODIUM 5000 [USP'U]/ML
5000 INJECTION, SOLUTION INTRAVENOUS; SUBCUTANEOUS ONCE
Status: COMPLETED | OUTPATIENT
Start: 2022-05-17 | End: 2022-05-17

## 2022-05-17 RX ORDER — ONDANSETRON 2 MG/ML
4 INJECTION INTRAMUSCULAR; INTRAVENOUS EVERY 6 HOURS PRN
Status: DISCONTINUED | OUTPATIENT
Start: 2022-05-17 | End: 2022-05-17 | Stop reason: HOSPADM

## 2022-05-17 RX ORDER — HYDROMORPHONE HYDROCHLORIDE 1 MG/ML
0.4 INJECTION, SOLUTION INTRAMUSCULAR; INTRAVENOUS; SUBCUTANEOUS EVERY 5 MIN PRN
Status: DISCONTINUED | OUTPATIENT
Start: 2022-05-17 | End: 2022-05-17 | Stop reason: HOSPADM

## 2022-05-17 RX ORDER — MANNITOL 250 MG/ML
INJECTION, SOLUTION INTRAVENOUS AS NEEDED
Status: DISCONTINUED | OUTPATIENT
Start: 2022-05-17 | End: 2022-05-17 | Stop reason: SURG

## 2022-05-17 RX ORDER — MAGNESIUM SULFATE HEPTAHYDRATE 500 MG/ML
INJECTION, SOLUTION INTRAMUSCULAR; INTRAVENOUS AS NEEDED
Status: DISCONTINUED | OUTPATIENT
Start: 2022-05-17 | End: 2022-05-17 | Stop reason: SURG

## 2022-05-17 RX ORDER — MEPERIDINE HYDROCHLORIDE 25 MG/ML
25 INJECTION INTRAMUSCULAR; INTRAVENOUS; SUBCUTANEOUS
Status: DISCONTINUED | OUTPATIENT
Start: 2022-05-17 | End: 2022-05-17 | Stop reason: HOSPADM

## 2022-05-17 RX ORDER — FAMOTIDINE 20 MG/1
20 TABLET, FILM COATED ORAL 2 TIMES DAILY
Status: DISCONTINUED | OUTPATIENT
Start: 2022-05-17 | End: 2022-05-20

## 2022-05-17 RX ORDER — ONDANSETRON 2 MG/ML
INJECTION INTRAMUSCULAR; INTRAVENOUS
Status: COMPLETED
Start: 2022-05-17 | End: 2022-05-17

## 2022-05-17 RX ORDER — ENOXAPARIN SODIUM 100 MG/ML
40 INJECTION SUBCUTANEOUS DAILY
Status: DISCONTINUED | OUTPATIENT
Start: 2022-05-18 | End: 2022-05-20

## 2022-05-17 RX ORDER — LIDOCAINE HYDROCHLORIDE ANHYDROUS AND DEXTROSE MONOHYDRATE .8; 5 G/100ML; G/100ML
INJECTION, SOLUTION INTRAVENOUS CONTINUOUS PRN
Status: DISCONTINUED | OUTPATIENT
Start: 2022-05-17 | End: 2022-05-17 | Stop reason: SURG

## 2022-05-17 RX ORDER — MIDAZOLAM HYDROCHLORIDE 1 MG/ML
1 INJECTION INTRAMUSCULAR; INTRAVENOUS EVERY 5 MIN PRN
Status: DISCONTINUED | OUTPATIENT
Start: 2022-05-17 | End: 2022-05-17 | Stop reason: HOSPADM

## 2022-05-17 RX ORDER — METOCLOPRAMIDE HYDROCHLORIDE 5 MG/ML
INJECTION INTRAMUSCULAR; INTRAVENOUS AS NEEDED
Status: DISCONTINUED | OUTPATIENT
Start: 2022-05-17 | End: 2022-05-17 | Stop reason: SURG

## 2022-05-17 RX ORDER — ACETAMINOPHEN 500 MG
1000 TABLET ORAL ONCE
Status: DISCONTINUED | OUTPATIENT
Start: 2022-05-17 | End: 2022-05-17 | Stop reason: HOSPADM

## 2022-05-17 RX ORDER — ROCURONIUM BROMIDE 10 MG/ML
INJECTION, SOLUTION INTRAVENOUS AS NEEDED
Status: DISCONTINUED | OUTPATIENT
Start: 2022-05-17 | End: 2022-05-17 | Stop reason: SURG

## 2022-05-17 RX ORDER — FAMOTIDINE 10 MG/ML
20 INJECTION, SOLUTION INTRAVENOUS 2 TIMES DAILY
Status: DISCONTINUED | OUTPATIENT
Start: 2022-05-17 | End: 2022-05-20

## 2022-05-17 RX ORDER — MIDAZOLAM HYDROCHLORIDE 1 MG/ML
INJECTION INTRAMUSCULAR; INTRAVENOUS AS NEEDED
Status: DISCONTINUED | OUTPATIENT
Start: 2022-05-17 | End: 2022-05-17 | Stop reason: SURG

## 2022-05-17 RX ORDER — CISPLATIN 1 MG/ML
100 INJECTION INTRAVENOUS ONCE
Status: DISCONTINUED | OUTPATIENT
Start: 2022-05-17 | End: 2022-05-17 | Stop reason: HOSPADM

## 2022-05-17 RX ORDER — HYDROMORPHONE HYDROCHLORIDE 1 MG/ML
0.2 INJECTION, SOLUTION INTRAMUSCULAR; INTRAVENOUS; SUBCUTANEOUS EVERY 5 MIN PRN
Status: DISCONTINUED | OUTPATIENT
Start: 2022-05-17 | End: 2022-05-17 | Stop reason: HOSPADM

## 2022-05-17 RX ORDER — ONDANSETRON 2 MG/ML
4 INJECTION INTRAMUSCULAR; INTRAVENOUS EVERY 6 HOURS PRN
Status: DISCONTINUED | OUTPATIENT
Start: 2022-05-17 | End: 2022-05-20

## 2022-05-17 RX ORDER — SODIUM CHLORIDE, SODIUM LACTATE, POTASSIUM CHLORIDE, CALCIUM CHLORIDE 600; 310; 30; 20 MG/100ML; MG/100ML; MG/100ML; MG/100ML
INJECTION, SOLUTION INTRAVENOUS CONTINUOUS
Status: DISCONTINUED | OUTPATIENT
Start: 2022-05-17 | End: 2022-05-17 | Stop reason: HOSPADM

## 2022-05-17 RX ORDER — LETROZOLE 2.5 MG/1
2.5 TABLET, FILM COATED ORAL DAILY
COMMUNITY
End: 2022-06-06

## 2022-05-17 RX ORDER — NALOXONE HYDROCHLORIDE 0.4 MG/ML
80 INJECTION, SOLUTION INTRAMUSCULAR; INTRAVENOUS; SUBCUTANEOUS AS NEEDED
Status: DISCONTINUED | OUTPATIENT
Start: 2022-05-17 | End: 2022-05-17 | Stop reason: HOSPADM

## 2022-05-17 RX ADMIN — MIDAZOLAM HYDROCHLORIDE 2 MG: 1 INJECTION INTRAMUSCULAR; INTRAVENOUS at 07:36:00

## 2022-05-17 RX ADMIN — LIDOCAINE HYDROCHLORIDE ANHYDROUS AND DEXTROSE MONOHYDRATE 1.37 MG/KG/HR: .8; 5 INJECTION, SOLUTION INTRAVENOUS at 07:55:00

## 2022-05-17 RX ADMIN — SODIUM CHLORIDE: 9 INJECTION, SOLUTION INTRAVENOUS at 11:45:00

## 2022-05-17 RX ADMIN — SODIUM CHLORIDE: 9 INJECTION, SOLUTION INTRAVENOUS at 07:45:00

## 2022-05-17 RX ADMIN — METRONIDAZOLE 500 MG: 500 INJECTION, SOLUTION INTRAVENOUS at 07:37:00

## 2022-05-17 RX ADMIN — MANNITOL 100 ML: 250 INJECTION, SOLUTION INTRAVENOUS at 09:50:00

## 2022-05-17 RX ADMIN — METOCLOPRAMIDE HYDROCHLORIDE 10 MG: 5 INJECTION INTRAMUSCULAR; INTRAVENOUS at 10:40:00

## 2022-05-17 RX ADMIN — MAGNESIUM SULFATE HEPTAHYDRATE 0.5 G: 500 INJECTION, SOLUTION INTRAMUSCULAR; INTRAVENOUS at 09:45:00

## 2022-05-17 RX ADMIN — ROCURONIUM BROMIDE 50 MG: 10 INJECTION, SOLUTION INTRAVENOUS at 08:15:00

## 2022-05-17 RX ADMIN — MAGNESIUM SULFATE HEPTAHYDRATE 0.5 G: 500 INJECTION, SOLUTION INTRAMUSCULAR; INTRAVENOUS at 09:15:00

## 2022-05-17 RX ADMIN — SODIUM CHLORIDE: 9 INJECTION, SOLUTION INTRAVENOUS at 09:20:00

## 2022-05-17 RX ADMIN — ALBUMIN, HUMAN INJ 5%: 5 SOLUTION INTRAVENOUS at 07:55:00

## 2022-05-17 RX ADMIN — ROCURONIUM BROMIDE 50 MG: 10 INJECTION, SOLUTION INTRAVENOUS at 07:39:00

## 2022-05-17 RX ADMIN — ROCURONIUM BROMIDE 25 MG: 10 INJECTION, SOLUTION INTRAVENOUS at 12:15:00

## 2022-05-17 RX ADMIN — MAGNESIUM SULFATE HEPTAHYDRATE 0.5 G: 500 INJECTION, SOLUTION INTRAMUSCULAR; INTRAVENOUS at 10:00:00

## 2022-05-17 RX ADMIN — MAGNESIUM SULFATE HEPTAHYDRATE 0.5 G: 500 INJECTION, SOLUTION INTRAMUSCULAR; INTRAVENOUS at 09:30:00

## 2022-05-17 NOTE — ANESTHESIA POSTPROCEDURE EVALUATION
[address] [place] [address] Patient Status:  Inpatient   Age/Gender 70year old female MRN [id number]   Location [facility] Attending Jo Kenny MD   Hosp Day # 0 PCP Zoe Dwyer DO       Anesthesia Post-op Note    Cytoreductive surgery to include resection of left abdominal wall tumor and omentectomy. Hyperthermic intraperitoneal chemotherapy with Cisplatin (100 mg/m2)    Procedure Summary     Date: 05/17/22 Room / Location: [facility] MAIN OR  / [facility] MAIN OR    Anesthesia Start: 5623 Anesthesia Stop: 6141    Procedure: Cytoreductive surgery to include resection of left abdominal wall tumor and omentectomy. 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 ([address]      Metastasis from malignant neoplasm of uterus ([address]      (Peritoneal carcinomatosis ([address] [C78.6])    Surgeons: Jo Kenny MD Anesthesiologist: Sugar Fletcher MD    Anesthesia Type: general ASA Status: 3          Anesthesia Type: general    Vitals Value Taken Time   /65 05/17/22 1332   Temp 97.6 05/17/22 1332   Pulse 89 05/17/22 1332   Resp 14 05/17/22 1332   SpO2 98 05/17/22 1332       Patient Location: PACU    Anesthesia Type: general    Airway Patency: extubated    Postop Pain Control: adequate    Mental Status: mildly sedated but able to meaningfully participate in the post-anesthesia evaluation    Nausea/Vomiting: none    Cardiopulmonary/Hydration status: stable euvolemic    Complications: no apparent anesthesia related complications    Postop vital signs: stable    Dental Exam: Unchanged from Preop    Patient to be discharged from PACU when criteria met.

## 2022-05-17 NOTE — BRIEF OP NOTE
Pre-Operative Diagnosis: Peritoneal carcinomatosis ([address] [C78.6]  Metastasis from malignant neoplasm of uterus ([address] [C79.9, C55]     Post-Operative Diagnosis: Peritoneal carcinomatosis ([address] [C78.6] Metastasis from malignant neoplasm of uterus ([address] [C79.9, C55]     Procedure Performed:   Cytoreductive surgery to include resection of left abdominal wall tumor and omentectomy.   Hyperthermic intraperitoneal chemotherapy with Cisplatin (100 mg/m2)    Surgeon(s) and Role:     Allan Hill MD - Primary    Assistant(s):  PA: ROE Abarca     Surgical Findings: as expected     Specimen: yes     Estimated Blood Loss: Blood Output: 25 mL    Ajay Ashley  5/17/2022  1:18 PM    DICTATION [id number]

## 2022-05-17 NOTE — ANESTHESIA PROCEDURE NOTES
Regional Block  Performed by: Jonathon Morgan MD  Authorized by: Jonathon Morgan MD       General Information and Staff    Start Time:  5/17/2022 1:10 PM  End Time:  5/17/2022 1:15 PM  Anesthesiologist:  Carmen Hedrick MD  Performed by: Anesthesiologist  Patient Location:  OR    Block Placement: Post Induction  Site Identification: real time ultrasound guided and image stored and retrievable    Block site/laterality marked before start: site marked  Reason for Block: at surgeon's request and post-op pain management    Preanesthetic Checklist: 2 patient identifers, IV checked, site marked, risks and benefits discussed, monitors and equipment checked, pre-op evaluation, timeout performed, anesthesia consent, sterile technique used, no prohibitive neurological deficits and no local skin infection at insertion site      Procedure Details    Patient Position:  Supine  Prep: ChloraPrep    Monitoring:  Cardiac monitor, continuous pulse ox and blood pressure cuff  Block Type:  TAP  Laterality:  Bilateral  Injection Technique:  Single-shot    Needle    Needle Type:  Short-bevel and echogenic  Needle Gauge:  20 G  Needle Length:  100 mm  Needle Localization:  Ultrasound guidance  Reason for Ultrasound Use: appropriate spread of the medication was noted in real time and no ultrasound evidence of intravascular and/or intraneural injection            Assessment    Injection Assessment:  Good spread noted, negative resistance, negative aspiration for heme, incremental injection and low pressure  Heart Rate Change: No    - Patient tolerated block procedure well without evidence of immediate block related complications.      Medications      Additional Comments    Medication:  Bupivacaine 0.25% 30mL

## 2022-05-17 NOTE — ANESTHESIA PROCEDURE NOTES
Arterial Line  Performed by: Kenisha Morgan MD  Authorized by:  Kenisha Morgan MD     General Information and Staff    Procedure Start:  5/17/2022 7:45 AM  Procedure End:  5/17/2022 7:50 AM  Anesthesiologist:  Tony Antonio MD  Performed By:  Anesthesiologist  Patient Location:  OR  Indication: continuous blood pressure monitoring and blood sampling needed    Site Identification: surface landmarks    Preanesthetic Checklist: 2 patient identifiers, IV checked, risks and benefits discussed, monitors and equipment checked, pre-op evaluation, timeout performed, anesthesia consent and sterile technique used    Procedure Details    Catheter Size:  20 G  Catheter Length:  1 and 3/4 inchCatheter Type:  Arrow  Seldinger Technique?: Yes    Laterality:  LeftSite:  Radial artery  Site Prep: chlorhexidine  Line Secured:  Wrist Brace, tape and Tegaderm    Assessment    Events: patient tolerated procedure well with no complications      Medications      Additional Comments

## 2022-05-17 NOTE — ANESTHESIA PROCEDURE NOTES
Airway  Date/Time: 5/17/2022 7:40 AM  Urgency: elective    Airway not difficult    General Information and Staff    Patient location during procedure: OR  Anesthesiologist: Emanuel Morgan MD  Performed: anesthesiologist     Indications and Patient Condition  Indications for airway management: anesthesia  Spontaneous Ventilation: absent  Sedation level: deep  Preoxygenated: yes  Patient position: sniffing  Mask difficulty assessment: 1 - vent by mask    Final Airway Details  Final airway type: endotracheal airway      Successful airway: ETT  Cuffed: yes   Successful intubation technique: direct laryngoscopy  Facilitating devices/methods: intubating stylet  Endotracheal tube insertion site: oral  Blade: Diana  Blade size: #3  ETT size (mm): 7.5    Cormack-Lehane Classification: grade IIB - view of arytenoids or posterior of glottis only  Placement verified by: chest auscultation and capnometry   Cuff volume (mL): 5  Measured from: teeth  ETT to teeth (cm): 25  Number of attempts at approach: 1  Ventilation between attempts: none  Number of other approaches attempted: 0

## 2022-05-18 LAB
ALBUMIN SERPL-MCNC: 2.7 G/DL (ref 3.4–5)
ALBUMIN/GLOB SERPL: 1 {RATIO} (ref 1–2)
ALP LIVER SERPL-CCNC: 48 U/L
ALT SERPL-CCNC: 13 U/L
ANION GAP SERPL CALC-SCNC: 5 MMOL/L (ref 0–18)
AST SERPL-CCNC: 14 U/L (ref 15–37)
BILIRUB SERPL-MCNC: 0.5 MG/DL (ref 0.1–2)
BUN BLD-MCNC: 9 MG/DL (ref 7–18)
CALCIUM BLD-MCNC: 8.6 MG/DL (ref 8.5–10.1)
CHLORIDE SERPL-SCNC: 112 MMOL/L (ref 98–112)
CO2 SERPL-SCNC: 24 MMOL/L (ref 21–32)
CREAT BLD-MCNC: 0.66 MG/DL
ERYTHROCYTE [DISTWIDTH] IN BLOOD BY AUTOMATED COUNT: 13 %
GLOBULIN PLAS-MCNC: 2.8 G/DL (ref 2.8–4.4)
GLUCOSE BLD-MCNC: 113 MG/DL (ref 70–99)
HCT VFR BLD AUTO: 32.6 %
HGB BLD-MCNC: 10.1 G/DL
MAGNESIUM SERPL-MCNC: 1.7 MG/DL (ref 1.6–2.6)
MCH RBC QN AUTO: 26.9 PG (ref 26–34)
MCHC RBC AUTO-ENTMCNC: 31 G/DL (ref 31–37)
MCV RBC AUTO: 86.9 FL
OSMOLALITY SERPL CALC.SUM OF ELEC: 291 MOSM/KG (ref 275–295)
PHOSPHATE SERPL-MCNC: 3.1 MG/DL (ref 2.5–4.9)
PLATELET # BLD AUTO: 198 10(3)UL (ref 150–450)
POTASSIUM SERPL-SCNC: 3.7 MMOL/L (ref 3.5–5.1)
PROT SERPL-MCNC: 5.5 G/DL (ref 6.4–8.2)
RBC # BLD AUTO: 3.75 X10(6)UL
SODIUM SERPL-SCNC: 141 MMOL/L (ref 136–145)
WBC # BLD AUTO: 13.4 X10(3) UL (ref 4–11)

## 2022-05-18 PROCEDURE — 99232 SBSQ HOSP IP/OBS MODERATE 35: CPT | Performed by: HOSPITALIST

## 2022-05-18 RX ORDER — MAGNESIUM SULFATE HEPTAHYDRATE 40 MG/ML
2 INJECTION, SOLUTION INTRAVENOUS ONCE
Status: COMPLETED | OUTPATIENT
Start: 2022-05-18 | End: 2022-05-18

## 2022-05-18 RX ORDER — POTASSIUM CHLORIDE 14.9 MG/ML
20 INJECTION INTRAVENOUS ONCE
Status: COMPLETED | OUTPATIENT
Start: 2022-05-18 | End: 2022-05-18

## 2022-05-18 NOTE — HOME CARE LIAISON
Received referral via [employer] for Home Health services. Spoke w/ patient and provided with list of [facility] providers from [facility], patient choice is [id number] reserved in [facility] and contact information placed on AVS.Financial interest disclosure provided to patient.  Notified Leia ROBLERO

## 2022-05-18 NOTE — PLAN OF CARE
NURSING ADMISSION NOTE      Patient admitted via Cart  Oriented to room. Safety precautions initiated. Bed in low position. Call light in reach. Received pt from PACU around 1600  Midline incision with surgical dressing intact. topifoam and binder in place  Tolerating small amt of clear liquids with no c/o nausea  Cox intact.  Draining clear, yellow urine  Pt reports pain controlled with tylenol, ketamine and PCA dilaudid

## 2022-05-18 NOTE — PLAN OF CARE
Patient A&O x4, lungs clear, pain in abdomin 2 of 10, PCA dilaudid and ketamine running, and scheduled tylenol. Patient rarely uses the dilaudid pump. Patient pain controlled through the night and she slept well. Patient urinated over a liter out through the night. Patient c/o hands and arms appear puffy/ non-pitting edema this morning. Text page sent to MD for update. Lower extremities appear normal to patient, no swelling at this time.

## 2022-05-18 NOTE — OPERATIVE REPORT
[address]    PATIENT'S NAME: Joey Chavez   ATTENDING PHYSICIAN: Jose Desai. Ralf Florence MD   OPERATING PHYSICIAN: Jose Desai. Ralf Florence MD   PATIENT ACCOUNT#:   [id number]    LOCATION:  [address] [place] [address]  MEDICAL RECORD #:   [id number]       YOB: 1950  ADMISSION DATE:       05/17/2022      OPERATION DATE:  05/17/2022    OPERATIVE REPORT      PREOPERATIVE DIAGNOSIS:  Metastatic endometrioid carcinoma. POSTOPERATIVE DIAGNOSIS:  Metastatic endometrioid carcinoma. PROCEDURE:    1. Cytoreductive surgery to include resection of abdominal tumor with en bloc partial resection of left lower abdominal wall. 2.      Omentectomy. 3.   Hyperthermic intraperitoneal chemotherapy with cisplatin (100 mg/sq m). ASSISTANT:  Renzo Garcia PA-C     ANESTHESIA:  General.    INDICATIONS:  The patient is a 79-year-old woman with metastatic endometrioid carcinoma who has been on endocrine therapy. She was noted to have increase in abdominal wall tumor and presents today for surgical management. The surgical treatment matter had been discussed with her including risks at length. FINDINGS:  An 6 cm left lower abdominal wall tumor with suspected additional omental metastasis. Peritoneal lesion size score as follows:  Region 0, 1; region 5, 3. Thus, peritoneal carcinomatosis index was 4. Completeness of cytoreduction score 0 achieved. EBL: 25 ml    ESTIMATED OPERATIVE TIME: About 4.5 hours. OPERATIVE TECHNIQUE:  The patient was brought to the operating room and was placed in supine position. She was given general anesthesia by the anesthesiology service. Sequential compression boots were placed. Patient received preoperative intravenous antibiotic prophylaxis. She was prepped and draped in normal sterile fashion. The low midline incision extending to the mid upper abdomen was made and deepened. The fascia was incised. The peritoneal cavity was entered. Minimal adhesions were taken down.   About a 6 cm tumor was noted within the left upper quadrant involving the peritoneum and abdominal wall. This was resected to include a portion of the abdominal wall fascia musculature together with the peritoneum, oriented, and sent to Pathology for permanent section evaluation. Further evaluation identified about a 2 mm deposit within the inferior aspect of the peritoneum. This was excised and sent to Pathology, which suggested the presence of atypical cells. The larger tumor resected from the abdominal wall was consistent with metastatic endometrioid carcinoma by frozen section analysis. Evaluation of the abdominal cavity revealed no evidence for any other sites of metastatic disease. At this point, inflow and outflow catheters were placed per routine and attached to a [employer] device. The abdomen was closed at the skin level using locking 0 silk sutures. A heated circuit and was established using 3 L of lactated Ringer's at a flow rate of 1.7 L/min, achieving inflow temperature of 42.0 degrees centigrade and outflow temperature of 41.0 degrees centigrade. Once target temperature was achieved, 100 mg per sq m of Cisplatin for a total of 191 mg was instilled within the circuit for 90 minutes. Prior to instilling cisplatin, sodium thiosulfate induction dose of 15.3 g/sq m was instilled. During the chemoperfusion time, we maintained adequate core temperature and urine output throughout. Once 90 minutes was completed, the patient was started on maintenance dose of sodium thiosulfate 15.3 g/sq m anticipated for 12 hours. The abdominal cavity was reentered. Catheters were dispensed with appropriately. Evaluation revealed no evidence for bleeding or injury. The fascia was then reapproximated using #1 PDS. The skin was stapled. Sterile dressings were then applied. The patient tolerated the procedure well without immediate complications.   The patient was extubated in the operating room and was sent in stable condition to recovery room. Counts were correct. I was present throughout the procedure. Dictated By Pilo Findparamjit Lorenz MD  d: 05/17/2022 15:05:29  t: 05/17/2022 20:40:31  Job [id number]  AQW/

## 2022-05-18 NOTE — PROGRESS NOTES
05/18/22 0220   Clinical Encounter Type   Visited With Patient   Routine Visit Introduction  ( explained the role of the  to the Pt)   Surgical Visit Post-op  (Pt had recently had surgery to remove a small mass for the lower stomach area)   Confucianist Encounters   Confucianist Needs Pastoral care brochure  ( provided attentive listening and non- anxious presence as the Pt shared her successful treatment despite a cancerous mass being removed from the lower stomach area)   Family Spiritual Encounters   Family Coping Accepting  (P t was accepting of her diagnosis and please with the doctor and medical team)   Family Participation in Care Consistently  (Pt has a supportive family that is consistently at her bedside)   Family Support During Treatment Consistently   Taxonomy   Intended Effects Aligning care plan with patient's values   Methods Demonstrate acceptance; Explore allen and values; Explore nature of God;Explore presence of God;Explore quality of life   Interventions Acknowledge current situation; Active listening; Ask guided questions; Ask guided questions about the nature and presence of God;Explain  role; Share words of hope and inspiration   During the visit the Pt was resting. She shared that she had just recently returned from surgery and was thankful for a successful outcome.  validated and praised her doctors for the successful removal  of a mass from the lower stomach area.  also affirmed God and how his spirit was displayed through the  awesome work performed  by the medical team. Pt declined a visit but was thankful for the [address] visit . Hospitality, compassionate listening and non- anxious presence was displayed during the visit. Pt was encouraged to call the On- call pager at [phone] or made a request through consult if another visit is needed.    pt was encouraged

## 2022-05-18 NOTE — CM/SW NOTE
Pt discussed in rounds this AM. SW received consult for [facility] RN order. Referral sent, [facility]NI[facility].     Julian 106, LSW

## 2022-05-19 LAB
ALBUMIN SERPL-MCNC: 2.6 G/DL (ref 3.4–5)
ALBUMIN/GLOB SERPL: 0.8 {RATIO} (ref 1–2)
ALP LIVER SERPL-CCNC: 46 U/L
ALT SERPL-CCNC: 13 U/L
ANION GAP SERPL CALC-SCNC: 1 MMOL/L (ref 0–18)
AST SERPL-CCNC: 14 U/L (ref 15–37)
BASOPHILS # BLD AUTO: 0.03 X10(3) UL (ref 0–0.2)
BASOPHILS NFR BLD AUTO: 0.3 %
BILIRUB SERPL-MCNC: 0.5 MG/DL (ref 0.1–2)
BLOOD TYPE BARCODE: 5100
BUN BLD-MCNC: 11 MG/DL (ref 7–18)
CALCIUM BLD-MCNC: 8.6 MG/DL (ref 8.5–10.1)
CHLORIDE SERPL-SCNC: 112 MMOL/L (ref 98–112)
CO2 SERPL-SCNC: 24 MMOL/L (ref 21–32)
CREAT BLD-MCNC: 0.72 MG/DL
EOSINOPHIL # BLD AUTO: 0.46 X10(3) UL (ref 0–0.7)
EOSINOPHIL NFR BLD AUTO: 4 %
ERYTHROCYTE [DISTWIDTH] IN BLOOD BY AUTOMATED COUNT: 13.2 %
GLOBULIN PLAS-MCNC: 3.1 G/DL (ref 2.8–4.4)
GLUCOSE BLD-MCNC: 105 MG/DL (ref 70–99)
HCT VFR BLD AUTO: 31.1 %
HGB BLD-MCNC: 9.8 G/DL
IMM GRANULOCYTES # BLD AUTO: 0.05 X10(3) UL (ref 0–1)
IMM GRANULOCYTES NFR BLD: 0.4 %
LYMPHOCYTES # BLD AUTO: 1.01 X10(3) UL (ref 1–4)
LYMPHOCYTES NFR BLD AUTO: 8.8 %
MAGNESIUM SERPL-MCNC: 1.8 MG/DL (ref 1.6–2.6)
MCH RBC QN AUTO: 27.2 PG (ref 26–34)
MCHC RBC AUTO-ENTMCNC: 31.5 G/DL (ref 31–37)
MCV RBC AUTO: 86.4 FL
MONOCYTES # BLD AUTO: 0.7 X10(3) UL (ref 0.1–1)
MONOCYTES NFR BLD AUTO: 6.1 %
NEUTROPHILS # BLD AUTO: 9.29 X10 (3) UL (ref 1.5–7.7)
NEUTROPHILS # BLD AUTO: 9.29 X10(3) UL (ref 1.5–7.7)
NEUTROPHILS NFR BLD AUTO: 80.4 %
NT-PROBNP SERPL-MCNC: 465 PG/ML (ref ?–125)
OSMOLALITY SERPL CALC.SUM OF ELEC: 284 MOSM/KG (ref 275–295)
PLATELET # BLD AUTO: 210 10(3)UL (ref 150–450)
POTASSIUM SERPL-SCNC: 4 MMOL/L (ref 3.5–5.1)
POTASSIUM SERPL-SCNC: 4 MMOL/L (ref 3.5–5.1)
PROT SERPL-MCNC: 5.7 G/DL (ref 6.4–8.2)
RBC # BLD AUTO: 3.6 X10(6)UL
SODIUM SERPL-SCNC: 137 MMOL/L (ref 136–145)
WBC # BLD AUTO: 11.5 X10(3) UL (ref 4–11)

## 2022-05-19 PROCEDURE — 99232 SBSQ HOSP IP/OBS MODERATE 35: CPT | Performed by: HOSPITALIST

## 2022-05-19 RX ORDER — ENOXAPARIN SODIUM 100 MG/ML
40 INJECTION SUBCUTANEOUS DAILY
Qty: 14 EACH | Refills: 0 | Status: SHIPPED | OUTPATIENT
Start: 2022-05-20 | End: 2022-07-11 | Stop reason: ALTCHOICE

## 2022-05-19 RX ORDER — ACETAMINOPHEN 500 MG
1000 TABLET ORAL EVERY 6 HOURS PRN
Qty: 30 TABLET | Refills: 0 | Status: SHIPPED | OUTPATIENT
Start: 2022-05-19

## 2022-05-19 RX ORDER — MAGNESIUM OXIDE 400 MG/1
400 TABLET ORAL ONCE
Status: COMPLETED | OUTPATIENT
Start: 2022-05-19 | End: 2022-05-19

## 2022-05-19 RX ORDER — FUROSEMIDE 10 MG/ML
20 INJECTION INTRAMUSCULAR; INTRAVENOUS ONCE
Status: COMPLETED | OUTPATIENT
Start: 2022-05-19 | End: 2022-05-19

## 2022-05-19 RX ORDER — OXYCODONE HYDROCHLORIDE 5 MG/1
5 TABLET ORAL EVERY 6 HOURS PRN
Qty: 20 TABLET | Refills: 0 | Status: SHIPPED | OUTPATIENT
Start: 2022-05-19 | End: 2022-07-11

## 2022-05-19 NOTE — PLAN OF CARE
Assumed care at 0730. A&Ox4. C/o hand edema, VSS. Pain controlled with Ketamine, tylenol and Oxy. Abdominal incision C/D/I. UO appropriate. Tolerating full liquid diet. Ambulated in stallings with PT. Patient resting comfortably in bed. Will continue to monitor.

## 2022-05-19 NOTE — PLAN OF CARE
Assumed care at [address].    A&Ox4, RA, NSR on tele  Ketamine dc'd  Up in bathroom x1 assist with adequate urine output  Prn oxy given for pain with adequate relief  Mid abdominal incision D/I   Call light within reach    Patient updated on plan of care

## 2022-05-19 NOTE — PLAN OF CARE
Pt AxO4  RA  NSR/ST  1x assist/standby with walker  -PT evaluated  Pain present; abdominal incision  -5/10 pain; oxy gives relief  Abdominal incision  -telfa/tegaderm/abdominal binder/staples  -C/D/I    -adequate urine output  GI  -bowel sounds -> hypoactive  -passing gas(-), belching(-)  Monitor BUE - hands/BLE edema  Monitor I/Os  Diet tolerated  Mg replaced  Plan of care updated with pt, possible dc tomorrow if medically cleared, all questions answered

## 2022-05-20 VITALS
OXYGEN SATURATION: 98 % | RESPIRATION RATE: 22 BRPM | SYSTOLIC BLOOD PRESSURE: 146 MMHG | DIASTOLIC BLOOD PRESSURE: 64 MMHG | TEMPERATURE: 99 F | WEIGHT: 184 LBS | HEIGHT: 65 IN | BODY MASS INDEX: 30.66 KG/M2 | HEART RATE: 108 BPM

## 2022-05-20 LAB
MAGNESIUM SERPL-MCNC: 1.9 MG/DL (ref 1.6–2.6)
POTASSIUM SERPL-SCNC: 3.7 MMOL/L (ref 3.5–5.1)

## 2022-05-20 PROCEDURE — 99231 SBSQ HOSP IP/OBS SF/LOW 25: CPT | Performed by: HOSPITALIST

## 2022-05-20 RX ORDER — POTASSIUM CHLORIDE 20 MEQ/1
40 TABLET, EXTENDED RELEASE ORAL ONCE
Status: COMPLETED | OUTPATIENT
Start: 2022-05-20 | End: 2022-05-20

## 2022-05-20 NOTE — PLAN OF CARE
NURSING DISCHARGE NOTE    Discharged Home via Wheelchair. Accompanied by Support staff  Belongings Returned to patient from safe. Pt AxO4  RA  NSR/ST  1x assist/standby with walker  Pain present; abdominal incision  -5/10 pain; oxy gives relief  Abdominal incision  -telfa/tegaderm/abdominal binder/staples   =open to air  -C/D/I    -adequate urine output  GI  -bowel sounds -> active  -passing gas(+), belching(+)  Monitor BUE - hands/BLE edema  -improved  Monitor I/Os  Diet tolerated  K replaced  Plan of care updated with pt, all questions answered    Pt discharged in stable condition to lobby via wheelchair. Pt informed and given discharge instructions, new medications, home medications, and f/u dates. Pt informed to follow discharge instructions post-surgical care. Pt informed to follow new medication regime. Pt given handouts for new medications. Pt verbally understands discharge instructions, all questions answered.     Problem: Patient/Family Goals  Goal: Patient/Family Long Term Goal  Description: Patient's Long Term Goal: discharge home    Interventions:  - follow medications ordered  - monitor I/Os  - encourage ambulation  - See additional Care Plan goals for specific interventions  Outcome: Adequate for Discharge  Goal: Patient/Family Short Term Goal  Description: Patient's Short Term Goal: adequate urine output    Interventions:   - follow medications as ordered  - encourage PO intake  - monitor s/s of urine retention  - See additional Care Plan goals for specific interventions  Outcome: Adequate for Discharge

## 2022-05-20 NOTE — PLAN OF CARE
Assumed care at [address].    A&Ox4, RA, NSR;ST on tele   Up in bathroom x1 assist and walker with adequate urine output    Abdominal incision open to air, D/I   Call light within reach    Patient updated on plan of care

## 2022-05-23 ENCOUNTER — TELEPHONE (OUTPATIENT)
Dept: SURGERY | Facility: CLINIC | Age: 72
End: 2022-05-23

## 2022-05-23 NOTE — TELEPHONE ENCOUNTER
Called to check on patient. Pt stated she is only taking tylenol once a day for minimal pain. Pt stated she has no fevers, nausea, or vomiting. Tolerating diet. Pt was having some diarrhea, but it has subsided and her stools are now formed. Confirmed post op appointment for Thursday at 11:30.

## 2022-05-26 ENCOUNTER — OFFICE VISIT (OUTPATIENT)
Dept: SURGERY | Facility: CLINIC | Age: 72
End: 2022-05-26
Payer: MEDICARE

## 2022-05-26 VITALS
WEIGHT: 182.38 LBS | SYSTOLIC BLOOD PRESSURE: 162 MMHG | RESPIRATION RATE: 16 BRPM | BODY MASS INDEX: 30 KG/M2 | HEART RATE: 101 BPM | DIASTOLIC BLOOD PRESSURE: 75 MMHG | OXYGEN SATURATION: 96 % | TEMPERATURE: 98 F

## 2022-05-26 DIAGNOSIS — C78.6 PERITONEAL CARCINOMATOSIS (HCC): ICD-10-CM

## 2022-05-26 DIAGNOSIS — Z98.890 POST-OPERATIVE STATE: Primary | ICD-10-CM

## 2022-05-26 LAB
ANION GAP SERPL CALC-SCNC: 6 MMOL/L (ref 0–18)
BUN BLD-MCNC: 8 MG/DL (ref 7–18)
CALCIUM BLD-MCNC: 9.4 MG/DL (ref 8.5–10.1)
CHLORIDE SERPL-SCNC: 107 MMOL/L (ref 98–112)
CO2 SERPL-SCNC: 29 MMOL/L (ref 21–32)
CREAT BLD-MCNC: 0.84 MG/DL
ERYTHROCYTE [DISTWIDTH] IN BLOOD BY AUTOMATED COUNT: 13.2 %
FASTING STATUS PATIENT QL REPORTED: NO
GLUCOSE BLD-MCNC: 105 MG/DL (ref 70–99)
HCT VFR BLD AUTO: 32.5 %
HGB BLD-MCNC: 10.5 G/DL
MCH RBC QN AUTO: 27.6 PG (ref 26–34)
MCHC RBC AUTO-ENTMCNC: 32.3 G/DL (ref 31–37)
MCV RBC AUTO: 85.3 FL
OSMOLALITY SERPL CALC.SUM OF ELEC: 293 MOSM/KG (ref 275–295)
PLATELET # BLD AUTO: 364 10(3)UL (ref 150–450)
POTASSIUM SERPL-SCNC: 3.8 MMOL/L (ref 3.5–5.1)
RBC # BLD AUTO: 3.81 X10(6)UL
SODIUM SERPL-SCNC: 142 MMOL/L (ref 136–145)
WBC # BLD AUTO: 9 X10(3) UL (ref 4–11)

## 2022-05-26 PROCEDURE — 1111F DSCHRG MED/CURRENT MED MERGE: CPT | Performed by: PHYSICIAN ASSISTANT

## 2022-05-26 PROCEDURE — 99024 POSTOP FOLLOW-UP VISIT: CPT | Performed by: PHYSICIAN ASSISTANT

## 2022-05-26 PROCEDURE — 85027 COMPLETE CBC AUTOMATED: CPT | Performed by: PHYSICIAN ASSISTANT

## 2022-05-26 PROCEDURE — 80048 BASIC METABOLIC PNL TOTAL CA: CPT | Performed by: PHYSICIAN ASSISTANT

## 2022-05-27 ENCOUNTER — TELEPHONE (OUTPATIENT)
Dept: INTERNAL MEDICINE CLINIC | Facility: CLINIC | Age: 72
End: 2022-05-27

## 2022-05-27 ENCOUNTER — TELEPHONE (OUTPATIENT)
Dept: HEMATOLOGY/ONCOLOGY | Facility: HOSPITAL | Age: 72
End: 2022-05-27

## 2022-05-27 NOTE — TELEPHONE ENCOUNTER
Ronny Perdue from [facility] calling. Pt is denying [facility] services. Patient met Varsha Carmichael (NP for Dr. Wilmar Lorenz) and agreed with pt decision. If needed, may return call at [phone].

## 2022-06-03 ENCOUNTER — OFFICE VISIT (OUTPATIENT)
Dept: SURGERY | Facility: CLINIC | Age: 72
End: 2022-06-03
Payer: MEDICARE

## 2022-06-03 VITALS
BODY MASS INDEX: 30 KG/M2 | SYSTOLIC BLOOD PRESSURE: 141 MMHG | TEMPERATURE: 99 F | HEART RATE: 98 BPM | OXYGEN SATURATION: 97 % | WEIGHT: 180.38 LBS | RESPIRATION RATE: 16 BRPM | DIASTOLIC BLOOD PRESSURE: 81 MMHG

## 2022-06-03 DIAGNOSIS — C78.6 PERITONEAL CARCINOMATOSIS (HCC): Primary | ICD-10-CM

## 2022-06-03 PROCEDURE — 1111F DSCHRG MED/CURRENT MED MERGE: CPT | Performed by: PHYSICIAN ASSISTANT

## 2022-06-03 PROCEDURE — 99024 POSTOP FOLLOW-UP VISIT: CPT | Performed by: PHYSICIAN ASSISTANT

## 2022-06-06 RX ORDER — LETROZOLE 2.5 MG/1
TABLET, FILM COATED ORAL
Qty: 90 TABLET | Refills: 0 | Status: SHIPPED | OUTPATIENT
Start: 2022-06-06 | End: 2022-09-02

## 2022-06-07 ENCOUNTER — OFFICE VISIT (OUTPATIENT)
Dept: ORTHOPEDICS CLINIC | Facility: CLINIC | Age: 72
End: 2022-06-07
Payer: MEDICARE

## 2022-06-07 VITALS — HEIGHT: 66 IN | BODY MASS INDEX: 28.93 KG/M2 | WEIGHT: 180 LBS

## 2022-06-07 DIAGNOSIS — S52.531A CLOSED COLLES' FRACTURE OF RIGHT RADIUS, INITIAL ENCOUNTER: Primary | ICD-10-CM

## 2022-06-07 PROCEDURE — 99212 OFFICE O/P EST SF 10 MIN: CPT | Performed by: ORTHOPAEDIC SURGERY

## 2022-06-07 PROCEDURE — 1125F AMNT PAIN NOTED PAIN PRSNT: CPT | Performed by: ORTHOPAEDIC SURGERY

## 2022-06-07 PROCEDURE — 1111F DSCHRG MED/CURRENT MED MERGE: CPT | Performed by: ORTHOPAEDIC SURGERY

## 2022-07-11 ENCOUNTER — OFFICE VISIT (OUTPATIENT)
Dept: INTERNAL MEDICINE CLINIC | Facility: CLINIC | Age: 72
End: 2022-07-11
Payer: MEDICARE

## 2022-07-11 VITALS
DIASTOLIC BLOOD PRESSURE: 72 MMHG | RESPIRATION RATE: 16 BRPM | HEART RATE: 85 BPM | WEIGHT: 183.38 LBS | HEIGHT: 64.5 IN | OXYGEN SATURATION: 99 % | SYSTOLIC BLOOD PRESSURE: 124 MMHG | TEMPERATURE: 99 F | BODY MASS INDEX: 30.93 KG/M2

## 2022-07-11 DIAGNOSIS — Z00.00 ROUTINE PHYSICAL EXAMINATION: Primary | ICD-10-CM

## 2022-07-11 DIAGNOSIS — Z12.11 SCREENING FOR COLON CANCER: ICD-10-CM

## 2022-07-11 DIAGNOSIS — R73.03 PREDIABETES: ICD-10-CM

## 2022-07-11 DIAGNOSIS — Z12.31 ENCOUNTER FOR SCREENING MAMMOGRAM FOR MALIGNANT NEOPLASM OF BREAST: ICD-10-CM

## 2022-07-11 DIAGNOSIS — M85.80 OSTEOPENIA, UNSPECIFIED LOCATION: ICD-10-CM

## 2022-07-11 DIAGNOSIS — C78.6 PERITONEAL CARCINOMATOSIS (HCC): ICD-10-CM

## 2022-07-11 DIAGNOSIS — Z86.711 HISTORY OF PULMONARY EMBOLISM: ICD-10-CM

## 2022-07-11 DIAGNOSIS — E78.2 MIXED HYPERLIPIDEMIA: ICD-10-CM

## 2022-07-11 DIAGNOSIS — E04.1 THYROID NODULE: ICD-10-CM

## 2022-07-11 NOTE — PATIENT INSTRUCTIONS
Please have the ultrasound done and blood test done  Continue to exercise at least 150 minutes a week and Eat a plant based diet    Please take 2000 IU of vitamin D daily  Schedule your colonoscopy  And mammogram

## 2022-07-14 ENCOUNTER — APPOINTMENT (OUTPATIENT)
Dept: HEMATOLOGY/ONCOLOGY | Facility: HOSPITAL | Age: 72
End: 2022-07-14
Attending: INTERNAL MEDICINE
Payer: MEDICARE

## 2022-07-19 ENCOUNTER — OFFICE VISIT (OUTPATIENT)
Dept: HEMATOLOGY/ONCOLOGY | Facility: HOSPITAL | Age: 72
End: 2022-07-19
Attending: INTERNAL MEDICINE
Payer: MEDICARE

## 2022-07-19 VITALS
DIASTOLIC BLOOD PRESSURE: 81 MMHG | HEART RATE: 94 BPM | HEIGHT: 65 IN | RESPIRATION RATE: 18 BRPM | TEMPERATURE: 98 F | WEIGHT: 180.63 LBS | OXYGEN SATURATION: 96 % | BODY MASS INDEX: 30.09 KG/M2 | SYSTOLIC BLOOD PRESSURE: 152 MMHG

## 2022-07-19 DIAGNOSIS — C55 METASTASIS FROM MALIGNANT NEOPLASM OF UTERUS (HCC): Primary | ICD-10-CM

## 2022-07-19 DIAGNOSIS — C79.9 METASTASIS FROM MALIGNANT NEOPLASM OF UTERUS (HCC): Primary | ICD-10-CM

## 2022-07-19 PROCEDURE — 84443 ASSAY THYROID STIM HORMONE: CPT | Performed by: INTERNAL MEDICINE

## 2022-07-19 PROCEDURE — 99214 OFFICE O/P EST MOD 30 MIN: CPT | Performed by: INTERNAL MEDICINE

## 2022-07-19 NOTE — PROGRESS NOTES
Outpatient Oncology Care Plan   Problem list:   fatigue   Problems related to:   self care   Interventions:   provided general teaching   Expected outcomes:   understands plan of care   Progress towards outcome: making progress     Education Record   Learner: Patient   Barriers / Limitations: None   Method: Discussion   Outcome: Shows understanding   Comments:  Patient here for follow-up. Remains on letrozole. Will have thyroid ultrasound next week. Denies any fevers, hot flashes, pain, nausea, joint pains, or abdominal issues at this time.

## 2022-07-25 ENCOUNTER — TELEPHONE (OUTPATIENT)
Dept: INTERNAL MEDICINE CLINIC | Facility: CLINIC | Age: 72
End: 2022-07-25

## 2022-07-25 ENCOUNTER — HOSPITAL ENCOUNTER (OUTPATIENT)
Dept: ULTRASOUND IMAGING | Age: 72
Discharge: HOME | End: 2022-07-25
Attending: INTERNAL MEDICINE
Payer: MEDICARE

## 2022-07-25 DIAGNOSIS — E04.1 THYROID NODULE: Primary | ICD-10-CM

## 2022-07-25 DIAGNOSIS — E04.1 THYROID NODULE: ICD-10-CM

## 2022-07-25 PROCEDURE — 76536 US EXAM OF HEAD AND NECK: CPT | Performed by: INTERNAL MEDICINE

## 2022-07-25 NOTE — TELEPHONE ENCOUNTER
Relayed thyroid ultrasound results to patient. Referral information provided. Patient verbalized understanding.

## 2022-07-25 NOTE — PROGRESS NOTES
Dear RN, please let the patient know   The ultrasound shows a large thyroid nodule.  I have placed a referral to our ENT surgeon Dr. Meme Williamson for further follow up  [address]

## 2022-08-16 ENCOUNTER — OFFICE VISIT (OUTPATIENT)
Facility: LOCATION | Age: 72
End: 2022-08-16
Payer: MEDICARE

## 2022-08-16 DIAGNOSIS — E04.2 MULTIPLE THYROID NODULES: Primary | ICD-10-CM

## 2022-08-16 PROCEDURE — 99203 OFFICE O/P NEW LOW 30 MIN: CPT | Performed by: OTOLARYNGOLOGY

## 2022-08-26 ENCOUNTER — LAB ENCOUNTER (OUTPATIENT)
Dept: LAB | Age: 72
End: 2022-08-26
Attending: OTOLARYNGOLOGY
Payer: MEDICARE

## 2022-08-26 DIAGNOSIS — E04.2 MULTIPLE THYROID NODULES: ICD-10-CM

## 2022-08-27 LAB — SARS-COV-2 RNA RESP QL NAA+PROBE: NOT DETECTED

## 2022-08-29 ENCOUNTER — HOSPITAL ENCOUNTER (OUTPATIENT)
Dept: ULTRASOUND IMAGING | Facility: HOSPITAL | Age: 72
Discharge: HOME | End: 2022-08-29
Attending: OTOLARYNGOLOGY
Payer: MEDICARE

## 2022-08-29 DIAGNOSIS — E04.2 MULTIPLE THYROID NODULES: ICD-10-CM

## 2022-08-29 PROCEDURE — 88173 CYTOPATH EVAL FNA REPORT: CPT | Performed by: OTOLARYNGOLOGY

## 2022-08-29 PROCEDURE — 10006 FNA BX W/US GDN EA ADDL: CPT | Performed by: OTOLARYNGOLOGY

## 2022-08-29 PROCEDURE — 10005 FNA BX W/US GDN 1ST LES: CPT | Performed by: OTOLARYNGOLOGY

## 2022-09-01 NOTE — PATIENT INSTRUCTIONS
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 · Inform your primary care physician of your surgery and ask if him/her will need to see you prior to surgery.   · If you d [Chaperone Present] : A chaperone was present in the examining room during all aspects of the physical examination [Appropriately responsive] : appropriately responsive [Alert] : alert [No Acute Distress] : no acute distress [Oriented x3] : oriented x3 [Labia Majora] : normal [Labia Minora] : normal [Normal] : normal

## 2022-09-02 RX ORDER — LETROZOLE 2.5 MG/1
TABLET, FILM COATED ORAL
Qty: 90 TABLET | Refills: 0 | Status: SHIPPED | OUTPATIENT
Start: 2022-09-02 | End: 2022-12-09

## 2022-09-13 ENCOUNTER — TELEPHONE (OUTPATIENT)
Facility: LOCATION | Age: 72
End: 2022-09-13

## 2022-09-13 NOTE — TELEPHONE ENCOUNTER
Results reviewed with patient. She does have a 4.8 cm nodule in the left. She will see me in 1 month for recheck. We will then discuss further treatment options.

## 2022-10-12 ENCOUNTER — OFFICE VISIT (OUTPATIENT)
Facility: LOCATION | Age: 72
End: 2022-10-12
Payer: MEDICARE

## 2022-10-12 DIAGNOSIS — E04.2 MULTIPLE THYROID NODULES: Primary | ICD-10-CM

## 2022-10-12 PROCEDURE — 99213 OFFICE O/P EST LOW 20 MIN: CPT | Performed by: OTOLARYNGOLOGY

## 2022-10-12 NOTE — PATIENT INSTRUCTIONS
Follow up with Dr. Iveth Nunez before surgery. She is an [employer]  Doctor. Adequate: hears normal conversation without difficulty

## 2022-10-12 NOTE — PROGRESS NOTES
Alisha Pitts is a 67year old female. Patient presents with:  Test Results: Thyroid u/s    HPI:   She has a history of thyroid nodules. She has undergone biopsy. REVIEW OF SYSTEMS:   GENERAL HEALTH: feels well otherwise  GENERAL : denies fever, chills, sweats, weight loss, weight gain  SKIN: denies any unusual skin lesions or rashes  RESPIRATORY: denies shortness of breath with exertion  NEURO: denies headaches    EXAM:   There were no vitals taken for this visit. System Findings Details   Constitutional  Overall appearance - Normal.   Psychiatric  Orientation - Oriented to time, place, person & situation. Appropriate mood and affect. Head/Face  Facial features -- Normal. Skull - Normal.   Eyes  Pupils equal ,round ,react to light and accomidate   Ears, Nose, Throat, Neck   ears clear nose clear oropharynx clear neck large thyroid nodule on the left   Neurological  Memory - Normal. Cranial nerves - Cranial nerves II through XII grossly intact. Lymph Detail  Submental. Submandibular. Anterior cervical. Posterior cervical. Supraclavicular. ASSESSMENT AND PLAN:   1. Multiple thyroid nodules  Ultrasound was reviewed with the patient which shows multiple thyroid nodules. The largest is 4.8 cm on the left. Recent biopsies of the left-sided thyroid nodule and a right-sided thyroid nodule are negative. Given the sheer size of the left-sided thyroid nodule I recommended surgery. She has numerous nodules on both sides and I recommended consideration of total thyroidectomy. We have discussed this procedure in detail. The risk benefits and alternatives were explained to the patient. The risks are to include but not limited to bleeding infection recurrent laryngeal nerve injury hoarseness hypoparathyroidism to include numbness tingling cramping or cardiac arrhythmias. The patient understands that they may need to be on thyroid medication for the rest of their life.     She is concerned as she recently lost 70 pounds. She is concerned about weight gain after surgery. Given the above, I recommended she see endocrinology prior to surgery to discuss her concerns. We will then schedule surgery. The patient indicates understanding of these issues and agrees to the plan. No follow-ups on file.     Darrell Diaz MD  10/12/2022  12:19 PM

## 2022-10-28 NOTE — PROGRESS NOTES
[facility] Visit    A/P: 67year old  female s/p open reduction internal fixation of right distal radius fracture on 12/15/2021 has functionally recovered. The numbness and tingling continues to improve and thus will observe. I did discuss with the patient that if her symptoms became more consistent/constant or more severe would recommend evaluation at which point time we will obtain an EMG/NCV. Weight Bearing Restrictions: No restrictions  Wound Care: no restrictions    Next Visit: As needed    HPI:  Patient has no functional limitations. She notes that the thumb and index finger numbness and tingling is improved since the last visit    No nausea/vomiting. No fevers/chills. Physical Exam:  General: Alert. Oriented x3. Appears comfortable. Right Upper Extremity:   Skin: incision healed. Edema: resolved post-op edema  Neuro: normal sensation in ulnar, and radial sensory nerve distribution distally. normal motor function of median, ulnar, AIN, and PIN inntervated muscles distally  Thumb numbness and index finger numbness that is improving. At this point its only the tip of the thumb and the radial aspect of the index finger distal to the DIP joint  Motion: Patient is able to make a symmetric composite fist.  Wrist extension is symmetric to the contralateral side and wrist flexion is about 15 degrees short of the contralateral side. Pronation near normal. Supination lacking 5-10 degrees        Jacklyn Weston MD  Hand, Wrist, & Elbow Surgery  [facility]  [facility] [email or web address]  [email or web address]  t: [id number]  f: [phone] Spironolactone Pregnancy And Lactation Text: This medication can cause feminization of the male fetus and should be avoided during pregnancy. The active metabolite is also found in breast milk.

## 2022-11-17 ENCOUNTER — OFFICE VISIT (OUTPATIENT)
Dept: ENDOCRINOLOGY CLINIC | Facility: CLINIC | Age: 72
End: 2022-11-17
Payer: MEDICARE

## 2022-11-17 VITALS
SYSTOLIC BLOOD PRESSURE: 146 MMHG | DIASTOLIC BLOOD PRESSURE: 72 MMHG | BODY MASS INDEX: 30 KG/M2 | WEIGHT: 182.19 LBS | HEART RATE: 76 BPM

## 2022-11-17 DIAGNOSIS — E04.2 MULTINODULAR GOITER (NONTOXIC): Primary | ICD-10-CM

## 2022-11-17 PROCEDURE — 99204 OFFICE O/P NEW MOD 45 MIN: CPT | Performed by: STUDENT IN AN ORGANIZED HEALTH CARE EDUCATION/TRAINING PROGRAM

## 2022-11-17 NOTE — PATIENT INSTRUCTIONS
Thyroid medication:    -levothyroxine (generic)  -Synthroid (brand)    Will start you at around ~100mcg (microgram)

## 2022-11-22 ENCOUNTER — TELEPHONE (OUTPATIENT)
Facility: LOCATION | Age: 72
End: 2022-11-22

## 2022-12-01 DIAGNOSIS — E04.2 NONTOXIC MULTINODULAR GOITER: Primary | ICD-10-CM

## 2022-12-13 ENCOUNTER — OFFICE VISIT (OUTPATIENT)
Dept: INTERNAL MEDICINE CLINIC | Facility: CLINIC | Age: 72
End: 2022-12-13
Payer: MEDICARE

## 2022-12-13 VITALS
BODY MASS INDEX: 29.99 KG/M2 | TEMPERATURE: 99 F | RESPIRATION RATE: 16 BRPM | WEIGHT: 180 LBS | HEIGHT: 65 IN | HEART RATE: 133 BPM | DIASTOLIC BLOOD PRESSURE: 70 MMHG | SYSTOLIC BLOOD PRESSURE: 137 MMHG | OXYGEN SATURATION: 99 %

## 2022-12-13 DIAGNOSIS — M79.605 PAIN IN POSTERIOR LEFT LOWER EXTREMITY: Primary | ICD-10-CM

## 2022-12-13 PROCEDURE — 1125F AMNT PAIN NOTED PAIN PRSNT: CPT | Performed by: PHYSICIAN ASSISTANT

## 2022-12-13 PROCEDURE — 99213 OFFICE O/P EST LOW 20 MIN: CPT | Performed by: PHYSICIAN ASSISTANT

## 2022-12-13 RX ORDER — DIAZEPAM 5 MG/1
TABLET ORAL
Qty: 3 TABLET | Refills: 0 | Status: SHIPPED | OUTPATIENT
Start: 2022-12-13 | End: 2022-12-13 | Stop reason: CLARIF

## 2022-12-13 NOTE — PATIENT INSTRUCTIONS
Start Motrin 600 mg twice a day for the next three days (with food) to reduce inflammation. *Motrin, Advil, and ibuprofen are all the same thing. Ice/heat - 10-15 minutes at a time. Home exercises. Notify Je Winston if pain does not continue to improve.

## 2023-01-16 ENCOUNTER — LAB ENCOUNTER (OUTPATIENT)
Dept: LAB | Age: 73
End: 2023-01-16
Attending: OTOLARYNGOLOGY
Payer: MEDICARE

## 2023-01-16 DIAGNOSIS — E04.2 NONTOXIC MULTINODULAR GOITER: ICD-10-CM

## 2023-01-17 LAB — SARS-COV-2 RNA RESP QL NAA+PROBE: NOT DETECTED

## 2023-01-19 ENCOUNTER — ANESTHESIA (OUTPATIENT)
Dept: SURGERY | Facility: HOSPITAL | Age: 73
End: 2023-01-19
Payer: MEDICARE

## 2023-01-19 ENCOUNTER — HOSPITAL ENCOUNTER (OUTPATIENT)
Facility: HOSPITAL | Age: 73
Discharge: HOME | End: 2023-01-20
Attending: OTOLARYNGOLOGY | Admitting: OTOLARYNGOLOGY
Payer: MEDICARE

## 2023-01-19 ENCOUNTER — ANESTHESIA EVENT (OUTPATIENT)
Dept: SURGERY | Facility: HOSPITAL | Age: 73
End: 2023-01-19
Payer: MEDICARE

## 2023-01-19 DIAGNOSIS — E04.2 NONTOXIC MULTINODULAR GOITER: Primary | ICD-10-CM

## 2023-01-19 PROCEDURE — 0GTK0ZZ RESECTION OF THYROID GLAND, OPEN APPROACH: ICD-10-PCS | Performed by: OTOLARYNGOLOGY

## 2023-01-19 PROCEDURE — 60271 REMOVAL OF THYROID: CPT | Performed by: OTOLARYNGOLOGY

## 2023-01-19 RX ORDER — OXYCODONE HYDROCHLORIDE 5 MG/1
5 TABLET ORAL EVERY 4 HOURS PRN
Status: DISCONTINUED | OUTPATIENT
Start: 2023-01-19 | End: 2023-01-20

## 2023-01-19 RX ORDER — ACETAMINOPHEN 325 MG/1
650 TABLET ORAL
Status: DISCONTINUED | OUTPATIENT
Start: 2023-01-19 | End: 2023-01-20

## 2023-01-19 RX ORDER — METOCLOPRAMIDE HYDROCHLORIDE 5 MG/ML
10 INJECTION INTRAMUSCULAR; INTRAVENOUS EVERY 8 HOURS PRN
Status: DISCONTINUED | OUTPATIENT
Start: 2023-01-19 | End: 2023-01-19 | Stop reason: HOSPADM

## 2023-01-19 RX ORDER — LIDOCAINE HYDROCHLORIDE 10 MG/ML
INJECTION, SOLUTION EPIDURAL; INFILTRATION; INTRACAUDAL; PERINEURAL AS NEEDED
Status: DISCONTINUED | OUTPATIENT
Start: 2023-01-19 | End: 2023-01-19 | Stop reason: SURG

## 2023-01-19 RX ORDER — LEVOTHYROXINE SODIUM 0.1 MG/1
100 TABLET ORAL
Status: DISCONTINUED | OUTPATIENT
Start: 2023-01-20 | End: 2023-01-20

## 2023-01-19 RX ORDER — DEXAMETHASONE SODIUM PHOSPHATE 4 MG/ML
VIAL (ML) INJECTION AS NEEDED
Status: DISCONTINUED | OUTPATIENT
Start: 2023-01-19 | End: 2023-01-19 | Stop reason: SURG

## 2023-01-19 RX ORDER — CALCITRIOL 0.25 UG/1
CAPSULE, LIQUID FILLED ORAL
Status: COMPLETED
Start: 2023-01-19 | End: 2023-01-19

## 2023-01-19 RX ORDER — HYDROCODONE BITARTRATE AND ACETAMINOPHEN 5; 325 MG/1; MG/1
2 TABLET ORAL ONCE AS NEEDED
Status: DISCONTINUED | OUTPATIENT
Start: 2023-01-19 | End: 2023-01-19 | Stop reason: HOSPADM

## 2023-01-19 RX ORDER — ONDANSETRON 2 MG/ML
4 INJECTION INTRAMUSCULAR; INTRAVENOUS EVERY 6 HOURS PRN
Status: DISCONTINUED | OUTPATIENT
Start: 2023-01-19 | End: 2023-01-19 | Stop reason: HOSPADM

## 2023-01-19 RX ORDER — OXYCODONE HYDROCHLORIDE 5 MG/1
2.5 TABLET ORAL EVERY 4 HOURS PRN
Status: DISCONTINUED | OUTPATIENT
Start: 2023-01-19 | End: 2023-01-20

## 2023-01-19 RX ORDER — SODIUM CHLORIDE, SODIUM LACTATE, POTASSIUM CHLORIDE, CALCIUM CHLORIDE 600; 310; 30; 20 MG/100ML; MG/100ML; MG/100ML; MG/100ML
INJECTION, SOLUTION INTRAVENOUS CONTINUOUS
Status: DISCONTINUED | OUTPATIENT
Start: 2023-01-19 | End: 2023-01-19

## 2023-01-19 RX ORDER — SODIUM CHLORIDE, SODIUM LACTATE, POTASSIUM CHLORIDE, CALCIUM CHLORIDE 600; 310; 30; 20 MG/100ML; MG/100ML; MG/100ML; MG/100ML
INJECTION, SOLUTION INTRAVENOUS CONTINUOUS
Status: DISCONTINUED | OUTPATIENT
Start: 2023-01-19 | End: 2023-01-19 | Stop reason: HOSPADM

## 2023-01-19 RX ORDER — MEPERIDINE HYDROCHLORIDE 25 MG/ML
12.5 INJECTION INTRAMUSCULAR; INTRAVENOUS; SUBCUTANEOUS AS NEEDED
Status: DISCONTINUED | OUTPATIENT
Start: 2023-01-19 | End: 2023-01-19 | Stop reason: HOSPADM

## 2023-01-19 RX ORDER — ONDANSETRON 2 MG/ML
INJECTION INTRAMUSCULAR; INTRAVENOUS AS NEEDED
Status: DISCONTINUED | OUTPATIENT
Start: 2023-01-19 | End: 2023-01-19 | Stop reason: SURG

## 2023-01-19 RX ORDER — ONDANSETRON 2 MG/ML
4 INJECTION INTRAMUSCULAR; INTRAVENOUS EVERY 6 HOURS PRN
Status: DISCONTINUED | OUTPATIENT
Start: 2023-01-19 | End: 2023-01-20

## 2023-01-19 RX ORDER — DIPHENHYDRAMINE HYDROCHLORIDE 50 MG/ML
12.5 INJECTION INTRAMUSCULAR; INTRAVENOUS AS NEEDED
Status: DISCONTINUED | OUTPATIENT
Start: 2023-01-19 | End: 2023-01-19 | Stop reason: HOSPADM

## 2023-01-19 RX ORDER — MIDAZOLAM HYDROCHLORIDE 1 MG/ML
1 INJECTION INTRAMUSCULAR; INTRAVENOUS EVERY 5 MIN PRN
Status: DISCONTINUED | OUTPATIENT
Start: 2023-01-19 | End: 2023-01-19 | Stop reason: HOSPADM

## 2023-01-19 RX ORDER — HYDROMORPHONE HYDROCHLORIDE 1 MG/ML
0.6 INJECTION, SOLUTION INTRAMUSCULAR; INTRAVENOUS; SUBCUTANEOUS EVERY 5 MIN PRN
Status: DISCONTINUED | OUTPATIENT
Start: 2023-01-19 | End: 2023-01-19 | Stop reason: HOSPADM

## 2023-01-19 RX ORDER — CALCITRIOL 0.25 UG/1
0.25 CAPSULE, LIQUID FILLED ORAL DAILY
Status: DISCONTINUED | OUTPATIENT
Start: 2023-01-19 | End: 2023-01-20

## 2023-01-19 RX ORDER — CALCITRIOL 0.25 UG/1
0.25 CAPSULE, LIQUID FILLED ORAL ONCE
Status: COMPLETED | OUTPATIENT
Start: 2023-01-19 | End: 2023-01-19

## 2023-01-19 RX ORDER — CALCIUM CARBONATE 500(1250)
1000 TABLET ORAL
Status: DISCONTINUED | OUTPATIENT
Start: 2023-01-19 | End: 2023-01-20

## 2023-01-19 RX ORDER — ROCURONIUM BROMIDE 10 MG/ML
INJECTION, SOLUTION INTRAVENOUS AS NEEDED
Status: DISCONTINUED | OUTPATIENT
Start: 2023-01-19 | End: 2023-01-19 | Stop reason: SURG

## 2023-01-19 RX ORDER — NALOXONE HYDROCHLORIDE 0.4 MG/ML
80 INJECTION, SOLUTION INTRAMUSCULAR; INTRAVENOUS; SUBCUTANEOUS AS NEEDED
Status: DISCONTINUED | OUTPATIENT
Start: 2023-01-19 | End: 2023-01-19 | Stop reason: HOSPADM

## 2023-01-19 RX ORDER — HYDROMORPHONE HYDROCHLORIDE 1 MG/ML
0.2 INJECTION, SOLUTION INTRAMUSCULAR; INTRAVENOUS; SUBCUTANEOUS EVERY 5 MIN PRN
Status: DISCONTINUED | OUTPATIENT
Start: 2023-01-19 | End: 2023-01-19 | Stop reason: HOSPADM

## 2023-01-19 RX ORDER — ACETAMINOPHEN 500 MG
1000 TABLET ORAL ONCE
Status: DISCONTINUED | OUTPATIENT
Start: 2023-01-19 | End: 2023-01-19 | Stop reason: HOSPADM

## 2023-01-19 RX ORDER — LIDOCAINE HYDROCHLORIDE AND EPINEPHRINE 10; 10 MG/ML; UG/ML
INJECTION, SOLUTION INFILTRATION; PERINEURAL AS NEEDED
Status: DISCONTINUED | OUTPATIENT
Start: 2023-01-19 | End: 2023-01-19 | Stop reason: HOSPADM

## 2023-01-19 RX ORDER — HYDROCODONE BITARTRATE AND ACETAMINOPHEN 5; 325 MG/1; MG/1
1 TABLET ORAL ONCE AS NEEDED
Status: DISCONTINUED | OUTPATIENT
Start: 2023-01-19 | End: 2023-01-19 | Stop reason: HOSPADM

## 2023-01-19 RX ORDER — EPHEDRINE SULFATE 50 MG/ML
INJECTION INTRAVENOUS AS NEEDED
Status: DISCONTINUED | OUTPATIENT
Start: 2023-01-19 | End: 2023-01-19 | Stop reason: SURG

## 2023-01-19 RX ORDER — HYDROMORPHONE HYDROCHLORIDE 1 MG/ML
0.2 INJECTION, SOLUTION INTRAMUSCULAR; INTRAVENOUS; SUBCUTANEOUS EVERY 2 HOUR PRN
Status: DISCONTINUED | OUTPATIENT
Start: 2023-01-19 | End: 2023-01-20

## 2023-01-19 RX ORDER — PHENYLEPHRINE HCL 10 MG/ML
VIAL (ML) INJECTION AS NEEDED
Status: DISCONTINUED | OUTPATIENT
Start: 2023-01-19 | End: 2023-01-19 | Stop reason: SURG

## 2023-01-19 RX ORDER — HYDROMORPHONE HYDROCHLORIDE 1 MG/ML
0.4 INJECTION, SOLUTION INTRAMUSCULAR; INTRAVENOUS; SUBCUTANEOUS EVERY 5 MIN PRN
Status: DISCONTINUED | OUTPATIENT
Start: 2023-01-19 | End: 2023-01-19 | Stop reason: HOSPADM

## 2023-01-19 RX ORDER — LETROZOLE 2.5 MG/1
2.5 TABLET, FILM COATED ORAL DAILY
Status: DISCONTINUED | OUTPATIENT
Start: 2023-01-19 | End: 2023-01-20

## 2023-01-19 RX ORDER — HYDROMORPHONE HYDROCHLORIDE 1 MG/ML
0.4 INJECTION, SOLUTION INTRAMUSCULAR; INTRAVENOUS; SUBCUTANEOUS EVERY 2 HOUR PRN
Status: DISCONTINUED | OUTPATIENT
Start: 2023-01-19 | End: 2023-01-20

## 2023-01-19 RX ORDER — LETROZOLE 2.5 MG/1
2.5 TABLET, FILM COATED ORAL DAILY
COMMUNITY

## 2023-01-19 RX ORDER — SODIUM CHLORIDE AND POTASSIUM CHLORIDE 150; 900 MG/100ML; MG/100ML
INJECTION, SOLUTION INTRAVENOUS CONTINUOUS
Status: DISCONTINUED | OUTPATIENT
Start: 2023-01-19 | End: 2023-01-20

## 2023-01-19 RX ORDER — MIDAZOLAM HYDROCHLORIDE 1 MG/ML
INJECTION INTRAMUSCULAR; INTRAVENOUS AS NEEDED
Status: DISCONTINUED | OUTPATIENT
Start: 2023-01-19 | End: 2023-01-19 | Stop reason: SURG

## 2023-01-19 RX ORDER — ACETAMINOPHEN 500 MG
1000 TABLET ORAL ONCE AS NEEDED
Status: DISCONTINUED | OUTPATIENT
Start: 2023-01-19 | End: 2023-01-19 | Stop reason: HOSPADM

## 2023-01-19 RX ADMIN — SODIUM CHLORIDE, SODIUM LACTATE, POTASSIUM CHLORIDE, CALCIUM CHLORIDE: 600; 310; 30; 20 INJECTION, SOLUTION INTRAVENOUS at 09:23:00

## 2023-01-19 RX ADMIN — EPHEDRINE SULFATE 5 MG: 50 INJECTION INTRAVENOUS at 09:44:00

## 2023-01-19 RX ADMIN — PHENYLEPHRINE HCL 100 MCG: 10 MG/ML VIAL (ML) INJECTION at 09:56:00

## 2023-01-19 RX ADMIN — MIDAZOLAM HYDROCHLORIDE 2 MG: 1 INJECTION INTRAMUSCULAR; INTRAVENOUS at 09:23:00

## 2023-01-19 RX ADMIN — LIDOCAINE HYDROCHLORIDE 50 MG: 10 INJECTION, SOLUTION EPIDURAL; INFILTRATION; INTRACAUDAL; PERINEURAL at 09:30:00

## 2023-01-19 RX ADMIN — SODIUM CHLORIDE, SODIUM LACTATE, POTASSIUM CHLORIDE, CALCIUM CHLORIDE: 600; 310; 30; 20 INJECTION, SOLUTION INTRAVENOUS at 11:23:00

## 2023-01-19 RX ADMIN — PHENYLEPHRINE HCL 100 MCG: 10 MG/ML VIAL (ML) INJECTION at 09:42:00

## 2023-01-19 RX ADMIN — ONDANSETRON 4 MG: 2 INJECTION INTRAMUSCULAR; INTRAVENOUS at 11:00:00

## 2023-01-19 RX ADMIN — ROCURONIUM BROMIDE 6 MG: 10 INJECTION, SOLUTION INTRAVENOUS at 09:30:00

## 2023-01-19 RX ADMIN — DEXAMETHASONE SODIUM PHOSPHATE 8 MG: 4 MG/ML VIAL (ML) INJECTION at 09:36:00

## 2023-01-19 NOTE — BRIEF OP NOTE
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

## 2023-01-19 NOTE — ANESTHESIA PROCEDURE NOTES
Airway  Date/Time: 1/19/2023 9:31 AM  Urgency: elective    Airway not difficult    General Information and Staff    Patient location during procedure: OR  Anesthesiologist: Shahbaz Monroe MD  Resident/CRNA: Isabella Tate CRNA  Performed: CRNA     Indications and Patient Condition  Indications for airway management: anesthesia  Sedation level: deep  Preoxygenated: yes  Patient position: sniffing  Mask difficulty assessment: 1 - vent by mask    Final Airway Details  Final airway type: endotracheal airway      Successful airway: ETT and NIM tube  Cuffed: yes   Successful intubation technique: Video laryngoscopy  Endotracheal tube insertion site: oral  Blade: GlideScope  Blade size: #3  ETT size (mm): 7.0    Placement verified by: chest auscultation and capnometry   Measured from: lips  ETT to lips (cm): 22  Number of attempts at approach: 1    Additional Comments  Glide Scope #3 used for NIM tube placement

## 2023-01-19 NOTE — INTERVAL H&P NOTE
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 I discussed with the patient and/or legal representative the potential benefits, risks and side effects of this procedure; the likelihood of the patient achieving goals; and potential problems that might occur during recuperation. I discussed reasonable alternatives to the procedure, including risks, benefits and side effects related to the alternatives and risks related to not receiving this procedure. We will proceed with procedure as planned.

## 2023-01-19 NOTE — OPERATIVE REPORT
[facility]    PATIENT'S NAME: Jorge Luis Lobo   ATTENDING PHYSICIAN: Shiva Morgan M.D. OPERATING PHYSICIAN: Shiva Morgan M.D. PATIENT ACCOUNT#:   [de-identified]    LOCATION:  [address] [place] [address]  MEDICAL RECORD #:   [id number]       YOB: 1950  ADMISSION DATE:       01/19/2023      OPERATION DATE:  01/19/2023    OPERATIVE REPORT      PREOPERATIVE DIAGNOSIS:  Multinodular thyroid gland with substernal extension. POSTOPERATIVE DIAGNOSIS:  Multinodular thyroid gland with substernal extension. PROCEDURE:  Total thyroidectomy with substernal dissection, cervical approach. ASSISTANT SURGEON:  Linda Selby MD      ANESTHESIA:  General endotracheal.    OPERATIVE TECHNIQUE:  After satisfactory general endotracheal anesthesia induction, the patient was prepared for the procedure. A shoulder roll was placed. Lidocaine 1% with epinephrine was injected at the anterior neck. The area was then prepped and draped in the usual sterile fashion. The recurrent laryngeal nerve was monitored throughout the case. A #15 blade was used to make an incision at the anterior neck. This was carried down through the platysma muscle. Subplatysmal flaps were raised both superiorly and inferiorly. A self-retaining retractor was placed. Dissection was carried out in the midline between the strap musculature. The strap muscles were carefully elevated off the left lobe of the thyroid gland. The patient did have a very large nodule almost completely replacing the left lobe of the thyroid. This nodule also extended under the clavicle substernally. The middle thyroid vein was identified, clamped, and tied off with silk ties. Inferiorly, there were numerous vessels coming into the thyroid gland over the trachea, which were clamped and tied off with silk ties. We turned our attention superiorly.   There were numerous superior pole vessels coming into the thyroid, which were clamped and tied off with silk ties. Smaller vessels around the thyroid were sealed with the Harmonic Scalpel. We turned our attention laterally. The recurrent laryngeal nerve was identified. It was stimulated and kept intact throughout the case. The parathyroid glands were carefully teased free of the thyroid capsule, taking care to keep their blood supply intact. The left thyroid lobe, along with a large nodule, were pulled from the substernal space and then rolled from lateral to medial coming across [facility]. Attention was turned to the right-hand side and the same procedure performed. Once again, the recurrent laryngeal nerve and the parathyroid glands were identified. They were kept intact along with their blood supply throughout the case. In a similar fashion, a right thyroid lobectomy was performed as was done on the left-hand side, and thereby, a total thyroidectomy was performed. The thyroid gland was inspected, marked, and then sent to Pathology for analysis. The wound was copiously irrigated out with saline. There were no further signs of bleeding. Each nerve was stimulated at the end of the case and noted to be intact. The parathyroids were of good color. Nicole was placed in the wound. The strap muscles were closed with 3-0 Vicryl suture. A 3-0 Vicryl suture was used to close the deep layers, then a running subcuticular 4-0 Prolene stitch was placed along with Steri-Strips and a sterile dressing. The patient was awakened, extubated, and transferred to the recovery room in stable condition.     Dictated By Isa Gay M.D.  d: 01/19/2023 11:26:14  t: 01/19/2023 16:37:35  [facility] [id number]  /

## 2023-01-19 NOTE — PLAN OF CARE
Alert & oriented x4. VSS on room air. Voids. Tolerating regular diet. Ambulates with standby assistance. IVF infusing per MAR. Dressing on anterior neck with gauze and tegaderm C/D/I. Denies chest pain, SOB, N/V. Patient updated on plan of care. Questions and concerns addressed.      Problem: Patient/Family Goals  Goal: Patient/Family Long Term Goal  Description: Patient's Long Term Goal: Discharge home    Interventions:  - pain control with prn/scheduled meds  - morning labs  - See additional Care Plan goals for specific interventions  Outcome: Progressing  Goal: Patient/Family Short Term Goal  Description: Patient's Short Term Goal: Pain control    Interventions:   - ice  - prn meds  - See additional Care Plan goals for specific interventions  Outcome: Progressing

## 2023-01-20 VITALS
RESPIRATION RATE: 16 BRPM | BODY MASS INDEX: 30.49 KG/M2 | TEMPERATURE: 98 F | SYSTOLIC BLOOD PRESSURE: 97 MMHG | HEART RATE: 85 BPM | HEIGHT: 65 IN | OXYGEN SATURATION: 95 % | WEIGHT: 183 LBS | DIASTOLIC BLOOD PRESSURE: 52 MMHG

## 2023-01-20 LAB — CALCIUM BLD-MCNC: 8.9 MG/DL (ref 8.5–10.1)

## 2023-01-20 RX ORDER — LEVOTHYROXINE SODIUM 0.1 MG/1
100 TABLET ORAL
Qty: 90 TABLET | Refills: 3 | Status: SHIPPED | OUTPATIENT
Start: 2023-01-21

## 2023-01-20 RX ORDER — CALCIUM CARBONATE 500(1250)
1000 TABLET ORAL
Qty: 90 TABLET | Refills: 3 | Status: SHIPPED | OUTPATIENT
Start: 2023-01-20

## 2023-01-20 RX ORDER — OXYCODONE HYDROCHLORIDE 5 MG/1
5 TABLET ORAL EVERY 4 HOURS PRN
Qty: 15 TABLET | Refills: 0 | Status: SHIPPED | OUTPATIENT
Start: 2023-01-20

## 2023-01-20 NOTE — PROGRESS NOTES
Patient is alert and oriented x3. Up ad mikey in hallway. Up to chair for meals. Voiding freely. Surgical Incision is CDI. Tolerating diet; denies nausea or vomiting. Patient assessed and ready for DC home. All instructions given to patient for home. All questions answered to patients level of satisfaction.  PIV removed and intact

## 2023-01-20 NOTE — PLAN OF CARE
A&Ox4. VSS. RA. . Denies chest pain and SOB. GI: Abdomen soft, nondistended. Denies nausea. : Voids. Pain well controlled. Up ad mikey. Incisions: Anterior neck incision--no drainage. Diet: Tolerating diet. All appropriate safety measures in place. All questions and concerns addressed. Will continue to monitor. Problem: Patient/Family Goals  Goal: Patient/Family Long Term Goal  Description: Patient's Long Term Goal: Discharge home    Interventions:  - pain control with prn/scheduled meds  - morning labs  - See additional Care Plan goals for specific interventions  Outcome: Progressing  Goal: Patient/Family Short Term Goal  Description: Patient's Short Term Goal: Pain control    Interventions:   - ice  - prn meds  - See additional Care Plan goals for specific interventions  Outcome: Progressing     Problem: PAIN - ADULT  Goal: Verbalizes/displays adequate comfort level or patient's stated pain goal  Description: INTERVENTIONS:  - Encourage pt to monitor pain and request assistance  - Assess pain using appropriate pain scale  - Administer analgesics based on type and severity of pain and evaluate response  - Implement non-pharmacological measures as appropriate and evaluate response  - Consider cultural and social influences on pain and pain management  - Manage/alleviate anxiety  - Utilize distraction and/or relaxation techniques  - Monitor for opioid side effects  - Notify MD/LIP if interventions unsuccessful or patient reports new pain  - Anticipate increased pain with activity and pre-medicate as appropriate  Outcome: Progressing     Problem: SAFETY ADULT - FALL  Goal: Free from fall injury  Description: INTERVENTIONS:  - Assess pt frequently for physical needs  - Identify cognitive and physical deficits and behaviors that affect risk of falls.   - [place] fall precautions as indicated by assessment.  - Educate pt/family on patient safety including physical limitations  - Instruct pt to call for assistance with activity based on assessment  - Modify environment to reduce risk of injury  - Provide assistive devices as appropriate  - Consider OT/PT consult to assist with strengthening/mobility  - Encourage toileting schedule  Outcome: Progressing

## 2023-01-20 NOTE — DISCHARGE INSTRUCTIONS
[address] [facility]  [place], [address] [facility]  [phone]    [address], [address] Franklin Alejandre MD, MD Symone Hoskins MD Drucilla Drummer, MD  THYROIDECTOMY INSTRUCTIONS    What to Expect in the Postoperative Period  You will spend the night in the hospital.  During this time the wound is monitored, as are your calcium levels in the blood. Mild to moderate postoperative pain is normal.  This is easily controlled with medication. A surgical drain in the wound is sometimes necessary to prevent accumulation of fluid under the wound. This is removed before you leave the hospital.  Sore throat and discomfort with swallowing are due to general anesthesia and the surgery itself, and are short term only. Voice hoarseness may be temporary or permanent, depending on the nature of your thyroid problem. Usually the voice sounds normal within 1-2 weeks of surgery, but if not, please notify your surgeon. Decreased blood calcium levels may manifest as numbness and tingling around the mouth & fingers, spontaneous twitching in the face or mouth, and muscle spasm and cramping. Please notify your surgeon or go to the Emergency Room if this condition occurs. Treatment typically involves oral calcium supplements. Bleeding is rare after thyroid surgery. If this occurs, extensive swelling of the neck can occur and may need to be drained. Notify your surgeon or go the Emergency Room immediately. Activity and Restrictions  You will need to rest (off work or school) for 1 week after surgery. No vigorous activity, heavy lifting (greater than 20 pounds), bending or straining for 2 weeks. No driving for 1 week. Medication  You will be given a prescription for pain medication (do not drive or drink alcohol while taking this medication). You may also be given calcium supplements to prevent low calcium levels in the blood.     Follow up  You will be seen in the office 1 week after surgery (call for an appointment if you do not already have one. You may be instructed to follow-up with your primary care physician or an endocrinologist if long-term use of thyroid hormone replacement is necessary.

## 2023-01-20 NOTE — PLAN OF CARE
A&Ox4. VSS. RA. Denies chest pain and SOB. GI: Abdomen soft, nondistended. Passage of gas. Denies nausea. : Voids. Pain controlled with scheduled pain medications. Up with standby assist.  Incisions: Exterior throat- gauze and tegaderm inplace- clean dry and intact. Skin: Scattered bruising to neck. Diet: Regular/ general- tolerating well. IVF running per order. All appropriate safety measures in place. All questions and concerns addressed. Problem: Patient/Family Goals  Goal: Patient/Family Long Term Goal  Description: Patient's Long Term Goal: Discharge home    Interventions:  - pain control with prn/scheduled meds  - morning labs  - See additional Care Plan goals for specific interventions  Outcome: Progressing  Goal: Patient/Family Short Term Goal  Description: Patient's Short Term Goal: Pain control    Interventions:   - ice  - prn meds  - See additional Care Plan goals for specific interventions  Outcome: Progressing     Problem: PAIN - ADULT  Goal: Verbalizes/displays adequate comfort level or patient's stated pain goal  Description: INTERVENTIONS:  - Encourage pt to monitor pain and request assistance  - Assess pain using appropriate pain scale  - Administer analgesics based on type and severity of pain and evaluate response  - Implement non-pharmacological measures as appropriate and evaluate response  - Consider cultural and social influences on pain and pain management  - Manage/alleviate anxiety  - Utilize distraction and/or relaxation techniques  - Monitor for opioid side effects  - Notify MD/LIP if interventions unsuccessful or patient reports new pain  - Anticipate increased pain with activity and pre-medicate as appropriate  Outcome: Progressing     Problem: SAFETY ADULT - FALL  Goal: Free from fall injury  Description: INTERVENTIONS:  - Assess pt frequently for physical needs  - Identify cognitive and physical deficits and behaviors that affect risk of falls.   - [place] fall precautions as indicated by assessment.  - Educate pt/family on patient safety including physical limitations  - Instruct pt to call for assistance with activity based on assessment  - Modify environment to reduce risk of injury  - Provide assistive devices as appropriate  - Consider OT/PT consult to assist with strengthening/mobility  - Encourage toileting schedule  Outcome: Progressing

## 2023-01-26 ENCOUNTER — OFFICE VISIT (OUTPATIENT)
Facility: LOCATION | Age: 73
End: 2023-01-26
Payer: MEDICARE

## 2023-01-26 DIAGNOSIS — E04.2 MULTIPLE THYROID NODULES: Primary | ICD-10-CM

## 2023-01-26 PROBLEM — E89.0 H/O TOTAL THYROIDECTOMY: Status: ACTIVE | Noted: 2023-01-26

## 2023-01-26 PROBLEM — Z98.890 H/O TOTAL THYROIDECTOMY: Status: ACTIVE | Noted: 2023-01-26

## 2023-01-26 PROBLEM — E89.0 POSTOPERATIVE HYPOTHYROIDISM: Status: ACTIVE | Noted: 2023-01-26

## 2023-01-26 PROBLEM — Z90.89 H/O TOTAL THYROIDECTOMY: Status: ACTIVE | Noted: 2023-01-26

## 2023-01-26 PROCEDURE — 1111F DSCHRG MED/CURRENT MED MERGE: CPT | Performed by: OTOLARYNGOLOGY

## 2023-01-26 PROCEDURE — 99024 POSTOP FOLLOW-UP VISIT: CPT | Performed by: OTOLARYNGOLOGY

## 2023-03-02 ENCOUNTER — HOSPITAL ENCOUNTER (OUTPATIENT)
Dept: GENERAL RADIOLOGY | Age: 73
Discharge: HOME | End: 2023-03-02
Attending: PHYSICIAN ASSISTANT
Payer: MEDICARE

## 2023-03-02 ENCOUNTER — OFFICE VISIT (OUTPATIENT)
Dept: INTERNAL MEDICINE CLINIC | Facility: CLINIC | Age: 73
End: 2023-03-02
Payer: MEDICARE

## 2023-03-02 DIAGNOSIS — M25.552 LEFT HIP PAIN: Primary | ICD-10-CM

## 2023-03-02 DIAGNOSIS — Z01.89 ENCOUNTER FOR LOWER EXTREMITY COMPARISON IMAGING STUDY: ICD-10-CM

## 2023-03-02 DIAGNOSIS — M25.552 LEFT HIP PAIN: ICD-10-CM

## 2023-03-02 DIAGNOSIS — M25.562 ACUTE PAIN OF LEFT KNEE: ICD-10-CM

## 2023-03-02 PROCEDURE — 99213 OFFICE O/P EST LOW 20 MIN: CPT | Performed by: PHYSICIAN ASSISTANT

## 2023-03-02 PROCEDURE — 73502 X-RAY EXAM HIP UNI 2-3 VIEWS: CPT | Performed by: PHYSICIAN ASSISTANT

## 2023-03-02 PROCEDURE — 73564 X-RAY EXAM KNEE 4 OR MORE: CPT | Performed by: PHYSICIAN ASSISTANT

## 2023-03-02 PROCEDURE — 73562 X-RAY EXAM OF KNEE 3: CPT | Performed by: PHYSICIAN ASSISTANT

## 2023-03-02 RX ORDER — HYDROCODONE BITARTRATE AND ACETAMINOPHEN 5; 325 MG/1; MG/1
1 TABLET ORAL EVERY 8 HOURS PRN
Qty: 10 TABLET | Refills: 0 | Status: SHIPPED | OUTPATIENT
Start: 2023-03-02

## 2023-03-02 NOTE — PATIENT INSTRUCTIONS
X-rays today. Call to schedule appointment with Dr. Jenny Gonzales or one of his partners (orthopedics).

## 2023-03-03 ENCOUNTER — TELEPHONE (OUTPATIENT)
Dept: ORTHOPEDICS CLINIC | Facility: CLINIC | Age: 73
End: 2023-03-03

## 2023-03-04 VITALS
HEART RATE: 92 BPM | HEIGHT: 64.75 IN | RESPIRATION RATE: 16 BRPM | BODY MASS INDEX: 31.74 KG/M2 | OXYGEN SATURATION: 99 % | WEIGHT: 188.19 LBS | TEMPERATURE: 100 F | DIASTOLIC BLOOD PRESSURE: 68 MMHG | SYSTOLIC BLOOD PRESSURE: 142 MMHG

## 2023-03-07 RX ORDER — MELOXICAM 15 MG/1
15 TABLET ORAL DAILY PRN
Qty: 14 TABLET | Refills: 0 | Status: SHIPPED | OUTPATIENT
Start: 2023-03-07

## 2023-03-07 RX ORDER — HYDROCODONE BITARTRATE AND ACETAMINOPHEN 5; 325 MG/1; MG/1
1 TABLET ORAL EVERY 8 HOURS PRN
Qty: 10 TABLET | Refills: 0 | Status: CANCELLED | OUTPATIENT
Start: 2023-03-07

## 2023-03-07 NOTE — TELEPHONE ENCOUNTER
Patient has appt with ortho 3/13 - requests refill of norco until time of that appt    [id number]    HYDROcodone-acetaminophen 5-325 MG Oral Tab

## 2023-03-07 NOTE — TELEPHONE ENCOUNTER
# L hip & knee pain, posterior thigh pain: XR ordered. Suspect some OA plus proximal hamstring tendonitis. Discussed use of OTC NSAIDs, tylenol, provided with short term rx for norco,  Referred to ortho.     RX pending and routed to LL

## 2023-03-07 NOTE — TELEPHONE ENCOUNTER
Left message for pt. Informed her I would also send mcm with detailed recs below. [facility] sent.

## 2023-03-13 ENCOUNTER — OFFICE VISIT (OUTPATIENT)
Dept: ORTHOPEDICS CLINIC | Facility: CLINIC | Age: 73
End: 2023-03-13
Payer: MEDICARE

## 2023-03-13 VITALS — WEIGHT: 184 LBS | OXYGEN SATURATION: 98 % | HEART RATE: 108 BPM | HEIGHT: 64 IN | BODY MASS INDEX: 31.41 KG/M2

## 2023-03-13 DIAGNOSIS — M16.12 PRIMARY OSTEOARTHRITIS OF LEFT HIP: ICD-10-CM

## 2023-03-13 DIAGNOSIS — M17.12 PRIMARY OSTEOARTHRITIS OF LEFT KNEE: Primary | ICD-10-CM

## 2023-03-13 RX ORDER — TRIAMCINOLONE ACETONIDE 40 MG/ML
40 INJECTION, SUSPENSION INTRA-ARTICULAR; INTRAMUSCULAR ONCE
Status: COMPLETED | OUTPATIENT
Start: 2023-03-13 | End: 2023-03-13

## 2023-03-13 RX ADMIN — TRIAMCINOLONE ACETONIDE 40 MG: 40 INJECTION, SUSPENSION INTRA-ARTICULAR; INTRAMUSCULAR at 12:24:00

## 2023-03-13 NOTE — PROCEDURES
After informed consent, the patient's left knee was marked, locally anesthetized with skin refrigerant, prepped with topical antiseptic, and injected with a mixture of 1mL 40mg/mL Kenalog, 2mL 1% lidocaine and 2mL 0.5% marcaine through the inferolateral portal.  A band-aid was applied. The patient tolerated the procedure well.     Dilshad Krishnamurthy PA-C  [address] [place],[address] Orthopedic Surgery

## 2023-03-17 ENCOUNTER — TELEPHONE (OUTPATIENT)
Dept: ORTHOPEDICS CLINIC | Facility: CLINIC | Age: 73
End: 2023-03-17

## 2023-03-17 NOTE — TELEPHONE ENCOUNTER
Pain called stating that she had a knee injection on 3/13/23 patient states that her leg pain has not improved and was hoping for a hip injection. Patient states that at the top of her leg her pain is 9/10 . Patient has taken motrin for pain but still in pain. Patient would like to know if she can be prescribed anything for pain. Notes from visit on 3/13/23 states     \" Ultimately, if no significant improvement of her thigh pain from the injection, we may consider formal physical therapy versus left hip intra-articular injection. \". There is no PT order,  Please advise on what further steps patient should take. Thank you.

## 2023-03-27 ENCOUNTER — PATIENT MESSAGE (OUTPATIENT)
Dept: ORTHOPEDICS CLINIC | Facility: CLINIC | Age: 73
End: 2023-03-27

## 2023-03-27 DIAGNOSIS — M16.12 PRIMARY OSTEOARTHRITIS OF LEFT HIP: Primary | ICD-10-CM

## 2023-04-07 ENCOUNTER — HOSPITAL ENCOUNTER (OUTPATIENT)
Dept: GENERAL RADIOLOGY | Facility: HOSPITAL | Age: 73
Discharge: HOME | End: 2023-04-07
Attending: PHYSICIAN ASSISTANT
Payer: MEDICARE

## 2023-04-07 DIAGNOSIS — M16.12 PRIMARY OSTEOARTHRITIS OF LEFT HIP: ICD-10-CM

## 2023-04-07 PROCEDURE — 20610 DRAIN/INJ JOINT/BURSA W/O US: CPT | Performed by: PHYSICIAN ASSISTANT

## 2023-04-07 PROCEDURE — 77002 NEEDLE LOCALIZATION BY XRAY: CPT | Performed by: PHYSICIAN ASSISTANT

## 2023-04-07 RX ORDER — TRIAMCINOLONE ACETONIDE 40 MG/ML
INJECTION, SUSPENSION INTRA-ARTICULAR; INTRAMUSCULAR
Status: COMPLETED
Start: 2023-04-07 | End: 2023-04-07

## 2023-04-07 RX ORDER — BUPIVACAINE HYDROCHLORIDE 5 MG/ML
INJECTION, SOLUTION EPIDURAL; INTRACAUDAL
Status: COMPLETED
Start: 2023-04-07 | End: 2023-04-07

## 2023-04-10 ENCOUNTER — TELEPHONE (OUTPATIENT)
Dept: INTERNAL MEDICINE CLINIC | Facility: CLINIC | Age: 73
End: 2023-04-10

## 2023-04-10 ENCOUNTER — TELEPHONE (OUTPATIENT)
Dept: ORTHOPEDICS CLINIC | Facility: CLINIC | Age: 73
End: 2023-04-10

## 2023-04-10 DIAGNOSIS — L60.0 INGROWN TOENAIL: Primary | ICD-10-CM

## 2023-04-10 NOTE — TELEPHONE ENCOUNTER
We can offer a temporary placard for 3 months if she would like. She would need to ask PCP for permanent placard. If she is interested, then we can fill out a form for her.

## 2023-04-10 NOTE — TELEPHONE ENCOUNTER
Pt looking for recommendations for a podiatrist she can see for ingrown toenails. Pt requests a [facility] sent if she does not answer the phone.

## 2023-04-10 NOTE — TELEPHONE ENCOUNTER
Called patient to inform her of the following , received no answer my-chart message sent to patient asking for a call to discuss .

## 2023-04-10 NOTE — TELEPHONE ENCOUNTER
Patient called back and confirmed she would like a permanent placard. Patient was advised to call PCP office to request this form as we can only do 3 month placard per Felton Fisher. No further questions from patient.

## 2023-04-14 ENCOUNTER — TELEPHONE (OUTPATIENT)
Facility: LOCATION | Age: 73
End: 2023-04-14

## 2023-04-14 DIAGNOSIS — E04.2 MULTIPLE THYROID NODULES: Primary | ICD-10-CM

## 2023-04-14 RX ORDER — LEVOTHYROXINE SODIUM 0.1 MG/1
100 TABLET ORAL
Qty: 90 TABLET | Refills: 3 | Status: SHIPPED | OUTPATIENT
Start: 2023-04-14

## 2023-04-14 NOTE — TELEPHONE ENCOUNTER
She is postop thyroidectomy in January. She will continue on the 100 mcg of Synthroid. Will undergo thyroid function test.  Instructed her that going forward Dr. Anil Renee will be able to handle thyroid hormone monitoring and Synthroid. Obviously, I will be available at any time.

## 2023-05-15 ENCOUNTER — LAB ENCOUNTER (OUTPATIENT)
Dept: LAB | Age: 73
End: 2023-05-15
Attending: OTOLARYNGOLOGY
Payer: MEDICARE

## 2023-05-15 DIAGNOSIS — E04.2 MULTIPLE THYROID NODULES: ICD-10-CM

## 2023-05-15 LAB
T4 FREE SERPL-MCNC: 1.2 NG/DL (ref 0.8–1.7)
TSI SER-ACNC: 7.22 MIU/ML (ref 0.36–3.74)

## 2023-05-15 PROCEDURE — 36415 COLL VENOUS BLD VENIPUNCTURE: CPT

## 2023-05-15 PROCEDURE — 84443 ASSAY THYROID STIM HORMONE: CPT

## 2023-05-15 PROCEDURE — 84439 ASSAY OF FREE THYROXINE: CPT

## 2023-05-24 ENCOUNTER — HOSPITAL ENCOUNTER (OUTPATIENT)
Dept: CT IMAGING | Age: 73
Discharge: HOME | End: 2023-05-24
Attending: INTERNAL MEDICINE
Payer: MEDICARE

## 2023-05-24 DIAGNOSIS — C55 METASTASIS FROM MALIGNANT NEOPLASM OF UTERUS (HCC): ICD-10-CM

## 2023-05-24 DIAGNOSIS — C79.9 METASTASIS FROM MALIGNANT NEOPLASM OF UTERUS (HCC): ICD-10-CM

## 2023-05-24 PROCEDURE — 71260 CT THORAX DX C+: CPT | Performed by: INTERNAL MEDICINE

## 2023-05-24 PROCEDURE — 82565 ASSAY OF CREATININE: CPT

## 2023-05-24 PROCEDURE — 74177 CT ABD & PELVIS W/CONTRAST: CPT | Performed by: INTERNAL MEDICINE

## 2023-05-25 LAB
CREAT BLD-MCNC: 1 MG/DL
GFR SERPLBLD BASED ON 1.73 SQ M-ARVRAT: 60 ML/MIN/1.73M2 (ref 60–?)

## 2023-06-09 DIAGNOSIS — D72.829 LEUKOCYTOSIS, UNSPECIFIED TYPE: Primary | ICD-10-CM

## 2023-06-12 ENCOUNTER — OFFICE VISIT (OUTPATIENT)
Dept: ORTHOPEDICS CLINIC | Facility: CLINIC | Age: 73
End: 2023-06-12
Payer: MEDICARE

## 2023-06-12 VITALS — BODY MASS INDEX: 31.41 KG/M2 | WEIGHT: 184 LBS | HEIGHT: 64 IN

## 2023-06-12 DIAGNOSIS — M16.12 PRIMARY OSTEOARTHRITIS OF LEFT HIP: Primary | ICD-10-CM

## 2023-06-12 PROCEDURE — 99213 OFFICE O/P EST LOW 20 MIN: CPT | Performed by: PHYSICIAN ASSISTANT

## 2023-06-12 PROCEDURE — 1125F AMNT PAIN NOTED PAIN PRSNT: CPT | Performed by: PHYSICIAN ASSISTANT

## 2023-06-12 RX ORDER — LETROZOLE 2.5 MG/1
2.5 TABLET, FILM COATED ORAL DAILY
Qty: 90 TABLET | Refills: 0 | Status: SHIPPED | OUTPATIENT
Start: 2023-06-12

## 2023-06-12 RX ORDER — TRAMADOL HYDROCHLORIDE 50 MG/1
50 TABLET ORAL EVERY 8 HOURS PRN
Qty: 45 TABLET | Refills: 0 | Status: SHIPPED | OUTPATIENT
Start: 2023-06-12

## 2023-06-12 NOTE — PROGRESS NOTES
[facility] Progress Note    Subjective: Patient returns to clinic after last being seen on 3/13/2023 for left hip and left knee pain. She received an injection into the left knee at that appointment which has been helpful. However, she underwent interventional radiology guided left hip joint steroid injection in April which only helped for about 10 days. She continues to experience mostly left hip pain in the groin area which has limited her ability to perform prolonged activities. She is interested in further options at this point in time for treatment for the left hip. Objective: Patient seated comfortably in the exam chair. Alert and oriented x3. Nonlabored breathing. Grossly neurologically intact. Ambulating with a cane in clinic today. Assessment/Plan: Patient with persistent left hip pain in the setting of severe left hip osteoarthritis, failed intra-articular left hip steroid injection. We did discuss other options for treatment including continued nonsurgical management with anti-inflammatories, physical therapy. We also discussed surgical intervention briefly which would entail left hip replacement surgery. Hip binder was given to the patient in clinic today, and after further discussion the patient would like to meet with Dr. Stanton Awad for surgical discussion of the left hip. In the meantime, I did send a prescription of tramadol to the patient's pharmacy for pain control. I advised her to take this no more than 3 times per day as needed for pain control. She may continue to take NSAIDs while using this medication. We may continue with nonsurgical management for her left knee as this has been helpful. The patient's questions were sought and answered satisfactorily. She will follow-up in the near future with Dr. Stanton Awad.     X-rays needed at next visit: None        Ashley Zavaleta PA-C  [address] Orthopedic Surgery    This note was dictated using Dragon software. While it was briefly proofread prior to completion, some grammatical, spelling, and word choice errors due to dictation may still occur.

## 2023-06-19 ENCOUNTER — OFFICE VISIT (OUTPATIENT)
Dept: ORTHOPEDICS CLINIC | Facility: CLINIC | Age: 73
End: 2023-06-19
Payer: MEDICARE

## 2023-06-19 VITALS — HEIGHT: 64 IN | BODY MASS INDEX: 31.58 KG/M2 | WEIGHT: 185 LBS

## 2023-06-19 DIAGNOSIS — M16.12 PRIMARY OSTEOARTHRITIS OF LEFT HIP: Primary | ICD-10-CM

## 2023-06-19 PROCEDURE — 99214 OFFICE O/P EST MOD 30 MIN: CPT | Performed by: ORTHOPAEDIC SURGERY

## 2023-06-30 DIAGNOSIS — M16.12 PRIMARY OSTEOARTHRITIS OF LEFT HIP: ICD-10-CM

## 2023-06-30 RX ORDER — TRAMADOL HYDROCHLORIDE 50 MG/1
50 TABLET ORAL EVERY 8 HOURS PRN
Qty: 45 TABLET | Refills: 0 | Status: SHIPPED | OUTPATIENT
Start: 2023-06-30

## 2023-07-06 ENCOUNTER — TELEPHONE (OUTPATIENT)
Dept: HEMATOLOGY/ONCOLOGY | Facility: HOSPITAL | Age: 73
End: 2023-07-06

## 2023-07-06 ENCOUNTER — OFFICE VISIT (OUTPATIENT)
Dept: HEMATOLOGY/ONCOLOGY | Facility: HOSPITAL | Age: 73
End: 2023-07-06
Attending: INTERNAL MEDICINE
Payer: MEDICARE

## 2023-07-06 ENCOUNTER — TELEPHONE (OUTPATIENT)
Dept: INTERNAL MEDICINE CLINIC | Facility: CLINIC | Age: 73
End: 2023-07-06

## 2023-07-06 VITALS
OXYGEN SATURATION: 96 % | RESPIRATION RATE: 18 BRPM | BODY MASS INDEX: 32 KG/M2 | DIASTOLIC BLOOD PRESSURE: 92 MMHG | HEART RATE: 112 BPM | SYSTOLIC BLOOD PRESSURE: 162 MMHG | TEMPERATURE: 99 F | WEIGHT: 188.5 LBS

## 2023-07-06 DIAGNOSIS — C55 METASTASIS FROM MALIGNANT NEOPLASM OF UTERUS (HCC): Primary | ICD-10-CM

## 2023-07-06 DIAGNOSIS — C79.9 METASTASIS FROM MALIGNANT NEOPLASM OF UTERUS (HCC): Primary | ICD-10-CM

## 2023-07-06 DIAGNOSIS — R79.9 ABNORMAL FINDING OF BLOOD CHEMISTRY, UNSPECIFIED: ICD-10-CM

## 2023-07-06 LAB
ALBUMIN SERPL-MCNC: 4.1 G/DL (ref 3.4–5)
ALBUMIN/GLOB SERPL: 1.1 {RATIO} (ref 1–2)
ALP LIVER SERPL-CCNC: 82 U/L
ALT SERPL-CCNC: 13 U/L
ANION GAP SERPL CALC-SCNC: 7 MMOL/L (ref 0–18)
AST SERPL-CCNC: 11 U/L (ref 15–37)
BASOPHILS # BLD AUTO: 0.08 X10(3) UL (ref 0–0.2)
BASOPHILS NFR BLD AUTO: 0.6 %
BILIRUB SERPL-MCNC: 0.7 MG/DL (ref 0.1–2)
BUN BLD-MCNC: 25 MG/DL (ref 7–18)
CALCIUM BLD-MCNC: 9 MG/DL (ref 8.5–10.1)
CHLORIDE SERPL-SCNC: 111 MMOL/L (ref 98–112)
CHOLEST SERPL-MCNC: 174 MG/DL (ref ?–200)
CO2 SERPL-SCNC: 23 MMOL/L (ref 21–32)
CREAT BLD-MCNC: 1.03 MG/DL
CRP SERPL-MCNC: 4.78 MG/DL (ref ?–0.3)
DEPRECATED HBV CORE AB SER IA-ACNC: 197.8 NG/ML
EOSINOPHIL # BLD AUTO: 0.19 X10(3) UL (ref 0–0.7)
EOSINOPHIL NFR BLD AUTO: 1.4 %
ERYTHROCYTE [DISTWIDTH] IN BLOOD BY AUTOMATED COUNT: 12.8 %
FASTING PATIENT LIPID ANSWER: YES
FASTING STATUS PATIENT QL REPORTED: YES
GFR SERPLBLD BASED ON 1.73 SQ M-ARVRAT: 57 ML/MIN/1.73M2 (ref 60–?)
GLOBULIN PLAS-MCNC: 3.7 G/DL (ref 2.8–4.4)
GLUCOSE BLD-MCNC: 131 MG/DL (ref 70–99)
HCT VFR BLD AUTO: 36 %
HDLC SERPL-MCNC: 55 MG/DL (ref 40–59)
HGB BLD-MCNC: 11.7 G/DL
IMM GRANULOCYTES # BLD AUTO: 0.06 X10(3) UL (ref 0–1)
IMM GRANULOCYTES NFR BLD: 0.4 %
IRON SATN MFR SERPL: 7 %
IRON SERPL-MCNC: 26 UG/DL
LDLC SERPL CALC-MCNC: 96 MG/DL (ref ?–100)
LYMPHOCYTES # BLD AUTO: 2 X10(3) UL (ref 1–4)
LYMPHOCYTES NFR BLD AUTO: 14.6 %
MCH RBC QN AUTO: 27.9 PG (ref 26–34)
MCHC RBC AUTO-ENTMCNC: 32.5 G/DL (ref 31–37)
MCV RBC AUTO: 85.9 FL
MONOCYTES # BLD AUTO: 0.87 X10(3) UL (ref 0.1–1)
MONOCYTES NFR BLD AUTO: 6.3 %
NEUTROPHILS # BLD AUTO: 10.54 X10 (3) UL (ref 1.5–7.7)
NEUTROPHILS # BLD AUTO: 10.54 X10(3) UL (ref 1.5–7.7)
NEUTROPHILS NFR BLD AUTO: 76.7 %
NONHDLC SERPL-MCNC: 119 MG/DL (ref ?–130)
OSMOLALITY SERPL CALC.SUM OF ELEC: 298 MOSM/KG (ref 275–295)
PLATELET # BLD AUTO: 276 10(3)UL (ref 150–450)
POTASSIUM SERPL-SCNC: 4.1 MMOL/L (ref 3.5–5.1)
PROCALCITONIN SERPL-MCNC: <0.05 NG/ML (ref ?–0.16)
PROT SERPL-MCNC: 7.8 G/DL (ref 6.4–8.2)
RBC # BLD AUTO: 4.19 X10(6)UL
SODIUM SERPL-SCNC: 141 MMOL/L (ref 136–145)
TIBC SERPL-MCNC: 365 UG/DL (ref 240–450)
TRANSFERRIN SERPL-MCNC: 245 MG/DL (ref 200–360)
TRIGL SERPL-MCNC: 130 MG/DL (ref 30–149)
VLDLC SERPL CALC-MCNC: 21 MG/DL (ref 0–30)
WBC # BLD AUTO: 13.7 X10(3) UL (ref 4–11)

## 2023-07-06 PROCEDURE — 99215 OFFICE O/P EST HI 40 MIN: CPT | Performed by: INTERNAL MEDICINE

## 2023-07-06 RX ORDER — ENOXAPARIN SODIUM 100 MG/ML
40 INJECTION SUBCUTANEOUS DAILY
Qty: 16.8 ML | Refills: 0 | Status: SHIPPED | OUTPATIENT
Start: 2023-07-06 | End: 2023-08-17

## 2023-07-06 NOTE — TELEPHONE ENCOUNTER
Pt called back, she states she is going to have hip replacement surgery with . She does not have a scheduled date yet. She will cb to schedule pre-op once she has date, she is not going to schedule routine physical right now due to the conflict. SV FYI-  Pt would like you to be aware of possible upcoming surgery and would like you to view OV notes from .

## 2023-07-06 NOTE — PROGRESS NOTES
Outpatient Oncology Care Plan   Problem list:   fatigue   Problems related to:   self care   Interventions:   provided general teaching   Expected outcomes:   understands plan of care   Progress towards outcome: making progress     Education Record   Learner: Patient   Barriers / Limitations: None   Method: Discussion   Outcome: Shows understanding   Comments:  Patient here for follow-up. Remains on letrozole. Will have upcoming hip replacement. Date TBD. Recent CT scan performed.

## 2023-07-06 NOTE — TELEPHONE ENCOUNTER
Left a voicemail to callback. Her WBC is slightly elevated. Her CRP is markedly elevated. PCT was also added. Her Cr is also slightly elevated. Will need to evaluate for infectious symptoms. Requested a call back.

## 2023-07-07 ENCOUNTER — HOSPITAL ENCOUNTER (OUTPATIENT)
Dept: GENERAL RADIOLOGY | Age: 73
Discharge: HOME | End: 2023-07-07
Attending: INTERNAL MEDICINE
Payer: MEDICARE

## 2023-07-07 ENCOUNTER — OFFICE VISIT (OUTPATIENT)
Dept: HEMATOLOGY/ONCOLOGY | Facility: HOSPITAL | Age: 73
End: 2023-07-07
Attending: INTERNAL MEDICINE
Payer: MEDICARE

## 2023-07-07 VITALS
DIASTOLIC BLOOD PRESSURE: 99 MMHG | OXYGEN SATURATION: 96 % | RESPIRATION RATE: 16 BRPM | HEART RATE: 115 BPM | WEIGHT: 182.38 LBS | BODY MASS INDEX: 31 KG/M2 | SYSTOLIC BLOOD PRESSURE: 187 MMHG | TEMPERATURE: 98 F

## 2023-07-07 DIAGNOSIS — D72.829 LEUKOCYTOSIS, UNSPECIFIED TYPE: ICD-10-CM

## 2023-07-07 DIAGNOSIS — R79.9 ABNORMAL FINDING OF BLOOD CHEMISTRY, UNSPECIFIED: ICD-10-CM

## 2023-07-07 DIAGNOSIS — C55 METASTASIS FROM MALIGNANT NEOPLASM OF UTERUS (HCC): ICD-10-CM

## 2023-07-07 DIAGNOSIS — C79.9 METASTASIS FROM MALIGNANT NEOPLASM OF UTERUS (HCC): ICD-10-CM

## 2023-07-07 DIAGNOSIS — D50.9 IRON DEFICIENCY ANEMIA, UNSPECIFIED IRON DEFICIENCY ANEMIA TYPE: Primary | ICD-10-CM

## 2023-07-07 PROBLEM — D64.89 OTHER SPECIFIED ANEMIAS: Status: ACTIVE | Noted: 2023-07-07

## 2023-07-07 LAB
ANION GAP SERPL CALC-SCNC: 5 MMOL/L (ref 0–18)
BILIRUB UR QL STRIP.AUTO: NEGATIVE
BUN BLD-MCNC: 25 MG/DL (ref 7–18)
CALCIUM BLD-MCNC: 9.2 MG/DL (ref 8.5–10.1)
CHLORIDE SERPL-SCNC: 109 MMOL/L (ref 98–112)
CO2 SERPL-SCNC: 25 MMOL/L (ref 21–32)
COLOR UR AUTO: YELLOW
CREAT BLD-MCNC: 1.06 MG/DL
FASTING STATUS PATIENT QL REPORTED: NO
FOLATE SERPL-MCNC: 43.5 NG/ML (ref 8.7–?)
GFR SERPLBLD BASED ON 1.73 SQ M-ARVRAT: 55 ML/MIN/1.73M2 (ref 60–?)
GLUCOSE BLD-MCNC: 101 MG/DL (ref 70–99)
GLUCOSE UR STRIP.AUTO-MCNC: NEGATIVE MG/DL
KETONES UR STRIP.AUTO-MCNC: NEGATIVE MG/DL
NITRITE UR QL STRIP.AUTO: NEGATIVE
OSMOLALITY SERPL CALC.SUM OF ELEC: 293 MOSM/KG (ref 275–295)
PH UR STRIP.AUTO: 5 [PH] (ref 5–8)
POTASSIUM SERPL-SCNC: 3.9 MMOL/L (ref 3.5–5.1)
PROT UR STRIP.AUTO-MCNC: 100 MG/DL
RBC #/AREA URNS AUTO: >10 /HPF
RBC UR QL AUTO: NEGATIVE
SODIUM SERPL-SCNC: 139 MMOL/L (ref 136–145)
SP GR UR STRIP.AUTO: 1.01 (ref 1–1.03)
UROBILINOGEN UR STRIP.AUTO-MCNC: <2 MG/DL
VIT B12 SERPL-MCNC: 404 PG/ML (ref 193–986)
WBC #/AREA URNS AUTO: >50 /HPF
WBC CLUMPS UR QL AUTO: PRESENT /HPF

## 2023-07-07 PROCEDURE — 81001 URINALYSIS AUTO W/SCOPE: CPT

## 2023-07-07 PROCEDURE — 82607 VITAMIN B-12: CPT

## 2023-07-07 PROCEDURE — 87186 SC STD MICRODIL/AGAR DIL: CPT

## 2023-07-07 PROCEDURE — 96365 THER/PROPH/DIAG IV INF INIT: CPT

## 2023-07-07 PROCEDURE — 71046 X-RAY EXAM CHEST 2 VIEWS: CPT | Performed by: INTERNAL MEDICINE

## 2023-07-07 PROCEDURE — 87040 BLOOD CULTURE FOR BACTERIA: CPT

## 2023-07-07 PROCEDURE — 80048 BASIC METABOLIC PNL TOTAL CA: CPT

## 2023-07-07 PROCEDURE — 87088 URINE BACTERIA CULTURE: CPT

## 2023-07-07 PROCEDURE — 87086 URINE CULTURE/COLONY COUNT: CPT

## 2023-07-07 PROCEDURE — 96361 HYDRATE IV INFUSION ADD-ON: CPT

## 2023-07-07 PROCEDURE — 82746 ASSAY OF FOLIC ACID SERUM: CPT

## 2023-07-07 RX ORDER — LEVOFLOXACIN 500 MG/1
500 TABLET, FILM COATED ORAL DAILY
Qty: 7 TABLET | Refills: 0 | Status: SHIPPED | OUTPATIENT
Start: 2023-07-07 | End: 2023-07-14

## 2023-07-07 NOTE — PROGRESS NOTES
Education Record    Learner:  Patient    Disease / Diagnosis:IV fluids + C BLDx 2 + UA +C UR + Iron Infusion     Barriers / Limitations:  None   Comments:    Method:  Brief focused   Comments:    General Topics:  Infection, Medication, Pain, Precautions, Procedure, Side effects and symptom management, Plan of care reviewed, and Fall risk and prevention   Comments:    Outcome:  Shows understanding   Comments:    Patient received IV fluids + IV  IV then labs were drawn. 2 sets of  periferal C BLD drawn from 2 different arms. UA shows UTI.  Joie RN notified  and he called Levuin prescription to local pharmacy

## 2023-07-10 ENCOUNTER — TELEPHONE (OUTPATIENT)
Dept: ORTHOPEDICS CLINIC | Facility: CLINIC | Age: 73
End: 2023-07-10

## 2023-07-10 DIAGNOSIS — M16.12 PRIMARY OSTEOARTHRITIS OF LEFT HIP: Primary | ICD-10-CM

## 2023-07-10 NOTE — TELEPHONE ENCOUNTER
Patient stated she consulted swolfgang w/ / Nick Valadez at last visit and wanted to start the process on scheduling sx as discussed, please be advised.

## 2023-07-10 NOTE — TELEPHONE ENCOUNTER
Date of Surgery: 8/24/23     Post Op Appt: 9/6/23 @ 9AM     Case ID: [id number]     Notes:            SURGERY SCHEDULING SHEET     [facility]  6/3/1950  [id number]     Procedure: Left posterior cemented total hip arthroplasty     CPT: 96809     Diagnosis: Left hip osteoarthritis     Anesthesia: Choice     Length of Surgery: 2 hours     Disposition: Inpatient     Instruments: Citlalli DUKE     Assist: If case scheduled on Thurs/Fri, then Dolores Cannon first assist. If case scheduled on Tues, then Taye Newsome ([phone]) first assist. If Tete Alberto unavailable, then please communicate to Chelsy Johnson/Luis/Albert to cancel Kevin's clinic for that day and have Chelsy Thibodeaux first assist. If Chelsy Thibodeaux unavailable, contact Lisa Lucas for first assist. If Tete Alberto and Chandrakant Lee unavailable, then contact [facility] for first assist.     Pre-op Testing: CBC, CMP, PT/INR, PTT, TYPE AND SCREEN, and MRSA/MSSA THROUGH EDW PAT     Clearance: MEDICAL/PCP     Post op: 2 weeks postop appointment with Dolores Cannon     Surgery date specifics: Next available     April Angel MD, [address] [facility] Orthopedic Surgery  Phone: [phone]  Fax: [phone]

## 2023-07-11 ENCOUNTER — DOCUMENTATION ONLY (OUTPATIENT)
Dept: ORTHOPEDICS CLINIC | Facility: CLINIC | Age: 73
End: 2023-07-11

## 2023-07-11 ENCOUNTER — TELEPHONE (OUTPATIENT)
Dept: INTERNAL MEDICINE CLINIC | Facility: CLINIC | Age: 73
End: 2023-07-11

## 2023-07-11 DIAGNOSIS — M16.12 PRIMARY OSTEOARTHRITIS OF LEFT HIP: Primary | ICD-10-CM

## 2023-07-11 NOTE — PROGRESS NOTES
SURGERY SCHEDULING SHEET    [facility]  6/3/1950  [id number]    Procedure: Left posterior cemented total hip arthroplasty    CPT: 87606    Diagnosis: Left hip osteoarthritis    Anesthesia: Choice    Length of Surgery: 2 hours    Disposition: Inpatient    Instruments: Citlalli DUKE    Assist: If case scheduled on Thurs/Fri, then Dolores Cannon first assist. If case scheduled on Tues, then Taye Newsome ([phone]) first assist. If Tete Alberto unavailable, then please communicate to Chelsy Johnson/Luis/Albert to cancel [facility] clinic for that day and have Chelsy Thibodeaux first assist. If Chelsy Thibodeaux unavailable, contact Lisa Lucas for first assist. If Tete Alberto and Chandrakant Lee unavailable, then contact [facility] for first assist.    Pre-op Testing: CBC, CMP, PT/INR, PTT, TYPE AND SCREEN, and MRSA/MSSA THROUGH EDW PAT    Clearance: MEDICAL/PCP    Post op: 2 weeks postop appointment with Dolores Cannon    Surgery date specifics: Next available    April Angel MD, [address] Orthopedic Surgery  Phone: [phone]  Fax: [phone]

## 2023-07-11 NOTE — TELEPHONE ENCOUNTER
Incoming fax from [address]  surgery   Pt is having left posterior cemented total hip arthroplasty on 8/24/23 with Dr. Reed Palma   Please assist pt with scheduling pre-op appointment   Pre-op requirements place in pre-op folder in pod 2

## 2023-07-19 ENCOUNTER — TELEPHONE (OUTPATIENT)
Dept: ORTHOPEDICS CLINIC | Facility: CLINIC | Age: 73
End: 2023-07-19

## 2023-07-19 DIAGNOSIS — M25.561 RIGHT KNEE PAIN, UNSPECIFIED CHRONICITY: Primary | ICD-10-CM

## 2023-07-19 NOTE — TELEPHONE ENCOUNTER
Imaging was completed for this patient for a rt knee pain, but it needs to be reviewed to see if additional imaging is needed. If so, please enter the appropriate RX and let the patient know to come in before their appointment to complete the additional imaging. Thank you!     Future Appointments   Date Time Provider [place]   7/20/2023 11:00 AM Vernon howell, DO [facility] [place]   8/7/2023 11:30 AM Fer Daugherty, DO [facility] [place]   8/9/2023  3:00 PM Dafne Chappell PA-C [facility]   9/6/2023  9:00 AM Dafne Chappell PA-C [facility]

## 2023-07-20 ENCOUNTER — OFFICE VISIT (OUTPATIENT)
Dept: INTERNAL MEDICINE CLINIC | Facility: CLINIC | Age: 73
End: 2023-07-20
Payer: MEDICARE

## 2023-07-20 VITALS
OXYGEN SATURATION: 99 % | HEART RATE: 100 BPM | SYSTOLIC BLOOD PRESSURE: 180 MMHG | DIASTOLIC BLOOD PRESSURE: 100 MMHG | HEIGHT: 64 IN | WEIGHT: 187.81 LBS | TEMPERATURE: 98 F | BODY MASS INDEX: 32.06 KG/M2 | RESPIRATION RATE: 14 BRPM

## 2023-07-20 DIAGNOSIS — N30.00 ACUTE CYSTITIS WITHOUT HEMATURIA: ICD-10-CM

## 2023-07-20 DIAGNOSIS — I10 PRIMARY HYPERTENSION: ICD-10-CM

## 2023-07-20 DIAGNOSIS — E89.0 POSTOPERATIVE HYPOTHYROIDISM: Primary | ICD-10-CM

## 2023-07-20 DIAGNOSIS — M16.12 ARTHRITIS OF LEFT HIP: ICD-10-CM

## 2023-07-20 PROCEDURE — 99214 OFFICE O/P EST MOD 30 MIN: CPT | Performed by: INTERNAL MEDICINE

## 2023-07-20 RX ORDER — AMLODIPINE BESYLATE 5 MG/1
5 TABLET ORAL DAILY
Qty: 90 TABLET | Refills: 0 | Status: SHIPPED | OUTPATIENT
Start: 2023-07-20 | End: 2024-07-14

## 2023-07-20 RX ORDER — LEVOTHYROXINE SODIUM 0.1 MG/1
100 TABLET ORAL
Qty: 90 TABLET | Refills: 3 | Status: SHIPPED | OUTPATIENT
Start: 2023-07-20

## 2023-07-20 NOTE — PATIENT INSTRUCTIONS
I have refilled your thyroid medication   I have started you on amlodipine for your blood pressure.  You can take it at night time  Check your blood pressure at home

## 2023-08-02 ENCOUNTER — LABORATORY ENCOUNTER (OUTPATIENT)
Dept: LAB | Age: 73
End: 2023-08-02
Attending: ORTHOPAEDIC SURGERY
Payer: MEDICARE

## 2023-08-02 ENCOUNTER — EKG ENCOUNTER (OUTPATIENT)
Dept: LAB | Age: 73
End: 2023-08-02
Attending: ORTHOPAEDIC SURGERY
Payer: MEDICARE

## 2023-08-02 DIAGNOSIS — M16.12 OSTEOARTHRITIS OF LEFT HIP: ICD-10-CM

## 2023-08-02 LAB
ALBUMIN SERPL-MCNC: 4 G/DL (ref 3.4–5)
ALBUMIN/GLOB SERPL: 1.1 {RATIO} (ref 1–2)
ALP LIVER SERPL-CCNC: 68 U/L
ALT SERPL-CCNC: 16 U/L
ANION GAP SERPL CALC-SCNC: 8 MMOL/L (ref 0–18)
ANTIBODY SCREEN: NEGATIVE
APTT PPP: 30.9 SECONDS (ref 23.3–35.6)
AST SERPL-CCNC: 13 U/L (ref 15–37)
BASOPHILS # BLD AUTO: 0.07 X10(3) UL (ref 0–0.2)
BASOPHILS NFR BLD AUTO: 0.8 %
BILIRUB SERPL-MCNC: 0.5 MG/DL (ref 0.1–2)
BUN BLD-MCNC: 15 MG/DL (ref 7–18)
CALCIUM BLD-MCNC: 9.2 MG/DL (ref 8.5–10.1)
CHLORIDE SERPL-SCNC: 108 MMOL/L (ref 98–112)
CO2 SERPL-SCNC: 24 MMOL/L (ref 21–32)
CREAT BLD-MCNC: 0.81 MG/DL
EGFRCR SERPLBLD CKD-EPI 2021: 77 ML/MIN/1.73M2 (ref 60–?)
EOSINOPHIL # BLD AUTO: 0.15 X10(3) UL (ref 0–0.7)
EOSINOPHIL NFR BLD AUTO: 1.8 %
ERYTHROCYTE [DISTWIDTH] IN BLOOD BY AUTOMATED COUNT: 13.4 %
FASTING STATUS PATIENT QL REPORTED: NO
GLOBULIN PLAS-MCNC: 3.5 G/DL (ref 2.8–4.4)
GLUCOSE BLD-MCNC: 97 MG/DL (ref 70–99)
HCT VFR BLD AUTO: 34.1 %
HGB BLD-MCNC: 11 G/DL
IMM GRANULOCYTES # BLD AUTO: 0.05 X10(3) UL (ref 0–1)
IMM GRANULOCYTES NFR BLD: 0.6 %
INR BLD: 1.12 (ref 0.85–1.16)
LYMPHOCYTES # BLD AUTO: 2.23 X10(3) UL (ref 1–4)
LYMPHOCYTES NFR BLD AUTO: 26.8 %
MCH RBC QN AUTO: 27.6 PG (ref 26–34)
MCHC RBC AUTO-ENTMCNC: 32.3 G/DL (ref 31–37)
MCV RBC AUTO: 85.7 FL
MONOCYTES # BLD AUTO: 0.5 X10(3) UL (ref 0.1–1)
MONOCYTES NFR BLD AUTO: 6 %
NEUTROPHILS # BLD AUTO: 5.33 X10 (3) UL (ref 1.5–7.7)
NEUTROPHILS # BLD AUTO: 5.33 X10(3) UL (ref 1.5–7.7)
NEUTROPHILS NFR BLD AUTO: 64 %
OSMOLALITY SERPL CALC.SUM OF ELEC: 291 MOSM/KG (ref 275–295)
PLATELET # BLD AUTO: 246 10(3)UL (ref 150–450)
POTASSIUM SERPL-SCNC: 3.7 MMOL/L (ref 3.5–5.1)
PROT SERPL-MCNC: 7.5 G/DL (ref 6.4–8.2)
PROTHROMBIN TIME: 14.4 SECONDS (ref 11.6–14.8)
RBC # BLD AUTO: 3.98 X10(6)UL
RH BLOOD TYPE: POSITIVE
SODIUM SERPL-SCNC: 140 MMOL/L (ref 136–145)
WBC # BLD AUTO: 8.3 X10(3) UL (ref 4–11)

## 2023-08-02 PROCEDURE — 87081 CULTURE SCREEN ONLY: CPT

## 2023-08-02 PROCEDURE — 87147 CULTURE TYPE IMMUNOLOGIC: CPT

## 2023-08-02 PROCEDURE — 86901 BLOOD TYPING SEROLOGIC RH(D): CPT

## 2023-08-02 PROCEDURE — 93010 ELECTROCARDIOGRAM REPORT: CPT | Performed by: INTERNAL MEDICINE

## 2023-08-02 PROCEDURE — 85025 COMPLETE CBC W/AUTO DIFF WBC: CPT

## 2023-08-02 PROCEDURE — 85730 THROMBOPLASTIN TIME PARTIAL: CPT

## 2023-08-02 PROCEDURE — 36415 COLL VENOUS BLD VENIPUNCTURE: CPT

## 2023-08-02 PROCEDURE — 86850 RBC ANTIBODY SCREEN: CPT

## 2023-08-02 PROCEDURE — 93005 ELECTROCARDIOGRAM TRACING: CPT

## 2023-08-02 PROCEDURE — 86900 BLOOD TYPING SEROLOGIC ABO: CPT

## 2023-08-02 PROCEDURE — 80053 COMPREHEN METABOLIC PANEL: CPT

## 2023-08-02 PROCEDURE — 85610 PROTHROMBIN TIME: CPT

## 2023-08-03 LAB
ATRIAL RATE: 88 BPM
P AXIS: 23 DEGREES
P-R INTERVAL: 158 MS
Q-T INTERVAL: 364 MS
QRS DURATION: 80 MS
QTC CALCULATION (BEZET): 440 MS
R AXIS: -48 DEGREES
T AXIS: 31 DEGREES
VENTRICULAR RATE: 88 BPM

## 2023-08-07 ENCOUNTER — OFFICE VISIT (OUTPATIENT)
Dept: INTERNAL MEDICINE CLINIC | Facility: CLINIC | Age: 73
End: 2023-08-07
Payer: MEDICARE

## 2023-08-07 ENCOUNTER — ORDER TRANSCRIPTION (OUTPATIENT)
Dept: PHYSICAL THERAPY | Facility: HOSPITAL | Age: 73
End: 2023-08-07

## 2023-08-07 VITALS
WEIGHT: 187 LBS | OXYGEN SATURATION: 98 % | BODY MASS INDEX: 31.92 KG/M2 | RESPIRATION RATE: 16 BRPM | SYSTOLIC BLOOD PRESSURE: 110 MMHG | TEMPERATURE: 98 F | DIASTOLIC BLOOD PRESSURE: 68 MMHG | HEIGHT: 64 IN | HEART RATE: 93 BPM

## 2023-08-07 DIAGNOSIS — R73.03 PREDIABETES: ICD-10-CM

## 2023-08-07 DIAGNOSIS — E89.0 POSTOPERATIVE HYPOTHYROIDISM: ICD-10-CM

## 2023-08-07 DIAGNOSIS — C78.6 PERITONEAL CARCINOMATOSIS (HCC): ICD-10-CM

## 2023-08-07 DIAGNOSIS — E78.2 MIXED HYPERLIPIDEMIA: ICD-10-CM

## 2023-08-07 DIAGNOSIS — Z86.711 HISTORY OF PULMONARY EMBOLISM: ICD-10-CM

## 2023-08-07 DIAGNOSIS — D50.8 OTHER IRON DEFICIENCY ANEMIA: ICD-10-CM

## 2023-08-07 DIAGNOSIS — M85.80 OSTEOPENIA, UNSPECIFIED LOCATION: ICD-10-CM

## 2023-08-07 DIAGNOSIS — I10 PRIMARY HYPERTENSION: ICD-10-CM

## 2023-08-07 DIAGNOSIS — M16.12 OSTEOARTHRITIS OF LEFT HIP: Primary | ICD-10-CM

## 2023-08-07 DIAGNOSIS — C79.89 SECONDARY MALIGNANT NEOPLASM OF SOFT TISSUES OF ABDOMEN (HCC): ICD-10-CM

## 2023-08-07 DIAGNOSIS — Z01.818 PREOP EXAM FOR INTERNAL MEDICINE: Primary | ICD-10-CM

## 2023-08-07 PROCEDURE — 99214 OFFICE O/P EST MOD 30 MIN: CPT | Performed by: INTERNAL MEDICINE

## 2023-08-07 NOTE — PATIENT INSTRUCTIONS
Good luck with your surgery.   Hopefully we can get off the amlodipine after your surgery    Please  the ointment from the pharmacy    Schedule a routine physical with me as well once you have recovered

## 2023-08-08 ENCOUNTER — TELEPHONE (OUTPATIENT)
Dept: ORTHOPEDICS CLINIC | Facility: CLINIC | Age: 73
End: 2023-08-08

## 2023-08-08 ENCOUNTER — ANESTHESIA EVENT (OUTPATIENT)
Dept: SURGERY | Facility: HOSPITAL | Age: 73
End: 2023-08-08
Payer: MEDICARE

## 2023-08-08 NOTE — TELEPHONE ENCOUNTER
Mupirocin discussed and medication education given. She verbalized understanding at this time and was able to verbalize back instructions. All questions answered.

## 2023-08-08 NOTE — TELEPHONE ENCOUNTER
----- Message from Geraldo Rodriguez MD sent at 8/4/2023  9:31 PM CDT -----  Results reviewed. Please inform patient that MSSA was positive and advise on decolonization protocol. Mupirocin has been sent to her pharmacy. Thank you.

## 2023-08-09 ENCOUNTER — TELEPHONE (OUTPATIENT)
Dept: PHYSICAL THERAPY | Facility: HOSPITAL | Age: 73
End: 2023-08-09

## 2023-08-09 ENCOUNTER — HOSPITAL ENCOUNTER (OUTPATIENT)
Dept: GENERAL RADIOLOGY | Age: 73
Discharge: HOME | End: 2023-08-09
Attending: ORTHOPAEDIC SURGERY
Payer: MEDICARE

## 2023-08-09 ENCOUNTER — OFFICE VISIT (OUTPATIENT)
Dept: ORTHOPEDICS CLINIC | Facility: CLINIC | Age: 73
End: 2023-08-09
Payer: MEDICARE

## 2023-08-09 VITALS — BODY MASS INDEX: 31.89 KG/M2 | WEIGHT: 180 LBS | HEIGHT: 63 IN

## 2023-08-09 DIAGNOSIS — M17.11 PRIMARY OSTEOARTHRITIS OF RIGHT KNEE: Primary | ICD-10-CM

## 2023-08-09 DIAGNOSIS — M16.12 PRIMARY OSTEOARTHRITIS OF LEFT HIP: ICD-10-CM

## 2023-08-09 PROCEDURE — 99213 OFFICE O/P EST LOW 20 MIN: CPT | Performed by: PHYSICIAN ASSISTANT

## 2023-08-09 PROCEDURE — 1125F AMNT PAIN NOTED PAIN PRSNT: CPT | Performed by: PHYSICIAN ASSISTANT

## 2023-08-09 PROCEDURE — 73502 X-RAY EXAM HIP UNI 2-3 VIEWS: CPT | Performed by: ORTHOPAEDIC SURGERY

## 2023-08-09 PROCEDURE — 20610 DRAIN/INJ JOINT/BURSA W/O US: CPT | Performed by: PHYSICIAN ASSISTANT

## 2023-08-09 RX ORDER — TRIAMCINOLONE ACETONIDE 40 MG/ML
40 INJECTION, SUSPENSION INTRA-ARTICULAR; INTRAMUSCULAR ONCE
Status: COMPLETED | OUTPATIENT
Start: 2023-08-09 | End: 2023-08-09

## 2023-08-09 RX ADMIN — TRIAMCINOLONE ACETONIDE 40 MG: 40 INJECTION, SUSPENSION INTRA-ARTICULAR; INTRAMUSCULAR at 15:18:00

## 2023-08-09 NOTE — PROGRESS NOTES
[facility] Progress Note    Subjective: Patient returns to clinic after previously being seen for left knee and left hip osteoarthritis. She is scheduled to undergo left posterior cemented total hip arthroplasty on 8/24/2023. She presents to clinic with right knee pain. History she has never received injections into the right knee. Pain is mostly located along the medial aspect of the knee. Objective: Patient seated comfortably in a wheelchair. Alert and oriented x 3. Nonlabored breathing. Gross neurologically intact. Right knee flexion limited to only about 60 degrees and extension is full to 0 degrees. There is tenderness palpation along the medial joint line. Imaging: Radiographs of the right knee personally viewed, independently interpreted and radiology report read. Radiographs obtained in June 2023 demonstrates severe medial compartment joint space narrowing with osteophyte formation noted. Assessment/Plan: 70-year-old female with symptomatic radiographically severe right knee osteoarthritis. She has previously experienced great relief with left knee steroid injection and expresses interest in receiving a right knee steroid injection in clinic today. I administered a cortisone injection into the right knee in clinic today, please see separate procedure note for additional details. I explained the risks of the injection with the patient including failure to relieve pain and possible cartilage wear with chronic use of injections. I explained to the patient that the cortisone may take 2-3 days to take effect. I explained that the injection could be repeated at least every 3 months as long as it continues to provide relief of symptoms. Will plan on following up with her for left hip surgery on 8/24/2023. Her questions were sought and answered satisfactorily. She is happy with the plan will follow as advised.       Ulisses Hurtado PA-C  [facility] Orthopedic Surgery    This note was dictated using [address] [place]. While it was briefly proofread prior to completion, some grammatical, spelling, and word choice errors due to dictation may still occur.

## 2023-08-09 NOTE — PROCEDURES
After informed consent, the patient's right knee was marked, locally anesthetized with skin refrigerant, prepped with topical antiseptic, and injected with a mixture of 1mL 40mg/mL Kenalog, 2mL 1% lidocaine and 2mL 0.5% marcaine through the inferolateral portal.  A band-aid was applied. The patient tolerated the procedure well.     Nuzhat Johnson PA-C  [address] [facility] Orthopedic Surgery

## 2023-08-10 ENCOUNTER — TELEPHONE (OUTPATIENT)
Dept: PHYSICAL THERAPY | Facility: HOSPITAL | Age: 73
End: 2023-08-10

## 2023-08-10 NOTE — TELEPHONE ENCOUNTER
PRE OP CLEARANCE COMPLETED ON 8/7/23 IN [facility] ENTERED     X RAY MAPPING INFO SENT

## 2023-08-24 ENCOUNTER — APPOINTMENT (OUTPATIENT)
Dept: GENERAL RADIOLOGY | Facility: HOSPITAL | Age: 73
End: 2023-08-24
Attending: PHYSICIAN ASSISTANT
Payer: MEDICARE

## 2023-08-24 ENCOUNTER — HOSPITAL ENCOUNTER (INPATIENT)
Facility: HOSPITAL | Age: 73
LOS: 1 days | Discharge: HOME | End: 2023-08-25
Attending: ORTHOPAEDIC SURGERY | Admitting: ORTHOPAEDIC SURGERY
Payer: MEDICARE

## 2023-08-24 ENCOUNTER — ANESTHESIA (OUTPATIENT)
Dept: SURGERY | Facility: HOSPITAL | Age: 73
End: 2023-08-24
Payer: MEDICARE

## 2023-08-24 ENCOUNTER — HOSPITAL ENCOUNTER (INPATIENT)
Facility: HOSPITAL | Age: 73
LOS: 1 days | Discharge: HOME HEALTH | End: 2023-08-25
Attending: ORTHOPAEDIC SURGERY | Admitting: ORTHOPAEDIC SURGERY
Payer: MEDICARE

## 2023-08-24 DIAGNOSIS — M16.12 OSTEOARTHRITIS OF LEFT HIP: Primary | ICD-10-CM

## 2023-08-24 DIAGNOSIS — M16.12 PRIMARY OSTEOARTHRITIS OF LEFT HIP: ICD-10-CM

## 2023-08-24 PROBLEM — Z96.642 STATUS POST TOTAL REPLACEMENT OF LEFT HIP: Status: ACTIVE | Noted: 2023-08-24

## 2023-08-24 PROBLEM — I82.90 VTE (VENOUS THROMBOEMBOLISM): Status: ACTIVE | Noted: 2023-08-24

## 2023-08-24 PROCEDURE — 0SRB039 REPLACEMENT OF LEFT HIP JOINT WITH CERAMIC SYNTHETIC SUBSTITUTE, CEMENTED, OPEN APPROACH: ICD-10-PCS | Performed by: ORTHOPAEDIC SURGERY

## 2023-08-24 PROCEDURE — 99223 1ST HOSP IP/OBS HIGH 75: CPT | Performed by: INTERNAL MEDICINE

## 2023-08-24 PROCEDURE — 72170 X-RAY EXAM OF PELVIS: CPT | Performed by: PHYSICIAN ASSISTANT

## 2023-08-24 DEVICE — IMPLANTABLE DEVICE: Type: IMPLANTABLE DEVICE | Site: HIP | Status: FUNCTIONAL

## 2023-08-24 DEVICE — IMPLANTABLE DEVICE
Type: IMPLANTABLE DEVICE | Site: HIP | Status: FUNCTIONAL
Brand: VERSYS®

## 2023-08-24 DEVICE — IMPLANTABLE DEVICE
Type: IMPLANTABLE DEVICE | Site: HIP | Status: FUNCTIONAL
Brand: G7® ACETABULAR SYSTEM

## 2023-08-24 DEVICE — FULL DOSE BONE CEMENT, 10 PACK CATALOG NUMBER IS 6191-1-010
Type: IMPLANTABLE DEVICE | Site: HIP | Status: FUNCTIONAL
Brand: SIMPLEX

## 2023-08-24 DEVICE — IMPLANTABLE DEVICE
Type: IMPLANTABLE DEVICE | Site: HIP | Status: FUNCTIONAL
Brand: QUIK-USE®

## 2023-08-24 DEVICE — BIOLOX® DELTA, CERAMIC FEMORAL HEAD, M, Ø 40/0, TAPER 12/14
Type: IMPLANTABLE DEVICE | Site: HIP | Status: FUNCTIONAL
Brand: BIOLOX® DELTA

## 2023-08-24 DEVICE — IMPLANTABLE DEVICE
Type: IMPLANTABLE DEVICE | Site: HIP | Status: FUNCTIONAL
Brand: G7® VIVACIT-E®

## 2023-08-24 DEVICE — IMPLANTABLE DEVICE
Type: IMPLANTABLE DEVICE | Site: HIP | Status: FUNCTIONAL
Brand: VERSYS® ADVOCATE®

## 2023-08-24 RX ORDER — HYDROMORPHONE HYDROCHLORIDE 1 MG/ML
0.4 INJECTION, SOLUTION INTRAMUSCULAR; INTRAVENOUS; SUBCUTANEOUS EVERY 2 HOUR PRN
Status: DISCONTINUED | OUTPATIENT
Start: 2023-08-24 | End: 2023-08-25

## 2023-08-24 RX ORDER — POLYETHYLENE GLYCOL 3350 17 G/17G
17 POWDER, FOR SOLUTION ORAL DAILY PRN
Status: DISCONTINUED | OUTPATIENT
Start: 2023-08-24 | End: 2023-08-25

## 2023-08-24 RX ORDER — METOCLOPRAMIDE HYDROCHLORIDE 5 MG/ML
10 INJECTION INTRAMUSCULAR; INTRAVENOUS EVERY 8 HOURS PRN
Status: DISCONTINUED | OUTPATIENT
Start: 2023-08-24 | End: 2023-08-24 | Stop reason: HOSPADM

## 2023-08-24 RX ORDER — CEFAZOLIN SODIUM/WATER 2 G/20 ML
2 SYRINGE (ML) INTRAVENOUS ONCE
Status: COMPLETED | OUTPATIENT
Start: 2023-08-24 | End: 2023-08-24

## 2023-08-24 RX ORDER — CEFAZOLIN SODIUM/WATER 2 G/20 ML
2 SYRINGE (ML) INTRAVENOUS EVERY 8 HOURS
Status: COMPLETED | OUTPATIENT
Start: 2023-08-24 | End: 2023-08-25

## 2023-08-24 RX ORDER — HYDROMORPHONE HYDROCHLORIDE 1 MG/ML
0.4 INJECTION, SOLUTION INTRAMUSCULAR; INTRAVENOUS; SUBCUTANEOUS EVERY 5 MIN PRN
Status: DISCONTINUED | OUTPATIENT
Start: 2023-08-24 | End: 2023-08-24 | Stop reason: HOSPADM

## 2023-08-24 RX ORDER — ACETAMINOPHEN 325 MG/1
650 TABLET ORAL 4 TIMES DAILY
Status: DISCONTINUED | OUTPATIENT
Start: 2023-08-24 | End: 2023-08-25

## 2023-08-24 RX ORDER — METOCLOPRAMIDE HYDROCHLORIDE 5 MG/ML
10 INJECTION INTRAMUSCULAR; INTRAVENOUS EVERY 8 HOURS PRN
Status: DISCONTINUED | OUTPATIENT
Start: 2023-08-24 | End: 2023-08-25

## 2023-08-24 RX ORDER — LABETALOL HYDROCHLORIDE 5 MG/ML
5 INJECTION, SOLUTION INTRAVENOUS EVERY 5 MIN PRN
Status: DISCONTINUED | OUTPATIENT
Start: 2023-08-24 | End: 2023-08-24 | Stop reason: HOSPADM

## 2023-08-24 RX ORDER — DIPHENHYDRAMINE HYDROCHLORIDE 50 MG/ML
12.5 INJECTION INTRAMUSCULAR; INTRAVENOUS EVERY 4 HOURS PRN
Status: DISCONTINUED | OUTPATIENT
Start: 2023-08-24 | End: 2023-08-25

## 2023-08-24 RX ORDER — MIDAZOLAM HYDROCHLORIDE 1 MG/ML
1 INJECTION INTRAMUSCULAR; INTRAVENOUS EVERY 5 MIN PRN
Status: DISCONTINUED | OUTPATIENT
Start: 2023-08-24 | End: 2023-08-24 | Stop reason: HOSPADM

## 2023-08-24 RX ORDER — NALOXONE HYDROCHLORIDE 0.4 MG/ML
80 INJECTION, SOLUTION INTRAMUSCULAR; INTRAVENOUS; SUBCUTANEOUS AS NEEDED
Status: DISCONTINUED | OUTPATIENT
Start: 2023-08-24 | End: 2023-08-24 | Stop reason: HOSPADM

## 2023-08-24 RX ORDER — ONDANSETRON 2 MG/ML
4 INJECTION INTRAMUSCULAR; INTRAVENOUS EVERY 6 HOURS PRN
Status: DISCONTINUED | OUTPATIENT
Start: 2023-08-24 | End: 2023-08-24 | Stop reason: HOSPADM

## 2023-08-24 RX ORDER — SENNOSIDES 8.6 MG
17.2 TABLET ORAL NIGHTLY
Status: DISCONTINUED | OUTPATIENT
Start: 2023-08-24 | End: 2023-08-25

## 2023-08-24 RX ORDER — DIPHENHYDRAMINE HYDROCHLORIDE 50 MG/ML
25 INJECTION INTRAMUSCULAR; INTRAVENOUS ONCE AS NEEDED
Status: ACTIVE | OUTPATIENT
Start: 2023-08-24 | End: 2023-08-24

## 2023-08-24 RX ORDER — BISACODYL 10 MG
10 SUPPOSITORY, RECTAL RECTAL
Status: DISCONTINUED | OUTPATIENT
Start: 2023-08-24 | End: 2023-08-25

## 2023-08-24 RX ORDER — ONDANSETRON 2 MG/ML
INJECTION INTRAMUSCULAR; INTRAVENOUS AS NEEDED
Status: DISCONTINUED | OUTPATIENT
Start: 2023-08-24 | End: 2023-08-24 | Stop reason: SURG

## 2023-08-24 RX ORDER — SODIUM CHLORIDE, SODIUM LACTATE, POTASSIUM CHLORIDE, CALCIUM CHLORIDE 600; 310; 30; 20 MG/100ML; MG/100ML; MG/100ML; MG/100ML
INJECTION, SOLUTION INTRAVENOUS CONTINUOUS
Status: DISCONTINUED | OUTPATIENT
Start: 2023-08-24 | End: 2023-08-25

## 2023-08-24 RX ORDER — MELATONIN
325
Status: DISCONTINUED | OUTPATIENT
Start: 2023-08-25 | End: 2023-08-25

## 2023-08-24 RX ORDER — CALCIUM CARBONATE 500 MG/1
1 TABLET, CHEWABLE ORAL
COMMUNITY

## 2023-08-24 RX ORDER — KETOROLAC TROMETHAMINE 15 MG/ML
15 INJECTION, SOLUTION INTRAMUSCULAR; INTRAVENOUS EVERY 6 HOURS
Status: COMPLETED | OUTPATIENT
Start: 2023-08-24 | End: 2023-08-25

## 2023-08-24 RX ORDER — TRAMADOL HYDROCHLORIDE 50 MG/1
50 TABLET ORAL EVERY 12 HOURS SCHEDULED
Status: DISCONTINUED | OUTPATIENT
Start: 2023-08-24 | End: 2023-08-25

## 2023-08-24 RX ORDER — DEXAMETHASONE SODIUM PHOSPHATE 10 MG/ML
8 INJECTION, SOLUTION INTRAMUSCULAR; INTRAVENOUS ONCE
Status: COMPLETED | OUTPATIENT
Start: 2023-08-25 | End: 2023-08-25

## 2023-08-24 RX ORDER — LEVOTHYROXINE SODIUM 0.1 MG/1
100 TABLET ORAL
Status: DISCONTINUED | OUTPATIENT
Start: 2023-08-25 | End: 2023-08-25

## 2023-08-24 RX ORDER — OXYCODONE HYDROCHLORIDE 5 MG/1
2.5 TABLET ORAL EVERY 4 HOURS PRN
Status: DISCONTINUED | OUTPATIENT
Start: 2023-08-24 | End: 2023-08-25

## 2023-08-24 RX ORDER — OXYCODONE HYDROCHLORIDE 5 MG/1
5 TABLET ORAL EVERY 4 HOURS PRN
Status: DISCONTINUED | OUTPATIENT
Start: 2023-08-24 | End: 2023-08-25

## 2023-08-24 RX ORDER — AMLODIPINE BESYLATE 5 MG/1
5 TABLET ORAL DAILY
Status: DISCONTINUED | OUTPATIENT
Start: 2023-08-25 | End: 2023-08-25

## 2023-08-24 RX ORDER — BUPIVACAINE HYDROCHLORIDE 7.5 MG/ML
INJECTION, SOLUTION INTRASPINAL AS NEEDED
Status: DISCONTINUED | OUTPATIENT
Start: 2023-08-24 | End: 2023-08-24 | Stop reason: SURG

## 2023-08-24 RX ORDER — SODIUM CHLORIDE, SODIUM LACTATE, POTASSIUM CHLORIDE, CALCIUM CHLORIDE 600; 310; 30; 20 MG/100ML; MG/100ML; MG/100ML; MG/100ML
INJECTION, SOLUTION INTRAVENOUS CONTINUOUS
Status: DISCONTINUED | OUTPATIENT
Start: 2023-08-24 | End: 2023-08-24 | Stop reason: HOSPADM

## 2023-08-24 RX ORDER — TRANEXAMIC ACID 10 MG/ML
1000 INJECTION, SOLUTION INTRAVENOUS ONCE
Status: COMPLETED | OUTPATIENT
Start: 2023-08-24 | End: 2023-08-24

## 2023-08-24 RX ORDER — SODIUM CHLORIDE 9 MG/ML
INJECTION, SOLUTION INTRAVENOUS CONTINUOUS
Status: DISCONTINUED | OUTPATIENT
Start: 2023-08-24 | End: 2023-08-24 | Stop reason: HOSPADM

## 2023-08-24 RX ORDER — DOCUSATE SODIUM 100 MG/1
100 CAPSULE, LIQUID FILLED ORAL 2 TIMES DAILY
Status: DISCONTINUED | OUTPATIENT
Start: 2023-08-24 | End: 2023-08-25

## 2023-08-24 RX ORDER — LETROZOLE 2.5 MG/1
2.5 TABLET, FILM COATED ORAL DAILY
Status: DISCONTINUED | OUTPATIENT
Start: 2023-08-25 | End: 2023-08-25

## 2023-08-24 RX ORDER — ACETAMINOPHEN 500 MG
1000 TABLET ORAL ONCE
Status: DISCONTINUED | OUTPATIENT
Start: 2023-08-24 | End: 2023-08-24 | Stop reason: HOSPADM

## 2023-08-24 RX ORDER — ENEMA 19; 7 G/133ML; G/133ML
1 ENEMA RECTAL ONCE AS NEEDED
Status: DISCONTINUED | OUTPATIENT
Start: 2023-08-24 | End: 2023-08-25

## 2023-08-24 RX ORDER — ACETAMINOPHEN 325 MG/1
650 TABLET ORAL ONCE
Status: COMPLETED | OUTPATIENT
Start: 2023-08-24 | End: 2023-08-24

## 2023-08-24 RX ORDER — HYDROMORPHONE HYDROCHLORIDE 1 MG/ML
0.2 INJECTION, SOLUTION INTRAMUSCULAR; INTRAVENOUS; SUBCUTANEOUS EVERY 5 MIN PRN
Status: DISCONTINUED | OUTPATIENT
Start: 2023-08-24 | End: 2023-08-24 | Stop reason: HOSPADM

## 2023-08-24 RX ORDER — KETAMINE HYDROCHLORIDE 50 MG/ML
INJECTION, SOLUTION, CONCENTRATE INTRAMUSCULAR; INTRAVENOUS AS NEEDED
Status: DISCONTINUED | OUTPATIENT
Start: 2023-08-24 | End: 2023-08-24 | Stop reason: SURG

## 2023-08-24 RX ORDER — MEPERIDINE HYDROCHLORIDE 25 MG/ML
12.5 INJECTION INTRAMUSCULAR; INTRAVENOUS; SUBCUTANEOUS AS NEEDED
Status: DISCONTINUED | OUTPATIENT
Start: 2023-08-24 | End: 2023-08-24 | Stop reason: HOSPADM

## 2023-08-24 RX ORDER — DIPHENHYDRAMINE HCL 25 MG
25 CAPSULE ORAL EVERY 4 HOURS PRN
Status: DISCONTINUED | OUTPATIENT
Start: 2023-08-24 | End: 2023-08-25

## 2023-08-24 RX ORDER — ACETAMINOPHEN 325 MG/1
TABLET ORAL
Status: COMPLETED
Start: 2023-08-24 | End: 2023-08-24

## 2023-08-24 RX ORDER — ONDANSETRON 2 MG/ML
4 INJECTION INTRAMUSCULAR; INTRAVENOUS EVERY 6 HOURS PRN
Status: DISCONTINUED | OUTPATIENT
Start: 2023-08-24 | End: 2023-08-25

## 2023-08-24 RX ORDER — DEXAMETHASONE SODIUM PHOSPHATE 4 MG/ML
VIAL (ML) INJECTION AS NEEDED
Status: DISCONTINUED | OUTPATIENT
Start: 2023-08-24 | End: 2023-08-24 | Stop reason: SURG

## 2023-08-24 RX ORDER — HYDROMORPHONE HYDROCHLORIDE 1 MG/ML
0.6 INJECTION, SOLUTION INTRAMUSCULAR; INTRAVENOUS; SUBCUTANEOUS EVERY 5 MIN PRN
Status: DISCONTINUED | OUTPATIENT
Start: 2023-08-24 | End: 2023-08-24 | Stop reason: HOSPADM

## 2023-08-24 RX ORDER — LIDOCAINE HYDROCHLORIDE 10 MG/ML
INJECTION, SOLUTION EPIDURAL; INFILTRATION; INTRACAUDAL; PERINEURAL AS NEEDED
Status: DISCONTINUED | OUTPATIENT
Start: 2023-08-24 | End: 2023-08-24 | Stop reason: SURG

## 2023-08-24 RX ORDER — HYDROMORPHONE HYDROCHLORIDE 1 MG/ML
0.2 INJECTION, SOLUTION INTRAMUSCULAR; INTRAVENOUS; SUBCUTANEOUS EVERY 2 HOUR PRN
Status: DISCONTINUED | OUTPATIENT
Start: 2023-08-24 | End: 2023-08-25

## 2023-08-24 RX ORDER — ENOXAPARIN SODIUM 100 MG/ML
40 INJECTION SUBCUTANEOUS DAILY
Status: DISCONTINUED | OUTPATIENT
Start: 2023-08-25 | End: 2023-08-25

## 2023-08-24 RX ADMIN — LIDOCAINE HYDROCHLORIDE 50 MG: 10 INJECTION, SOLUTION EPIDURAL; INFILTRATION; INTRACAUDAL; PERINEURAL at 10:57:00

## 2023-08-24 RX ADMIN — KETAMINE HYDROCHLORIDE 25 MG: 50 INJECTION, SOLUTION, CONCENTRATE INTRAMUSCULAR; INTRAVENOUS at 12:00:00

## 2023-08-24 RX ADMIN — ONDANSETRON 4 MG: 2 INJECTION INTRAMUSCULAR; INTRAVENOUS at 13:07:00

## 2023-08-24 RX ADMIN — SODIUM CHLORIDE, SODIUM LACTATE, POTASSIUM CHLORIDE, CALCIUM CHLORIDE: 600; 310; 30; 20 INJECTION, SOLUTION INTRAVENOUS at 10:47:00

## 2023-08-24 RX ADMIN — DEXAMETHASONE SODIUM PHOSPHATE 4 MG: 4 MG/ML VIAL (ML) INJECTION at 11:02:00

## 2023-08-24 RX ADMIN — TRANEXAMIC ACID 1000 MG: 10 INJECTION, SOLUTION INTRAVENOUS at 11:01:00

## 2023-08-24 RX ADMIN — BUPIVACAINE HYDROCHLORIDE 1.8 ML: 7.5 INJECTION, SOLUTION INTRASPINAL at 10:55:00

## 2023-08-24 RX ADMIN — CEFAZOLIN SODIUM/WATER 2 G: 2 G/20 ML SYRINGE (ML) INTRAVENOUS at 11:06:00

## 2023-08-24 RX ADMIN — SODIUM CHLORIDE, SODIUM LACTATE, POTASSIUM CHLORIDE, CALCIUM CHLORIDE: 600; 310; 30; 20 INJECTION, SOLUTION INTRAVENOUS at 13:13:00

## 2023-08-24 NOTE — PHYSICAL THERAPY NOTE
PHYSICAL THERAPY JOINT EVALUATION - INPATIENT     Room Number: 355/355-A  Evaluation Date: 8/24/2023  Type of Evaluation: Initial  Physician Order: PT Eval and Treat    Presenting Problem: s/p L DUKE  Co-Morbidities : h/o CA  Reason for Therapy: Mobility Dysfunction and Discharge Planning    History related to current admission: Patient is a 68year old female admitted on 8/24/2023 for L DUKE. ASSESSMENT   In this PT evaluation, the patient presents with the following impairments decreased ROM, activity tolerance. These impairments and comorbidities manifest themselves as functional limitations in independent bed mobility, transfers, and gait. The patient is below baseline and would benefit from skilled inpatient PT to address the above deficits to assist patient in returning to prior to level of function. Functional outcome measures completed include [facility]. The AM-PAC '6-Clicks' Inpatient Basic Mobility Short Form was completed and this patient is demonstrating a Approx Degree of Impairment: 28.97%  degree of impairment in mobility. Research supports that patients with this level of impairment may benefit from [facility]. Jan Salazar DISCHARGE RECOMMENDATIONS  PT Discharge Recommendations: Home with home health PT    PLAN  PT Treatment Plan: Bed mobility; Body mechanics; Energy conservation; Endurance; Patient education; Family education;Gait training;Strengthening;Range of motion;Stoop training;Transfer training;Balance training  Rehab Potential : Good  Frequency (Obs): Daily  Number of Visits to Meet Established Goals: 2      Goal #1  Patient is able to demonstrate supine - sit EOB @ level: supervision     Goal #2  Patient is able to demonstrate transfers Sit to/from Stand at assistance level: supervison       Goal #3    Patient is able to ambulate 150 feet with assistive device at assistance level: supervision   Goal #4    Patient will negotiate 4 stairs/one curb w/ assistive device and supervision   Goal #5    Patient verbalizes and/or demonstrates all precautions and safety concerns independently    Goal #6      Goal Comments: Goals established on 2023    HOME SITUATION  Type of Home: House   Home Layout: Two level        Stairs to Bedroom: 10  Railing: Yes    Lives With: Alone     Patient Owned Equipment: Rolling walker       Prior Level of [place]: Pt reports mod ind PTA. Pt reports was taking stairs 1 at a time, however able to continue to complete them. SUBJECTIVE  \"The doctor told my sister I MIGHT go home tomorrow! \"      OBJECTIVE  Precautions: Bed/chair alarm;DUKE - posterior  Fall Risk: Standard fall risk    WEIGHT BEARING RESTRICTION  Weight Bearing Restriction: L lower extremity           L Lower Extremity: Weight Bearing as Tolerated    PAIN ASSESSMENT  Ratin          COGNITION  Following Commands:  follows all commands and directions without difficulty    RANGE OF MOTION AND STRENGTH ASSESSMENT  Upper extremity ROM and strength are within functional limits     Lower extremity ROM is within functional limits except L hip consistent with DUKE this date. Lower extremity strength is within functional limits except L hip consistent with DUKE this date. BALANCE                ADDITIONAL TESTS                                    ACTIVITY TOLERANCE                         O2 WALK       NEUROLOGICAL FINDINGS                        AM-PAC '6-Clicks' INPATIENT SHORT FORM - BASIC MOBILITY  How much difficulty does the patient currently have. .. Patient Difficulty: Turning over in bed (including adjusting bedclothes, sheets and blankets)?: None   Patient Difficulty: Sitting down on and standing up from a chair with arms (e.g., wheelchair, bedside commode, etc.): None   Patient Difficulty: Moving from lying on back to sitting on the side of the bed?: None   How much help from another person does the patient currently need. ..    Help from Another: Moving to and from a bed to a chair (including a wheelchair)?: A Little   Help from Another: Need to walk in hospital room?: A Little   Help from Another: Climbing 3-5 steps with a railing?: A Little       AM-PAC Score:  Raw Score: 21   Approx Degree of Impairment: 28.97%   Standardized Score (AM-PAC Scale): 50.25   CMS Modifier (G-Code): CJ    FUNCTIONAL ABILITY STATUS  Gait Assessment   Functional Mobility/Gait Assessment  Gait Assistance: Contact guard assist  Distance (ft): 2  Assistive Device: Rolling walker  Pattern: Shuffle    Skilled Therapy Provided    Bed Mobility:  Rolling: mod ind  Supine to sit: mod ind   Sit to supine: NT     Transfer Mobility:  Sit to stand: CGA- cues to avoid bending >90 degrees. Stand to sit: CGA  Gait = Bed to chair only as pt with copious amounts of urine. Therapist's Comments: Pt assisted with transfer- noted not feeling urination at this time. Assisted to chair. RN and PCT aware of need for full bedding change as well as cleaning up pt. Exercise/Education Provided:  Bed mobility  Body mechanics  Energy conservation  Functional activity tolerated  Gait training  Posture  Transfer training    Patient End of Session: Up in chair;Needs met;Call light within reach;RN aware of session/findings; All patient questions and concerns addressed; Alarm set; Discussed recommendations with /    Patient Evaluation Complexity Level:  History Low - no personal factors and/or co-morbidities   Examination of body systems Low - addressing 1-2 elements   Clinical Presentation Low - Stable   Clinical Decision Making Low Complexity       PT Session Time: 20 minutes    Therapeutic Activity: 12 minutes

## 2023-08-24 NOTE — PLAN OF CARE
Patient alert and oriented x4. VSS on RA. Pain controlled with scheduled meds. Denies n/t. SCDs in place, ankle pumps encouraged, IS encouraged. Aquacel dressing to left hip, CDI. Spandagrap in place. Pt up x1 with the walker. Tolerating diet, no c/o n/v. Voiding freely.      Plan: PT/OT, pain control

## 2023-08-24 NOTE — ANESTHESIA PROCEDURE NOTES
Spinal Block    Date/Time: 8/24/2023 10:50 AM    Performed by: Deng Head MD  Authorized by: Deng Head MD      General Information and Staff    Start Time:  8/24/2023 10:50 AM  End Time:  8/24/2023 10:55 AM  Anesthesiologist:  Deng Head MD  Performed by: Anesthesiologist  Patient Location:  OR  Site identification: surface landmarks    Preanesthetic Checklist: patient identified, IV checked, risks and benefits discussed, monitors and equipment checked, pre-op evaluation, timeout performed, anesthesia consent and sterile technique used      Procedure Details    Patient Position:  Sitting  Prep: ChloraPrep    Monitoring:  Cardiac monitor, heart rate and continuous pulse ox  Approach:  Midline  Location:  L3-4  Injection Technique:  Single-shot    Needle    Needle Type:  Sprotte  Needle Gauge:  22 G  Needle Length:  3.5 in    Assessment    Sensory Level:   Events: clear CSF, CSF aspirated, well tolerated and blood negative      Additional Comments       In sitting position, patient prepped and draped in standard sterile fashion; mask, hat, and sterile gloves worn; at the approximate L3-4 lumbar level, clear CSF obtained, 1.8 mL of bupivacaine 0.75% was injected without any paraesthesias. Patient tolerated procedure well, without any immediate complications. Adequate anesthetic level obtained prior to the incision.

## 2023-08-24 NOTE — INTERVAL H&P NOTE
Heart Palpitations    Palpitations are the feeling that your heart is beating hard, fast, or irregular. Some describe it as \"pounding\" or \"skipped beats.\" Palpitations may occur in persons with heart disease, but can also occur in healthy persons.  Heart-related causes:  · Arrhythmia (a change from the heart's normal rhythm)  · Disease of the heart valves  Non-heart-related causes:  · Certain medicines (such as asthma inhalers and decongestants)  · Some herbal supplements, energy drinks and pills, and weight loss pills  · Illegal stimulant drugs (such as cocaine, crank, methamphetamine, PCP, bath salts, ecstasy)  · Caffeine, alcohol, and tobacco  · Medical conditions such as thyroid disease, anemia, anxiety, and panic disorder  Sometimes the cause cannot be found.  Home care  Follow these home care tips:  1. Avoid excess caffeine, alcohol, tobacco, and any stimulant drugs.  2. Tell your doctor about any prescription or over-the-counter or herbal medicines you take.  Follow-up care  Follow up with your doctor or as advised by our staff.  Call 911  This is the fastest and safest way to get to the emergency department. The paramedics can also begin treatment on the way to the hospital, if needed.  Don't wait until your symptoms are severe to call 911. These are reasons to call 911:  · Chest pain  · Shortness of breath  · Feeling lightheaded, faint, or dizzy  · Fainting or loss of consciousness  · Very irregular heartbeat  · Rapid heart beat  · Slower than usual heart rate  · Chest pain with weakness, dizziness, heavy sweating, nausea, or vomiting  · Extreme drowsiness or confusion  · Weakness of an arm or leg, or on 1 side of the face  · Difficulty with speech or vision  When to seek medical care  Get prompt medical attention if you have palpitations and any of the following:  · Weakness  · Dizziness  · Lightheadedness  © 4814-0292 [employer]. [address] [place] [address], [place] [address]. All rights  Pre-op Diagnosis: Primary osteoarthritis of left hip [M16.12]    The above referenced H&P was reviewed by Pura Murray MD on 8/24/2023, the patient was examined and no significant changes have occurred in the patient's condition since the H&P was performed. I discussed with the patient and/or legal representative the potential benefits, risks and side effects of this procedure; the likelihood of the patient achieving goals; and potential problems that might occur during recuperation. I discussed reasonable alternatives to the procedure, including risks, benefits and side effects related to the alternatives and risks related to not receiving this procedure. We will proceed with procedure as planned. reserved. This information is not intended as a substitute for professional medical care. Always follow your healthcare professional's instructions.

## 2023-08-24 NOTE — OPERATIVE REPORT
Operative Note    Patient Name/: Connor Bone 6/3/1950  Date: 2023  Location: [facility]  Preoperative Diagnosis: Left hip osteoarthritis  Postoperative Diagnosis: Same  Operation: Posterior cemented left total hip arthroplasty  Primary Surgeon: Alirio No  Assistant:  ROE Brown first assist.  Rosita Girard also assisted in this case. Please note a skilled surgical assistant was necessary for this case in order to assist with patient positioning, prepping, draping, incision, exposure, implant placement, wound closure. Indications: 66-year-old female with end-stage left hip osteoarthritis failed nonsurgical treatment  Surgical Findings: End-stage left hip OA  Operative Report: After informed consent, the left lower extremity was marked. Patient was brought to the operating room where spinal anesthesia was induced. Patient was placed in lateral decubitus position with a peg board, with all bony prominences padded. The left lower extremity was prepped and draped in sterile fashion. Timeout was performed to verify correct surgical site and procedure. 2 g of Ancef was given within 1 hour of incision. Additionally, 1 g tranexamic acid was given intravenously. Next the limb lengths were measured, 1-1/4 finger breaths short at the heel and 3 mm long at the tibial tubercle. A posterior approach incision was performed, through skin and subcutaneous tissue down to the fascia and IT band. Superficial bleeders were coagulated. Incision was performed through the IT band distally, in line with the center of the femur, and carried proximally over the posterior superior aspect of the greater trochanter in line with the fibers of the gluteus codie. Gluteus codie was split using Bovie and digital separation. A deep Charnley retractor was placed. The limb was then internally rotated, the gluteus medius was identified, and a blunt cobra retractor was placed underneath.   Ariadna Krishnan was used to identify the gluteus minimus next to the piriformis, which were  followed by placement of the blunt cobra underneath the gluteus minimus. Periarticular pain cocktail was injected into the capsule, piriformis and short external rotators. A full-thickness capsulotomy was performed, through capsule and along the superior aspect of the piriformis up to its insertion on the greater trochanter. This was carried down along the posterior aspect of the femur. The piriformis and posterior capsule were tagged for later repair. Next the hip was dislocated, and the neck cut was made at 20 mm according to the preoperative template. Lesser to center distance was unable to be measured due to the deformity of the femoral head. The neck cut was performed and the head was removed. Head measured around 49 mm. Anterior and posterior acetabular retractors were replaced, and the acetabulum was exposed. Remaining labrum was removed from around the rim, as well as pulvinar from the base of the acetabulum. I began reaming, starting with a 47 reamer to medialize followed by reaming for position, and reamed by 2 up to a 53 when I had good circumferential fit with bleeding bone anterior, posterior and superior. A size 54 cup was then placed, using anatomic landmarks as well as the A-frame alignment guide aiming for 40 degrees abduction and 20 degrees anteversion. The cup was impacted down to the bone, and was seated with good coverage both anterior and posterior. 3 screws were placed in the posterior superior quadrant. A 40 mm inner diameter liner was impacted into place. Next, the hip was flexed and internally rotated to expose the proximal femur. Soft tissue was removed from the medial aspect of the greater trochanter. A box cutting osteotome was used to remove remaining lateral femoral neck. Charnley awl was used to localize the femoral canal, followed by lateralizing reamer. I then began broaching for a Advocate stem.   I broached up to a size 17 which gave a good fit and fill. There was a change in pitch with impacting the broach, as well as no further sinking of the broach. It had good axial control as well. We trialed with a standard neck and +0 head. Following reduction of the hip, leg lengths felt to be appropriate, with appropriate restoration of length based on our preoperative template and preoperative leg length measurement. Tibial tubercle measurement was about 13-14 millimeters long for lengthening of around a centimeter. The hip was stable in full extension with 90 degrees external rotation of the foot, 45 degrees of hip flexion with 20 degrees adduction and 50 degrees internal rotation, and at 90 degrees hip flexion the hip could be internally rotated 40 prior to impingement, 45 degrees prior to any subluxation. The hip was dislocated, head and neck removed, and broach handle reattached. The stem was stable. The size 17 stem was opened, and the femoral canal was prepared for cementing. A cement restrictor was placed approximately 1cm distal to the measured position of the stem tip. The canal was irrigated and dried, followed by placement of epinephrine solution-soaked packing gauze. Cement was mixed and when it reached the appropriate doughy phase, packing was removed and the cement was retrograde injected down the canal with a cement gun, followed by finger pressurization. The stem was inserted in the appropriate anteversion related to the trial.  The stem was held in place until the cement hardened. Leg lengths and stability exam were retested with the +0 head, and confirmed same exam as with trials. The hip was dislocated, trial head was removed, trunnion was washed and dried, and the final head was impacted into place. The cup was washed and suctioned, and the hip was reduced. Betadine lavage was performed.   The posterior capsule and piriformis were repaired using nonabsorbable suture through drill holes in the posterior greater trochanter. Closure was performed with 2 Ethibond for fascia, 0 Vicryl for deep fat, 2-0 Vicryl for skin, followed by a running 4-0 Monocryl. Dermabond and Steri-Strips were applied, and Aquacel dressing was placed. A hip abductor pillow was placed, and the patient was brought to PACU in good condition.   Anesthesia: Spinal  Complications: None  Specimens: Femoral head  Blood loss: 150 mL  Fluids: See anesthesia report  Implants: [employer] Advocate cemented stem size 17 standard offset, 40 mm +0 ceramic head, 54 mm G7  Drains: None  Condition: Good  Disposition: Floor    -Weightbearing as tolerated, posterior hip precautions, PT OT  -DVT prophylaxis mechanical plus Lovenox for 6 weeks per hematology recommendations  -Pain control with oral meds  -Perioperative Ancef, 1 week Keflex postop due to positive MSSA    Evens Corrales MD, [address] Orthopedic Surgery  Phone [phone]  Fax [phone]

## 2023-08-25 VITALS
RESPIRATION RATE: 18 BRPM | HEIGHT: 64 IN | HEART RATE: 89 BPM | TEMPERATURE: 98 F | DIASTOLIC BLOOD PRESSURE: 51 MMHG | BODY MASS INDEX: 30.73 KG/M2 | WEIGHT: 180 LBS | SYSTOLIC BLOOD PRESSURE: 128 MMHG | OXYGEN SATURATION: 98 %

## 2023-08-25 PROCEDURE — 99024 POSTOP FOLLOW-UP VISIT: CPT | Performed by: ORTHOPAEDIC SURGERY

## 2023-08-25 PROCEDURE — 99232 SBSQ HOSP IP/OBS MODERATE 35: CPT | Performed by: INTERNAL MEDICINE

## 2023-08-25 RX ORDER — PSEUDOEPHEDRINE HCL 30 MG
100 TABLET ORAL 2 TIMES DAILY PRN
Qty: 30 CAPSULE | Refills: 0 | Status: SHIPPED | OUTPATIENT
Start: 2023-08-25

## 2023-08-25 RX ORDER — ACETAMINOPHEN 500 MG
1000 TABLET ORAL EVERY 8 HOURS
Qty: 90 TABLET | Refills: 0 | Status: SHIPPED | OUTPATIENT
Start: 2023-08-25 | End: 2023-09-09

## 2023-08-25 RX ORDER — TRAMADOL HYDROCHLORIDE 50 MG/1
50 TABLET ORAL EVERY 12 HOURS SCHEDULED
Qty: 30 TABLET | Refills: 0 | Status: SHIPPED | OUTPATIENT
Start: 2023-08-25 | End: 2023-09-09

## 2023-08-25 RX ORDER — ENOXAPARIN SODIUM 100 MG/ML
40 INJECTION SUBCUTANEOUS DAILY
Qty: 16 ML | Refills: 0 | Status: SHIPPED | OUTPATIENT
Start: 2023-08-26 | End: 2023-08-25

## 2023-08-25 RX ORDER — OXYCODONE HYDROCHLORIDE 5 MG/1
TABLET ORAL EVERY 4 HOURS PRN
Qty: 30 TABLET | Refills: 0 | Status: SHIPPED | OUTPATIENT
Start: 2023-08-25

## 2023-08-25 NOTE — CM/SW NOTE
08/25/23 0800   CM/SW Referral Data   Referral Source Social Work (self-referral)   Reason for Referral Discharge planning   Informant EMR;Clinical Staff Member   Discharge Needs   Anticipated D/C needs Home health care       Patient is a 67 y/o woman admitted s/p DUKE. [facility]. [facility] at [facility]. No other [facility] needs/concerns identified at this time. / to remain available for support and/or discharge planning. HOME SITUATION per PT eval  Type of Home: House   Home Layout: Two level  Stairs to Bedroom: 10  Railing: Yes     Lives With: Alone  Patient Owned Equipment: Rolling walker     Prior Level of [place]: Pt reports mod ind PTA. Pt reports was taking stairs 1 at a time, however able to continue to complete them.        Parvin Brennan LCSW  Discharge Planner  [phone]

## 2023-08-25 NOTE — PROGRESS NOTES
NURSING DISCHARGE NOTE    Discharged Home via Wheelchair. Accompanied by Support staff  Belongings Taken by patient/family. Patient cleared for discharge by ortho, hospitalist, and PT/OT. IV removed. Patient educated on all AVS discharge instructions by Moise Roy, all questions answered. Meds to beds delivered. Patient brought down to [facility] with all belongings by floor staff to be driven home by sister.

## 2023-08-25 NOTE — PROGRESS NOTES
[facility] Progress Note    Subjective: no issues overnight. Pain well controlled. Tolerating diet and cleared therapy    Objective: awake alert NAD  LLE NVI, dressing CDI      Assessment/Plan: 68year old female postop day #1 status post posterior L DUKE.   -Weightbearing as tolerated, posterior hip precautions, PT OT  -DVT prophylaxis mechanical plus Lovenox for 6 weeks per hematology recommendations - per patient she already has this medication at home so we will not prescribe any additional on discharge  -Pain control with oral meds  -Perioperative Ancef, 1 week Keflex postop due to positive MSSA  Disposition: dc home    Marcela King MD, [address] Orthopedic Surgery  Phone [phone]  Fax [phone]

## 2023-08-25 NOTE — PLAN OF CARE
Patient A&O X4 on RA. VSS, /IS. SCDs, Lovenox. Voiding freely via bathroom, LBM 8/24. Surgical incision to left hip covered with aquacel/spandigrip, c/d/i. Denies n/t. Pain controlled with PO medication. Gel ice as needed. WBAT. PT/OT. Up min assist w/ walker. Reminded to use call light. Plan to dc home when cleared. 0130: Lovenox teaching completed with patient. Return demonstration completed.

## 2023-08-25 NOTE — PHYSICAL THERAPY NOTE
PHYSICAL THERAPY TREATMENT NOTE - INPATIENT    Room Number: 355/355-A     Session: 1     Number of Visits to Meet Established Goals: 2    Presenting Problem: s/p L DUKE  Co-Morbidities : h/o CA  ASSESSMENT     Pt continues to present with impaired strength and decreased ROM LLE , decreased endurance and impaired balance below PLOF s/p L DUKE  . Pt will continue to benefit from ongoing PT to maximize functional independence. The AM-PAC '6-Clicks' Inpatient Basic Mobility Short Form was completed and this patient is demonstrating a 21% degree of impairment in mobility. Research supports that patients with this level of impairment may benefit from [address] DISCHARGE RECOMMENDATIONS  PT Discharge Recommendations: Home with home health PT     PLAN  PT Treatment Plan: Bed mobility; Body mechanics; Energy conservation; Endurance; Patient education; Family education;Gait training;Strengthening;Range of motion;Stoop training;Transfer training;Balance training  Rehab Potential : Good  Frequency (Obs): Daily    CURRENT GOALS        Goal #1  Patient is able to demonstrate supine - sit EOB @ level: supervision      Goal #2  Patient is able to demonstrate transfers Sit to/from Stand at assistance level: supervison         Goal #3     Patient is able to ambulate 150 feet with assistive device at assistance level: supervision   Goal #4     Patient will negotiate 4 stairs/one curb w/ assistive device and supervision   Goal #5     Patient verbalizes and/or demonstrates all precautions and safety concerns independently    Goal #6        Goal Comments: Goals established on 8/24/2023 8/25/2023 all goals adequate for d/c       SUBJECTIVE  \"I was hoping to go home today\"     OBJECTIVE  Precautions: Bed/chair alarm;DUKE - posterior    WEIGHT BEARING RESTRICTION  Weight Bearing Restriction: L lower extremity           L Lower Extremity: Weight Bearing as Tolerated    PAIN ASSESSMENT   Rating: Unable to rate  Location: incisional  Management Techniques: Activity promotion; Body mechanics;Breathing techniques;Relaxation;Repositioning    BALANCE                                                                                                                       Static Sitting: Good  Dynamic Sitting: Fair +           Static Standing: Fair -  Dynamic Standing: Fair -    ACTIVITY TOLERANCE                         O2 WALK         AM-PAC '6-Clicks' INPATIENT SHORT FORM - BASIC MOBILITY  How much difficulty does the patient currently have. .. Patient Difficulty: Turning over in bed (including adjusting bedclothes, sheets and blankets)?: None   Patient Difficulty: Sitting down on and standing up from a chair with arms (e.g., wheelchair, bedside commode, etc.): A Little   Patient Difficulty: Moving from lying on back to sitting on the side of the bed?: None   How much help from another person does the patient currently need. .. Help from Another: Moving to and from a bed to a chair (including a wheelchair)?: None   Help from Another: Need to walk in hospital room?: None   Help from Another: Climbing 3-5 steps with a railing?: A Little       AM-PAC Score:  Raw Score: 22   Approx Degree of Impairment: 20.91%   Standardized Score (AM-PAC Scale): 53.28   CMS Modifier (G-Code): CJ    FUNCTIONAL ABILITY STATUS  Gait Assessment   Functional Mobility/Gait Assessment  Gait Assistance: Supervision  Distance (ft): 150x2  Assistive Device: Rolling walker  Pattern: L Decreased stance time  Stairs: Stairs; Car transfer  How Many Stairs: 4x2  Device: 2 Rails;1 Rail  Assist: Contact guard assist  Pattern: Ascend and Descend  Ascend and Descend : Step to  Car transfer: supervision (cues for sequencing and body mechanics)    Skilled Therapy Provided  Pt presents in BS chair. Educated pt on  hip precautions and body mechanics. Pt performed seated and standing therex per DUKE protocol.  Pt gait trained c RW cues for reciprocal gait pattern, WBAT and proper integration of RW with good return demo. Pt t/f trained as noted above c cues for sequencing. Pt has RW for home use. Pt left in chair, needs met. All questions and concerns addressed. Bed Mobility:  Rolling:    Supine<>Sit:    Sit<>Supine:      Transfer Mobility:  Sit<>Stand: supervision from BS chair, min A from tub/car t/f bench    Stand<>Sit: min A initially for eccentric control d/t poor knee flexion B knees (chronic)   Progressed to supervision c cues to reach back    Gait: CGA to supervision           THERAPEUTIC EXERCISES  Lower Extremity Ankle pumps  Heel raises  Knee extension  LAQ  HS curl, standing rocks      Upper Extremity      Position Sitting & Standing     Repetitions   10   Sets   1     Patient End of Session: Up in chair;Needs met;Call light within reach;RN aware of session/findings; All patient questions and concerns addressed; Ice applied; Alarm set    PT Session Time: 40 minutes  Gait Training: 15 minutes  Therapeutic Activity: 15 minutes  Therapeutic Exercise: 10 minutes   Neuromuscular Re-education:  minutes

## 2023-08-25 NOTE — HOME CARE LIAISON
Met with patient at the bedside and remains agreeable for all home health care services.  Will continue to monitor and be available if needed

## 2023-08-25 NOTE — PLAN OF CARE
Patient alert and oriented x4. VSS on RA. Pain controlled with scheduled pain meds. Denies n/t. Aquacel dressing to left hip, CDI. Spanda  in place, gel ice at bedside. SCDs in place, ankle pumps encouraged, IS encouraged. Pt up x1 with the walker. Tolerating diet no c/o n/v. Voiding freely in bathroom.      Plan: discharge with [facility] when cleared by all services

## 2023-08-28 ENCOUNTER — PATIENT OUTREACH (OUTPATIENT)
Dept: CASE MANAGEMENT | Age: 73
End: 2023-08-28

## 2023-08-28 ENCOUNTER — TELEPHONE (OUTPATIENT)
Dept: ORTHOPEDICS CLINIC | Facility: CLINIC | Age: 73
End: 2023-08-28

## 2023-08-28 NOTE — TELEPHONE ENCOUNTER
Yonathan Fletcher from [facility] called to advise that  was patient seen on sat for PT , and will see 2 this week and 1 next week. Patient then will be discharged since she will start out patient pt on 9/7/2023. Out patient PT was scheduled prior to started services with [facility]. Patient will be discharged from [facility] before 9/7/2023, call back is [phone] for further questions.

## 2023-09-06 ENCOUNTER — OFFICE VISIT (OUTPATIENT)
Dept: ORTHOPEDICS CLINIC | Facility: CLINIC | Age: 73
End: 2023-09-06
Payer: MEDICARE

## 2023-09-06 VITALS — HEIGHT: 64 IN | WEIGHT: 180 LBS | BODY MASS INDEX: 30.73 KG/M2

## 2023-09-06 DIAGNOSIS — Z96.642 STATUS POST LEFT HIP REPLACEMENT: Primary | ICD-10-CM

## 2023-09-06 PROCEDURE — 99024 POSTOP FOLLOW-UP VISIT: CPT | Performed by: PHYSICIAN ASSISTANT

## 2023-09-06 PROCEDURE — 1125F AMNT PAIN NOTED PAIN PRSNT: CPT | Performed by: PHYSICIAN ASSISTANT

## 2023-09-06 PROCEDURE — 1111F DSCHRG MED/CURRENT MED MERGE: CPT | Performed by: PHYSICIAN ASSISTANT

## 2023-09-06 RX ORDER — ENOXAPARIN SODIUM 150 MG/ML
INJECTION SUBCUTANEOUS EVERY 12 HOURS
COMMUNITY

## 2023-09-06 NOTE — PROGRESS NOTES
[facility] Progress Note    Subjective: Patient returns to clinic 2 weeks status post left posterior total hip arthroplasty performed on 8/24/23. They have been taking Tylenol for pain control. They continue to take lovenox for DVT prophylaxis and will do so until 6 weeks post-op. They continue to abide by posterior hip precautions. They are overall satisfied with their progress. Denies any fevers, chills, night sweats. No redness or drainage from the incision concerning for infection. Objective: Patient is seated comfortably in the exam chair. Alert and oriented. Nonlabored breathing. Grossly neurologically intact. Incision is clean, dry, and intact with steri-strips in place without any redness or drainage concerning for infection. Calves are soft and nontender. Assessment/Plan:  Patient is doing well now 2 weeks s/p left posterior total hip arthoplasty. They may get their incision wet in the shower at this point in time, avoiding soaking or submerging the incision in a pool or tub until 6 weeks postop. Continue with lovenox for DVT prophylaxis. Continue with posterior hip precautions for another 4 weeks. Outpatient physical therapy referral written. Follow-up in 4 weeks with Dr. Lennox Lima for routine 6-week postoperative visit or sooner if any concerns. The patient's questions were sought and answered satisfactorily. They are happy with the plan and will follow as advised. X-rays needed at next visit: Postoperative radiographs left hip        Jessica Gonzalez PA-C  [address] Orthopedic Surgery    This note was dictated using Dragon software. While it was briefly proofread prior to completion, some grammatical, spelling, and word choice errors due to dictation may still occur.

## 2023-09-07 ENCOUNTER — OFFICE VISIT (OUTPATIENT)
Dept: PHYSICAL THERAPY | Facility: HOSPITAL | Age: 73
End: 2023-09-07
Attending: ORTHOPAEDIC SURGERY
Payer: MEDICARE

## 2023-09-07 DIAGNOSIS — Z96.642 STATUS POST LEFT HIP REPLACEMENT: Primary | ICD-10-CM

## 2023-09-07 PROCEDURE — 97161 PT EVAL LOW COMPLEX 20 MIN: CPT | Performed by: PHYSICAL THERAPIST

## 2023-09-07 PROCEDURE — 97110 THERAPEUTIC EXERCISES: CPT | Performed by: PHYSICAL THERAPIST

## 2023-09-11 RX ORDER — LETROZOLE 2.5 MG/1
2.5 TABLET, FILM COATED ORAL DAILY
Qty: 90 TABLET | Refills: 0 | Status: SHIPPED | OUTPATIENT
Start: 2023-09-11 | End: 2023-12-08

## 2023-09-12 ENCOUNTER — OFFICE VISIT (OUTPATIENT)
Dept: PHYSICAL THERAPY | Facility: HOSPITAL | Age: 73
End: 2023-09-12
Attending: ORTHOPAEDIC SURGERY
Payer: MEDICARE

## 2023-09-12 PROCEDURE — 97110 THERAPEUTIC EXERCISES: CPT | Performed by: PHYSICAL THERAPIST

## 2023-09-12 PROCEDURE — 97112 NEUROMUSCULAR REEDUCATION: CPT | Performed by: PHYSICAL THERAPIST

## 2023-09-15 ENCOUNTER — OFFICE VISIT (OUTPATIENT)
Dept: PHYSICAL THERAPY | Facility: HOSPITAL | Age: 73
End: 2023-09-15
Attending: ORTHOPAEDIC SURGERY
Payer: MEDICARE

## 2023-09-15 PROCEDURE — 97116 GAIT TRAINING THERAPY: CPT | Performed by: PHYSICAL THERAPIST

## 2023-09-15 PROCEDURE — 97112 NEUROMUSCULAR REEDUCATION: CPT | Performed by: PHYSICAL THERAPIST

## 2023-09-15 PROCEDURE — 97110 THERAPEUTIC EXERCISES: CPT | Performed by: PHYSICAL THERAPIST

## 2023-09-15 NOTE — PROGRESS NOTES
Diagnosis:   Status post left hip replacement (U87.678)        Referring Provider: Ifeoma Leroy  Date of Evaluation:    9/7/23    Precautions:  None Next MD visit:   none scheduled  Date of Surgery: n/a   Insurance Primary/Secondary: MEDICARE / [employer]     # Auth Visits: 10            Subjective: Feels good overall. Hip doesn't hurt that much . I'm very surprised at how the pain isn't there . Brought my cane today, and I'd like to try to use it. Pain: 0/10      Objective: seen for  treatment. Gait training with SPC . Adjusted for height. Safe and steady without loss of balance . Endurance with completing theraband activities       Assessment: Is progressing nicely with therapy, an overall general strength of left hip . Gait pattern with use of SPC steady , sba . Goals:   Pt will have improved hip AROM Flex to >75  deg and ABD to >10 deg to be able to don/doff shoes and perform car transfers without difficulty  Pt will improve hip ABD and ER strength to 4/5 to increase ease with standing and walking   Pt will demonstrate improved SLS to >10 seconds TRISTAN to promote safety and decrease risk of falls on uneven surfaces such as grass  Pt will perform sit> <50 seconds, demonstrating improved gait speed for improved participation in ADL such as community ambulation  Pt will be able to squat to  light objects around the house with <1/10 hip pain   Pt will improve functional hip strength to report ability to ascend/descend 1 flight of stairs reciprocally without use of handrail  Pt will be independent and compliant with comprehensive HEP to maintain progress achieved in PT    Plan: Progress with strength, ROM ,endurance, CKC with posterior hip precautions   Date: 9/12/2023  TX#: 2/8 Date:              9/15/23   TX#: 3/ Date:                 TX#: 4/ Date:                 TX#: 5/ Date:    Tx#: 6/   NuStep seat 12 res 3  NuStep seat 12 res 3       TE:   Supine ROM left hip multiple bouts SLR x10    TE:   Supine ROM left hip multiple bouts   SLR x10         Standing TE\"   Balance board x2' each direction   Step up x10 HHA 2\"  Step downs x10 HHA 2'   Side steps ed tb x2 laps // bars   SLB on airex x10   Heel raises x10 on airex      Standing TE\"   Balance board x2' each direction   Step up x10 HHA 2\"  Step downs x10 HHA 2'   Side steps ed tb x2 laps // bars   SLB on airex x10   Heel raises x10 on airex   Gait training x8'              HEP: see patient instructions     Charges: 1TE 1 NMR       1 gait training  Total Timed Treatment: 45 min  Total Treatment Time: 45 min

## 2023-09-19 ENCOUNTER — OFFICE VISIT (OUTPATIENT)
Dept: PHYSICAL THERAPY | Facility: HOSPITAL | Age: 73
End: 2023-09-19
Attending: ORTHOPAEDIC SURGERY
Payer: MEDICARE

## 2023-09-19 PROCEDURE — 97110 THERAPEUTIC EXERCISES: CPT | Performed by: PHYSICAL THERAPIST

## 2023-09-19 PROCEDURE — 97112 NEUROMUSCULAR REEDUCATION: CPT | Performed by: PHYSICAL THERAPIST

## 2023-09-20 ENCOUNTER — TELEPHONE (OUTPATIENT)
Dept: HEMATOLOGY/ONCOLOGY | Facility: HOSPITAL | Age: 73
End: 2023-09-20

## 2023-09-20 RX ORDER — ENOXAPARIN SODIUM 100 MG/ML
40 INJECTION SUBCUTANEOUS DAILY
Qty: 4.8 ML | Refills: 0 | Status: SHIPPED | OUTPATIENT
Start: 2023-09-20 | End: 2023-10-02

## 2023-09-20 NOTE — TELEPHONE ENCOUNTER
Spoke with patient. States she is 12 days short of 6 weeks. Refill sent to requested pharmacy on file.

## 2023-09-20 NOTE — TELEPHONE ENCOUNTER
Patient called to see if Dr Myron Vargas wants to order more Lovenox injections for the patient. Please call the patient to advise. Thank you.

## 2023-09-20 NOTE — PROGRESS NOTES
Diagnosis:   Status post left hip replacement (U69.599)        Referring Provider: Kenneth Fraire  Date of Evaluation:    9/7/23    Precautions:  None Next MD visit:   none scheduled  Date of Surgery: n/a   Insurance Primary/Secondary: MEDICARE / [address] [place] [address]     # Auth Visits: 10            Subjective: My hip feels great. Really surprised at how good it feels . Pain: 0/10      Objective: seen for  treatment. Able to complete SLR x20 without difficulty. Assessment: Is progressing well yg with increase ROM , strength and gait with  use of RW. Needs encouragement yg with walking with upright posture. Goals:   Pt will have improved hip AROM Flex to >75  deg and ABD to >10 deg to be able to don/doff shoes and perform car transfers without difficulty  Pt will improve hip ABD and ER strength to 4/5 to increase ease with standing and walking   Pt will demonstrate improved SLS to >10 seconds TRISTAN to promote safety and decrease risk of falls on uneven surfaces such as grass  Pt will perform sit> <50 seconds, demonstrating improved gait speed for improved participation in ADL such as community ambulation  Pt will be able to squat to  light objects around the house with <1/10 hip pain   Pt will improve functional hip strength to report ability to ascend/descend 1 flight of stairs reciprocally without use of handrail  Pt will be independent and compliant with comprehensive HEP to maintain progress achieved in PT    Plan: Progress with strength, ROM ,endurance, CKC with posterior hip precautions   Date: 9/12/2023  TX#: 2/8 Date:              9/15/23   TX#: 3/ Date:             9/19/23    TX#: 4/ Date:                 TX#: 5/ Date:    Tx#: 6/   NuStep seat 12 res 3  NuStep seat 12 res 3  NuStep seat 12 res 3      TE:   Supine ROM left hip multiple bouts   SLR x10    TE:   Supine ROM left hip multiple bouts   SLR x10    TE:   Supine ROM left hip multiple bouts   SLR x10      Standing TE\"   Balance board x2' each direction   Step up x10 HHA 2\"  Step downs x10 HHA 2'   Side steps ed tb x2 laps // bars   SLB on airex x10   Heel raises x10 on airex      Standing TE\"   Balance board x2' each direction   Step up x10 HHA 2\"  Step downs x10 HHA 2'   Side steps ed tb x2 laps // bars   SLB on airex x10   Heel raises x10 on airex   Gait training x8'  Standing TE\"   Balance board x2' each direction   Step up x10 HHA 2\"  Step downs x10 HHA 2'   Side steps red tb x2 laps // bars   SLB on airex x10   Heel raises x10 on airex   Sidelying Clams x20               HEP: see patient instructions     Charges: 2TE 1 NMR       Total Timed Treatment: 45 min  Total Treatment Time: 45 min

## 2023-09-21 ENCOUNTER — OFFICE VISIT (OUTPATIENT)
Dept: PHYSICAL THERAPY | Facility: HOSPITAL | Age: 73
End: 2023-09-21
Attending: ORTHOPAEDIC SURGERY
Payer: MEDICARE

## 2023-09-21 PROCEDURE — 97110 THERAPEUTIC EXERCISES: CPT | Performed by: PHYSICAL THERAPIST

## 2023-09-21 PROCEDURE — 97112 NEUROMUSCULAR REEDUCATION: CPT | Performed by: PHYSICAL THERAPIST

## 2023-09-21 NOTE — PROGRESS NOTES
Diagnosis:   Status post left hip replacement (Y05.586)        Referring Provider: Stanton Awad  Date of Evaluation:    9/7/23    Precautions:  None Next MD visit:   none scheduled  Date of Surgery: n/a   Insurance Primary/Secondary: MEDICARE / [address] [place] [address]     # Auth Visits: 10            Subjective: Hip feels pretty good overall. Using the walker all the time when I go out . Goes up and down the stirs 2x/day now. .  Pain: 2/10      Objective: seen for  treatment. Gait training with SPC . Adjusted for height. Safe and steady without loss of balance . am around length of clinic with sba with [facility]      Assessment: Improving overall   Is stable with SPC and is able to tolerate increases level of gait, exercise. Goals:   Pt will have improved hip AROM Flex to >75  deg and ABD to >10 deg to be able to don/doff shoes and perform car transfers without difficulty  Pt will improve hip ABD and ER strength to 4/5 to increase ease with standing and walking   Pt will demonstrate improved SLS to >10 seconds TRISTAN to promote safety and decrease risk of falls on uneven surfaces such as grass  Pt will perform sit> <50 seconds, demonstrating improved gait speed for improved participation in ADL such as community ambulation  Pt will be able to squat to  light objects around the house with <1/10 hip pain   Pt will improve functional hip strength to report ability to ascend/descend 1 flight of stairs reciprocally without use of handrail  Pt will be independent and compliant with comprehensive HEP to maintain progress achieved in PT    Plan: Progress with strength, ROM ,endurance, CKC with posterior hip precautions 2x/week  Date: 9/12/2023  TX#: 2/8 Date:              9/15/23   TX#: 3/ Date:             9/19/23    TX#: 4/ Date:          9/21/23       TX#: 5/ Date:    Tx#: 6/   NuStep seat 12 res 3  NuStep seat 12 res 3  NuStep seat 12 res 3  NuStep seat 12 res 3    TE:   Supine ROM left hip multiple bouts   SLR x10    TE:   Supine ROM left hip multiple bouts   SLR x10    TE:   Supine ROM left hip multiple bouts   SLR x10  TE:   Supine ROM left hip multiple bouts   SLR x10     Standing TE\"   Balance board x2' each direction   Step up x10 HHA 2\"  Step downs x10 HHA 2'   Side steps ed tb x2 laps // bars   SLB on airex x10   Heel raises x10 on airex      Standing TE\"   Balance board x2' each direction   Step up x10 HHA 2\"  Step downs x10 HHA 2'   Side steps ed tb x2 laps // bars   SLB on airex x10   Heel raises x10 on airex   Gait training x8'  Standing TE\"   Balance board x2' each direction   Step up x10 HHA 2\"  Step downs x10 HHA 2'   Side steps red tb x2 laps // bars   SLB on airex x10   Heel raises x10 on airex   Sidelying Clams x20    Standing TE\"   Balance board x2' each direction   Step up x10 HHA 2\"  Step downs x10 HHA 2'   Side steps red tb x2 laps // bars   SLB on airex x10   Heel raises x10 on airex   Sidelying Clams x20   Gait training with SPC length of clinic            HEP: see patient instructions     Charges: 2TE 1 NMR       Total Timed Treatment: 45 min  Total Treatment Time: 45 min

## 2023-09-26 ENCOUNTER — OFFICE VISIT (OUTPATIENT)
Dept: PHYSICAL THERAPY | Facility: HOSPITAL | Age: 73
End: 2023-09-26
Attending: ORTHOPAEDIC SURGERY
Payer: MEDICARE

## 2023-09-26 PROCEDURE — 97112 NEUROMUSCULAR REEDUCATION: CPT | Performed by: PHYSICAL THERAPIST

## 2023-09-26 PROCEDURE — 97110 THERAPEUTIC EXERCISES: CPT | Performed by: PHYSICAL THERAPIST

## 2023-09-26 NOTE — PROGRESS NOTES
Diagnosis:   Status post left hip replacement (X90.039)        Referring Provider: Temo Dumont  Date of Evaluation:    9/7/23    Precautions:  None Next MD visit:   none scheduled  Date of Surgery: n/a   Insurance Primary/Secondary: MEDICARE / [employer]     # Auth Visits: 10            Subjective: has started to use the [employer] when making quick trips tot he store;  has been using it when she is outside for her walking. Pain: 2/10  mostly soreness       Objective: seen for  treatment. Right knee stiffness today . Strength 4/5 left lE. Assessment: Is progressing well with current treatment and is demonstrating good improvement yg with strength.    Goals:   Pt will have improved hip AROM Flex to >75  deg and ABD to >10 deg to be able to don/doff shoes and perform car transfers without difficulty  Pt will improve hip ABD and ER strength to 4/5 to increase ease with standing and walking   Pt will demonstrate improved SLS to >10 seconds TRISTAN to promote safety and decrease risk of falls on uneven surfaces such as grass  Pt will perform sit> <50 seconds, demonstrating improved gait speed for improved participation in ADL such as community ambulation  Pt will be able to squat to  light objects around the house with <1/10 hip pain   Pt will improve functional hip strength to report ability to ascend/descend 1 flight of stairs reciprocally without use of handrail  Pt will be independent and compliant with comprehensive HEP to maintain progress achieved in PT    Plan: Progress with strength, ROM ,endurance, CKC with posterior hip precautions 2x/week  Date: 9/12/2023  TX#: 2/8 Date:              9/15/23   TX#: 3/ Date:             9/19/23    TX#: 4/ Date:          9/21/23       TX#: 5/ Date: 9/26/23  Tx#: 6/   NuStep seat 12 res 3  NuStep seat 12 res 3  NuStep seat 12 res 3  NuStep seat 12 res 3 NuStep seat 12 res 3   TE:   Supine ROM left hip multiple bouts   SLR x10    TE:   Supine ROM left hip multiple bouts   SLR x10    TE:   Supine ROM left hip multiple bouts   SLR x10  TE:   Supine ROM left hip multiple bouts   SLR x10  TE:   Supine ROM left hip multiple bouts   SLR x10 x2 sets   Sidelying gip ab x20 sidelying clams     Standing TE\"   Balance board x2' each direction   Step up x10 HHA 2\"  Step downs x10 HHA 2'   Side steps ed tb x2 laps // bars   SLB on airex x10   Heel raises x10 on airex      Standing TE\"   Balance board x2' each direction   Step up x10 HHA 2\"  Step downs x10 HHA 2'   Side steps ed tb x2 laps // bars   SLB on airex x10   Heel raises x10 on airex   Gait training x8'  Standing TE\"   Balance board x2' each direction   Step up x10 HHA 2\"  Step downs x10 HHA 2'   Side steps red tb x2 laps // bars   SLB on airex x10   Heel raises x10 on airex   Sidelying Clams x20    Standing TE\"   Balance board x2' each direction   Step up x10 HHA 2\"  Step downs x10 HHA 2'   Side steps red tb x2 laps // bars   SLB on airex x10   Heel raises x10 on airex   Sidelying Clams x20   Gait training with SPC length of clinic  Standing TE\"   Balance board x2' each direction   Step up x10 HHA 2\"  Step downs x10 HHA 2'   Side steps red tb x2 laps // bars   SLB on airex x10   Toe taps b LE onto 4\"     Gait training with SPC length of clinic           HEP: see patient instructions     Charges: 2TE 1 NMR       Total Timed Treatment: 45 min  Total Treatment Time: 45 min

## 2023-09-28 ENCOUNTER — TELEPHONE (OUTPATIENT)
Dept: PHYSICAL THERAPY | Facility: HOSPITAL | Age: 73
End: 2023-09-28

## 2023-09-28 ENCOUNTER — OFFICE VISIT (OUTPATIENT)
Dept: PHYSICAL THERAPY | Facility: HOSPITAL | Age: 73
End: 2023-09-28
Attending: ORTHOPAEDIC SURGERY
Payer: MEDICARE

## 2023-09-28 PROCEDURE — 97110 THERAPEUTIC EXERCISES: CPT

## 2023-09-28 PROCEDURE — 97112 NEUROMUSCULAR REEDUCATION: CPT

## 2023-09-28 NOTE — PROGRESS NOTES
Diagnosis:   Status post left hip replacement (B72.562)        Referring Provider: Orlin Bolton  Date of Evaluation:    9/7/23    Precautions:  None Next MD visit:   none scheduled  Date of Surgery: n/a   Insurance Primary/Secondary: MEDICARE / [facility]     # Auth Visits: 10            Subjective:   Has started to use the [facility] when walking outside and to/from the clinic. Noted soreness in B knees when transitioning to the [facility] use. No pain left hip this morning. R knee is stiff but not painful. Reports taking Tylenol prn. .. Pain:   0/10. Objective: see treatment flow sheet. Right knee stiffness today . Strength 4/5 left LE. Assessment: Is progressing well with current treatment and is demonstrating good improvement yg with strength.    Goals:   Pt will have improved hip AROM Flex to >75  deg and ABD to >10 deg to be able to don/doff shoes and perform car transfers without difficulty  Pt will improve hip ABD and ER strength to 4/5 to increase ease with standing and walking   Pt will demonstrate improved SLS to >10 seconds TRISTAN to promote safety and decrease risk of falls on uneven surfaces such as grass  Pt will perform sit> <50 seconds, demonstrating improved gait speed for improved participation in ADL such as community ambulation  Pt will be able to squat to  light objects around the house with <1/10 hip pain   Pt will improve functional hip strength to report ability to ascend/descend 1 flight of stairs reciprocally without use of handrail  Pt will be independent and compliant with comprehensive HEP to maintain progress achieved in PT    Plan: Progress with strength, ROM ,endurance, CKC with posterior hip precautions 2x/week  Date: 9/12/2023  TX#: 2/8 Date:              9/15/23   TX#: 3/ Date:             9/19/23    TX#: 4/ Date:          9/21/23       TX#: 5/ Date: 9/26/23  Tx#: 6/ 9/28/23  #7   NuStep seat 12 res 3  NuStep seat 12 res 3  NuStep seat 12 res 3  NuStep seat 12 res 3 NuStep seat 12 res 3 NuStep (11/10) L3 for 5 minutes. TE:   Supine ROM left hip multiple bouts   SLR x10    TE:   Supine ROM left hip multiple bouts   SLR x10    TE:   Supine ROM left hip multiple bouts   SLR x10  TE:   Supine ROM left hip multiple bouts   SLR x10  TE:   Supine ROM left hip multiple bouts   SLR x10 x2 sets   Sidelying gip ab x20 sidelying clams   TE:    AA DKTC with SB x 10. Bridging with calves on SB 2 x 10. Supine SLRs x 10 x 2. Supine left hip PROM by PT. Right side lying:    L HA 2 x 10. L clams 2 x 10. Standing TE\"   Balance board x2' each direction   Step up x10 HHA 2\"  Step downs x10 HHA 2'   Side steps ed tb x2 laps // bars   SLB on airex x10   Heel raises x10 on airex      Standing TE\"   Balance board x2' each direction   Step up x10 HHA 2\"  Step downs x10 HHA 2'   Side steps ed tb x2 laps // bars   SLB on airex x10   Heel raises x10 on airex   Gait training x8'  Standing TE\"   Balance board x2' each direction   Step up x10 HHA 2\"  Step downs x10 HHA 2'   Side steps red tb x2 laps // bars   SLB on airex x10   Heel raises x10 on airex   Sidelying Clams x20    Standing TE\"   Balance board x2' each direction   Step up x10 HHA 2\"  Step downs x10 HHA 2'   Side steps red tb x2 laps // bars   SLB on airex x10   Heel raises x10 on airex   Sidelying Clams x20   Gait training with SPC length of clinic  Standing TE\"   Balance board x2' each direction   Step up x10 HHA 2\"  Step downs x10 HHA 2'   Side steps red tb x2 laps // bars   SLB on airex x10   Toe taps b LE onto 4\"     Gait training with SPC length of clinic  Seated   HSS with ankle pumps x 10 x 2 l/r. Ирина Santos 2\" ASU/Over with HHA x 10 reps each leg. Wide balance board a/p and m/l x 25 reps each direction. Big marching x 10 l/r. Heddy Kluver DL standing on the floor forward tapping to 4\" step x 15 reps each leg. SLB on Airex with HHA x 10 each. DL HR on step x 15.             HEP: see patient instructions     Charges: 2TE 1 NMR Total Timed Treatment: 42 min  Total Treatment Time: 42 min

## 2023-10-03 ENCOUNTER — OFFICE VISIT (OUTPATIENT)
Dept: PHYSICAL THERAPY | Facility: HOSPITAL | Age: 73
End: 2023-10-03
Attending: ORTHOPAEDIC SURGERY
Payer: MEDICARE

## 2023-10-03 ENCOUNTER — TELEPHONE (OUTPATIENT)
Facility: CLINIC | Age: 73
End: 2023-10-03

## 2023-10-03 DIAGNOSIS — Z96.642 STATUS POST LEFT HIP REPLACEMENT: Primary | ICD-10-CM

## 2023-10-03 PROCEDURE — 97110 THERAPEUTIC EXERCISES: CPT | Performed by: PHYSICAL THERAPIST

## 2023-10-03 PROCEDURE — 97140 MANUAL THERAPY 1/> REGIONS: CPT | Performed by: PHYSICAL THERAPIST

## 2023-10-03 NOTE — PROGRESS NOTES
Progress Summary  Pt has attended 8 visits in Physical Therapy. Diagnosis:   Status post left hip replacement (E55.020)        Referring Provider: Roxane Kee  Date of Evaluation:    9/7/23    Precautions:  None Next MD visit:   none scheduled  Date of Surgery: n/a   Insurance Primary/Secondary: MEDICARE / [address] [place] [address]     # Auth Visits: 10            Subjective:   My hip is feeling good overall and I am happy with how my strength is improving. Using the [facility] majority of day . Walked further in the grocery store than I have before. Pain:   0/10. Objective: see treatment flow sheet. Right knee stiffness today . Strength 4/5 left LE.   ROM left 119  Abd 22     Hip  9/7/23 Hip  10/3/23   Flexion: R120; L 75  Extension: R nl; L 10  Abduction: R nl; L 10  ER: R nl; L NT  IR: R nl; L nt Flexion: R120; L 119  Extension: R nl; L 22  Abduction: R nl; L 22  ER: R nl; L NT  IR: R nl; L nt       Assessment: Is progressing very well with current treatment . She has progressed from using walker for all home and community ambulation to using a SPC with gait including community.    Goals:   Pt will have improved hip AROM Flex to >75  deg and ABD to >10 deg to be able to don/doff shoes and perform car transfers without difficulty PROGRESSING    Pt will improve hip ABD and ER strength to 4/5 to increase ease with standing and walking PROGRESSING  Pt will demonstrate improved SLS to >10 seconds TRISTAN to promote safety and decrease risk of falls on uneven surfaces such as grass PROGRESSING  Pt will perform sit> <50 seconds, demonstrating improved gait speed for improved participation in ADL such as community ambulation PROGRESSING  Pt will be able to squat to  light objects around the house with <1/10 hip pain PROGRESSING  Pt will improve functional hip strength to report ability to ascend/descend 1 flight of stairs reciprocally without use of handrail PROGRESSING  Pt will be independent and compliant with comprehensive HEP to maintain progress achieved in PT PROGRESSING    Plan: Progress with strength, ROM ,endurance, CKC with posterior hip precautions 2x/week   Date: 9/12/2023  TX#: 2/8 Date:              9/15/23   TX#: 3/ Date:             9/19/23    TX#: 4/ Date:          9/21/23       TX#: 5/ Date: 9/26/23  Tx#: 6/ 9/28/23  #7 10/3/23/23  #8   NuStep seat 12 res 3  NuStep seat 12 res 3  NuStep seat 12 res 3  NuStep seat 12 res 3 NuStep seat 12 res 3 NuStep (11/10) L3 for 5 minutes. NuStep (11/10) L3 for 5 minutes. TE:   Supine ROM left hip multiple bouts   SLR x10    TE:   Supine ROM left hip multiple bouts   SLR x10    TE:   Supine ROM left hip multiple bouts   SLR x10  TE:   Supine ROM left hip multiple bouts   SLR x10  TE:   Supine ROM left hip multiple bouts   SLR x10 x2 sets   Sidelying hip ab x20 sidelying clams   TE:    AA DKTC with SB x 10. Bridging with calves on SB 2 x 10. Supine SLRs x 10 x 2. Supine left hip PROM by PT. Right side lying:    L HA 2 x 10. L clams 2 x 10. TE:   Supine ROM left hip multiple bouts   SLR x10 x2 sets   Sidelying hip ab x20 sidelying clams x20   Supine left hip PROM by PT.     Standing TE\"   Balance board x2' each direction   Step up x10 HHA 2\"  Step downs x10 HHA 2'   Side steps ed tb x2 laps // bars   SLB on airex x10   Heel raises x10 on airex      Standing TE\"   Balance board x2' each direction   Step up x10 HHA 2\"  Step downs x10 HHA 2'   Side steps ed tb x2 laps // bars   SLB on airex x10   Heel raises x10 on airex   Gait training x8'  Standing TE\"   Balance board x2' each direction   Step up x10 HHA 2\"  Step downs x10 HHA 2'   Side steps red tb x2 laps // bars   SLB on airex x10   Heel raises x10 on airex   Sidelying Clams x20    Standing TE\"   Balance board x2' each direction   Step up x10 HHA 2\"  Step downs x10 HHA 2'   Side steps red tb x2 laps // bars   SLB on airex x10   Heel raises x10 on airex   Sidelying Clams x20   Gait training with [address] length of clinic  Standing TE\"   Balance board x2' each direction   Step up x10 HHA 2\"  Step downs x10 HHA 2'   Side steps red tb x2 laps // bars   SLB on airex x10   Toe taps b LE onto 4\"     Gait training with SPC length of clinic  Seated   HSS with ankle pumps x 10 x 2 l/r. [place] 2\" ASU/Over with HHA x 10 reps each leg. Wide balance board a/p and m/l x 25 reps each direction. Big marching x 10 l/r. [place] DL standing on the floor forward tapping to 4\" step x 15 reps each leg. SLB on Airex with HHA x 10 each. DL HR on step x 15. Standing TE\"   Balance board x2' each direction   Step up x10 HHA 2\"  Step downs x10 HHA 2'   Side steps red tb x2 laps // bars   SLB on airex x10   Toe taps b LE onto 4\"   SLB on Airex with HHA x 10 each.             HEP: see patient instructions     Charges: 2TE 1 NMR       Total Timed Treatment: 42 min  Total Treatment Time: 42 min

## 2023-10-03 NOTE — TELEPHONE ENCOUNTER
Xr's ordered for upcoming appointment       Please call patient to come 15-20 minutes earlier to complete images

## 2023-10-04 ENCOUNTER — OFFICE VISIT (OUTPATIENT)
Dept: ORTHOPEDICS CLINIC | Facility: CLINIC | Age: 73
End: 2023-10-04
Payer: MEDICARE

## 2023-10-04 ENCOUNTER — HOSPITAL ENCOUNTER (OUTPATIENT)
Dept: GENERAL RADIOLOGY | Age: 73
Discharge: HOME | End: 2023-10-04
Attending: ORTHOPAEDIC SURGERY
Payer: MEDICARE

## 2023-10-04 DIAGNOSIS — Z96.642 STATUS POST LEFT HIP REPLACEMENT: Primary | ICD-10-CM

## 2023-10-04 DIAGNOSIS — Z96.642 STATUS POST LEFT HIP REPLACEMENT: ICD-10-CM

## 2023-10-04 PROCEDURE — 99024 POSTOP FOLLOW-UP VISIT: CPT | Performed by: ORTHOPAEDIC SURGERY

## 2023-10-04 PROCEDURE — 73502 X-RAY EXAM HIP UNI 2-3 VIEWS: CPT | Performed by: ORTHOPAEDIC SURGERY

## 2023-10-04 NOTE — PROGRESS NOTES
[facility] Progress Note    Subjective: Patient returns to clinic 6 weeks postop from posterior left total hip arthroplasty. She reports she is doing very well, has no pain with the hip and not taking pain medications other than occasional Tylenol. No drainage from the incision. She finished her Lovenox today. She is working with physical therapy, set to finish next week. She is very happy with how she is doing. Objective: Patient is awake and alert, appears comfortable, ambulating with a cane. Left hip incision is well-healed. Imaging: X-rays of the left hip and pelvis personally viewed, independently interpreted and radiology report read. Demonstrates hybrid left total hip arthroplasty unchanged from postoperative films, grade C cement mantle, leg lengths approximately equal, cup in 45 degrees abduction/30 degrees anteversion      Assessment/Plan: 77-year-old female 6 weeks postop status post left hip replacement. Patient doing very well from the hip replacement standpoint. Continue activities as tolerated without restriction. May wean hip precautions. No restrictions on the incision. She is done with DVT prophylaxis and pain medications. Advised follow-up at 1 year with repeat x-rays.     Guillermo Oh MD, [address] [facility]  Phone [phone]  Fax [phone]

## 2023-10-05 ENCOUNTER — OFFICE VISIT (OUTPATIENT)
Dept: PHYSICAL THERAPY | Facility: HOSPITAL | Age: 73
End: 2023-10-05
Attending: ORTHOPAEDIC SURGERY
Payer: MEDICARE

## 2023-10-05 PROCEDURE — 97110 THERAPEUTIC EXERCISES: CPT | Performed by: PHYSICAL THERAPIST

## 2023-10-05 NOTE — PROGRESS NOTES
Progress Summary  Pt has attended 8 visits in Physical Therapy. Diagnosis:   Status post left hip replacement (K30.620)        Referring Provider: Temo Dumont  Date of Evaluation:    9/7/23    Precautions:  None Next MD visit:   none scheduled  Date of Surgery: n/a   Insurance Primary/Secondary: MEDICARE / [address] [place] [address]     # Auth Visits: 10            Subjective:   Saw Dr. Temo Dumont and he is happy with my hip progress. Told m I couple bend over to tie my shoes. Still climb steps sideways one step at a time. Pain:   0/10. Objective: see treatment flow sheet. Circumduct's left LE with completing 4\" step . Strength 4/5 left LE. Observation: fb with [facility]    Hip  9/7/23 Hip  10/3/23   Flexion: R120; L 75  Extension: R nl; L 10  Abduction: R nl; L 10  ER: R nl; L NT  IR: R nl; L nt Flexion: R120; L 119  Extension: R nl; L 22  Abduction: R nl; L 22  ER: R nl; L NT  IR: R nl; L nt       Assessment: Progressing very well, and had good MD followup .   She is improving yg with her gait patterns, and uses SPC primarily   Goals:   Pt will have improved hip AROM Flex to >75  deg and ABD to >10 deg to be able to don/doff shoes and perform car transfers without difficulty PROGRESSING    Pt will improve hip ABD and ER strength to 4/5 to increase ease with standing and walking PROGRESSING  Pt will demonstrate improved SLS to >10 seconds TRISTAN to promote safety and decrease risk of falls on uneven surfaces such as grass PROGRESSING  Pt will perform sit> <50 seconds, demonstrating improved gait speed for improved participation in ADL such as community ambulation PROGRESSING  Pt will be able to squat to  light objects around the house with <1/10 hip pain PROGRESSING  Pt will improve functional hip strength to report ability to ascend/descend 1 flight of stairs reciprocally without use of handrail PROGRESSING  Pt will be independent and compliant with comprehensive HEP to maintain progress achieved in PT PROGRESSING    Plan: Progress with strength, ROM ,endurance, CKC with posterior hip precautions 2x/week    Date: 9/12/2023  TX#: 2/8 Date:              9/15/23   TX#: 3/ Date:             9/19/23    TX#: 4/ Date:          9/21/23       TX#: 5/ Date: 9/26/23  Tx#: 6/ 9/28/23  #7 10/3/23/23  #8 10/5/23/23  #9   NuStep seat 12 res 3  NuStep seat 12 res 3  NuStep seat 12 res 3  NuStep seat 12 res 3 NuStep seat 12 res 3 NuStep (11/10) L3 for 5 minutes. NuStep (11/10) L3 for 5 minutes. NuStep (11/10) L3 for 5 minutes. TE:   Supine ROM left hip multiple bouts   SLR x10    TE:   Supine ROM left hip multiple bouts   SLR x10    TE:   Supine ROM left hip multiple bouts   SLR x10  TE:   Supine ROM left hip multiple bouts   SLR x10  TE:   Supine ROM left hip multiple bouts   SLR x10 x2 sets   Sidelying hip ab x20 sidelying clams   TE:    AA DKTC with SB x 10. Bridging with calves on SB 2 x 10. Supine SLRs x 10 x 2. Supine left hip PROM by PT. Right side lying:    L HA 2 x 10. L clams 2 x 10. TE:   Supine ROM left hip multiple bouts   SLR x10 x2 sets   Sidelying hip ab x20 sidelying clams x20   Supine left hip PROM by PT. TE:   Supine ROM left hip multiple bouts   SLR x10 x2 sets   Sidelying hip ab x20 sidelying clams x20   Supine left hip PROM by PT.     Standing TE\"   Balance board x2' each direction   Step up x10 HHA 2\"  Step downs x10 HHA 2'   Side steps ed tb x2 laps // bars   SLB on airex x10   Heel raises x10 on airex      Standing TE\"   Balance board x2' each direction   Step up x10 HHA 2\"  Step downs x10 HHA 2'   Side steps ed tb x2 laps // bars   SLB on airex x10   Heel raises x10 on airex   Gait training x8'  Standing TE\"   Balance board x2' each direction   Step up x10 HHA 2\"  Step downs x10 HHA 2'   Side steps red tb x2 laps // bars   SLB on airex x10   Heel raises x10 on airex   Sidelying Clams x20    Standing TE\"   Balance board x2' each direction   Step up x10 HHA 2\"  Step downs x10 HHA 2'   Side steps red tb x2 laps // bars   SLB on airex x10   Heel raises x10 on airex   Sidelying Clams x20   Gait training with SPC length of clinic  Standing TE\"   Balance board x2' each direction   Step up x10 HHA 2\"  Step downs x10 HHA 2'   Side steps red tb x2 laps // bars   SLB on airex x10   Toe taps b LE onto 4\"     Gait training with SPC length of clinic  Seated   HSS with ankle pumps x 10 x 2 l/r. Daun Comber 2\" ASU/Over with HHA x 10 reps each leg. Wide balance board a/p and m/l x 25 reps each direction. Big marching x 10 l/r. Daun Comber DL standing on the floor forward tapping to 4\" step x 15 reps each leg. SLB on Airex with HHA x 10 each. DL HR on step x 15. Standing TE\"   Balance board x2' each direction   Step up x10 HHA 2\"  Step downs x10 HHA 2'   Side steps red tb x2 laps // bars   SLB on airex x10   Toe taps b LE onto 4\"   SLB on Airex with HHA x 10 each.  Standing TE\"   Balance board x2' each direction  (white board)  Step up x10 HHA 2\"  Step downs x10 HHA 2'   circumduction   Side steps red tb x2 laps 10 steps     Toe taps b LE onto 4\" x20  FW with red TB x10x2                HEP: see patient instructions     Charges: 2TE 1 NMR       Total Timed Treatment: 42 min  Total Treatment Time: 42 min

## 2023-10-10 ENCOUNTER — OFFICE VISIT (OUTPATIENT)
Dept: PHYSICAL THERAPY | Facility: HOSPITAL | Age: 73
End: 2023-10-10
Attending: ORTHOPAEDIC SURGERY
Payer: MEDICARE

## 2023-10-10 PROCEDURE — 97112 NEUROMUSCULAR REEDUCATION: CPT | Performed by: PHYSICAL THERAPIST

## 2023-10-10 PROCEDURE — 97110 THERAPEUTIC EXERCISES: CPT | Performed by: PHYSICAL THERAPIST

## 2023-10-10 NOTE — PROGRESS NOTES
Progress Summary  Pt has attended 8 visits in Physical Therapy. Diagnosis:   Status post left hip replacement (12.621)        Referring Provider: Roxane Kee  Date of Evaluation:    9/7/23    Precautions:  None Next MD visit:   none scheduled  Date of Surgery: n/a   Insurance Primary/Secondary: MEDICARE / [address] [place] [address]     # Auth Visits: 10            Subjective:   sore today   Not sure if I slept on it wrong today just feel very stiff   Pain:   2/10. Knee pain today . Objective: see treatment flow sheet. Circumduct's left LE with completing 4\" step . Strength 4/5 left LE. Observation: fb with [facility]    Hip  9/7/23 Hip  10/3/23   Flexion: R120; L 75  Extension: R nl; L 10  Abduction: R nl; L 10  ER: R nl; L NT  IR: R nl; L nt Flexion: R120; L 119  Extension: R nl; L 22  Abduction: R nl; L 22  ER: R nl; L NT  IR: R nl; L nt       Assessment: is progressing well with current treatment plan and is progressing well with [facility] protocol.    Goals:   Pt will have improved hip AROM Flex to >75  deg and ABD to >10 deg to be able to don/doff shoes and perform car transfers without difficulty PROGRESSING    Pt will improve hip ABD and ER strength to 4/5 to increase ease with standing and walking PROGRESSING  Pt will demonstrate improved SLS to >10 seconds TRISTAN to promote safety and decrease risk of falls on uneven surfaces such as grass PROGRESSING  Pt will perform sit> <50 seconds, demonstrating improved gait speed for improved participation in ADL such as community ambulation PROGRESSING  Pt will be able to squat to  light objects around the house with <1/10 hip pain PROGRESSING  Pt will improve functional hip strength to report ability to ascend/descend 1 flight of stairs reciprocally without use of handrail PROGRESSING  Pt will be independent and compliant with comprehensive HEP to maintain progress achieved in PT PROGRESSING    Plan: Progress with strength, ROM ,endurance, CKC with posterior hip precautions 2x/week    Date: 9/12/2023  TX#: 2/8 Date:              9/15/23   TX#: 3/ Date:             9/19/23    TX#: 4/ Date:          9/21/23       TX#: 5/ Date: 9/26/23  Tx#: 6/ 9/28/23  #7 10/3/23/23  #8 10/5/23/23  #9 10/10/23/23  #10    NuStep seat 12 res 3  NuStep seat 12 res 3  NuStep seat 12 res 3  NuStep seat 12 res 3 NuStep seat 12 res 3 NuStep (11/10) L3 for 5 minutes. NuStep (11/10) L3 for 5 minutes. NuStep (11/10) L3 for 5 minutes. NuStep (11/10) L3 for 5 minutes. TE:   Supine ROM left hip multiple bouts   SLR x10    TE:   Supine ROM left hip multiple bouts   SLR x10    TE:   Supine ROM left hip multiple bouts   SLR x10  TE:   Supine ROM left hip multiple bouts   SLR x10  TE:   Supine ROM left hip multiple bouts   SLR x10 x2 sets   Sidelying hip ab x20 sidelying clams   TE:    AA DKTC with SB x 10. Bridging with calves on SB 2 x 10. Supine SLRs x 10 x 2. Supine left hip PROM by PT. Right side lying:    L HA 2 x 10. L clams 2 x 10. TE:   Supine ROM left hip multiple bouts   SLR x10 x2 sets   Sidelying hip ab x20 sidelying clams x20   Supine left hip PROM by PT. TE:   Supine ROM left hip multiple bouts   SLR x10 x2 sets   Sidelying hip ab x20 sidelying clams x20   Supine left hip PROM by PT. TE:    AA DKTC with SB x 10. Bridging with calves on SB 2 x 10. Supine SLRs x 10 x 2. Supine left hip PROM by PT. Right side lying:    L HA 2 x 10. L clams 2 x 10.      Standing TE\"   Balance board x2' each direction   Step up x10 HHA 2\"  Step downs x10 HHA 2'   Side steps ed tb x2 laps // bars   SLB on airex x10   Heel raises x10 on airex      Standing TE\"   Balance board x2' each direction   Step up x10 HHA 2\"  Step downs x10 HHA 2'   Side steps ed tb x2 laps // bars   SLB on airex x10   Heel raises x10 on airex   Gait training x8'  Standing TE\"   Balance board x2' each direction   Step up x10 HHA 2\"  Step downs x10 HHA 2'   Side steps red tb x2 laps // bars   SLB on airex x10   Heel raises x10 on airex   Sidelying Clams x20    Standing TE\"   Balance board x2' each direction   Step up x10 HHA 2\"  Step downs x10 HHA 2'   Side steps red tb x2 laps // bars   SLB on airex x10   Heel raises x10 on airex   Sidelying Clams x20   Gait training with SPC length of clinic  Standing TE\"   Balance board x2' each direction   Step up x10 HHA 2\"  Step downs x10 HHA 2'   Side steps red tb x2 laps // bars   SLB on airex x10   Toe taps b LE onto 4\"     Gait training with SPC length of clinic  Seated   HSS with ankle pumps x 10 x 2 l/r. [place] Martínezson 2\" ASU/Over with HHA x 10 reps each leg. Wide balance board a/p and m/l x 25 reps each direction. Big marching x 10 l/r. [place] Ayon DL standing on the floor forward tapping to 4\" step x 15 reps each leg. SLB on Airex with HHA x 10 each. DL HR on step x 15. Standing TE\"   Balance board x2' each direction   Step up x10 HHA 2\"  Step downs x10 HHA 2'   Side steps red tb x2 laps // bars   SLB on airex x10   Toe taps b LE onto 4\"   SLB on Airex with HHA x 10 each.  Standing TE\"   Balance board x2' each direction  (white board)  Step up x10 HHA 2\"  Step downs x10 HHA 2'   circumduction   Side steps red tb x2 laps 10 steps     Toe taps b LE onto 4\" x20  FW with red TB x10x2    Standing TE\"   Balance board x2' each direction  (white board)  Step up x10 HHA 2\"  Step downs x10 HHA 2'   circumduction   Side steps red tb x2 laps 10 steps     Toe taps b LE onto 4\" x20  FW with red TB x10x2                 HEP: see patient instructions     Charges: 2TE 1 NMR       Total Timed Treatment: 42 min  Total Treatment Time: 42 min

## 2023-10-12 ENCOUNTER — OFFICE VISIT (OUTPATIENT)
Dept: PHYSICAL THERAPY | Facility: HOSPITAL | Age: 73
End: 2023-10-12
Attending: ORTHOPAEDIC SURGERY
Payer: MEDICARE

## 2023-10-12 PROCEDURE — 97110 THERAPEUTIC EXERCISES: CPT | Performed by: PHYSICAL THERAPIST

## 2023-10-12 PROCEDURE — 97112 NEUROMUSCULAR REEDUCATION: CPT | Performed by: PHYSICAL THERAPIST

## 2023-10-12 NOTE — PROGRESS NOTES
Progress Summary  Pt has attended 8 visits in Physical Therapy. Diagnosis:   Status post left hip replacement (07.050)        Referring Provider: Olive Araya  Date of Evaluation:    9/7/23    Precautions:  None Next MD visit:   none scheduled  Date of Surgery: n/a   Insurance Primary/Secondary: MEDICARE / [address] [place] [address]     # Auth Visits: 10            Subjective:   Can't believe how good my hip feels . It is so much stronger. Knees are feeling better too,   Pain:   2/10. Knee pain today . Objective: see treatment flow sheet. Circumduct's left LE with completing 4\" step . Responds to cues for avoid circumduction . Sit >stand without arms on chair with mod assist to stand. Amb with spc to and from clinic. Hip  9/7/23 Hip  10/3/23   Flexion: R120; L 75  Extension: R nl; L 10  Abduction: R nl; L 10  ER: R nl; L NT  IR: R nl; L nt Flexion: R120; L 119  Extension: R nl; L 22  Abduction: R nl; L 22  ER: R nl; L NT  IR: R nl; L nt       Assessment: Circumduction persists when completing step up and downs, and most likely the limitation of her knee AROM are contributing to the movement. Subjectively she is very happy with her overall progress and ability to complete most of her ADL's without pian .      Goals:   Pt will have improved hip AROM Flex to >75  deg and ABD to >10 deg to be able to don/doff shoes and perform car transfers without difficulty PROGRESSING    Pt will improve hip ABD and ER strength to 4/5 to increase ease with standing and walking PROGRESSING  Pt will demonstrate improved SLS to >10 seconds TRISTAN to promote safety and decrease risk of falls on uneven surfaces such as grass PROGRESSING  Pt will perform sit> <50 seconds, demonstrating improved gait speed for improved participation in ADL such as community ambulation PROGRESSING  Pt will be able to squat to  light objects around the house with <1/10 hip pain PROGRESSING  Pt will improve functional hip strength to report ability to ascend/descend 1 flight of stairs reciprocally without use of handrail PROGRESSING  Pt will be independent and compliant with comprehensive HEP to maintain progress achieved in PT PROGRESSING    Plan: Progress with strength, ROM ,endurance, CKC with posterior hip precautions 2x/week    Date: 9/12/2023  TX#: 2/8 Date:              9/15/23   TX#: 3/ Date:             9/19/23    TX#: 4/ Date:          9/21/23       TX#: 5/ Date: 9/26/23  Tx#: 6/ 9/28/23  #7 10/3/23/23  #8 10/5/23/23  #9 10/10/23/  #10 10/10/23/  #11   NuStep seat 12 res 3  NuStep seat 12 res 3  NuStep seat 12 res 3  NuStep seat 12 res 3 NuStep seat 12 res 3 NuStep (11/10) L3 for 5 minutes. NuStep (11/10) L3 for 5 minutes. NuStep (11/10) L3 for 5 minutes. NuStep (11/10) L3 for 5 minutes. NuStep (11/10) L3 for 5 minutes. TE:   Supine ROM left hip multiple bouts   SLR x10    TE:   Supine ROM left hip multiple bouts   SLR x10    TE:   Supine ROM left hip multiple bouts   SLR x10  TE:   Supine ROM left hip multiple bouts   SLR x10  TE:   Supine ROM left hip multiple bouts   SLR x10 x2 sets   Sidelying hip ab x20 sidelying clams   TE:    AA DKTC with SB x 10. Bridging with calves on SB 2 x 10. Supine SLRs x 10 x 2. Supine left hip PROM by PT. Right side lying:    L HA 2 x 10. L clams 2 x 10. TE:   Supine ROM left hip multiple bouts   SLR x10 x2 sets   Sidelying hip ab x20 sidelying clams x20   Supine left hip PROM by PT. TE:   Supine ROM left hip multiple bouts   SLR x10 x2 sets   Sidelying hip ab x20 sidelying clams x20   Supine left hip PROM by PT. TE:    AA DKTC with SB x 10. Bridging with calves on SB 2 x 10. Supine SLRs x 10 x 2. Supine left hip PROM by PT. Right side lying:    L HA 2 x 10. L clams 2 x 10.   TE:   Supine ROM left hip multiple bouts   SLR x10 x2 sets   Sidelying hip abd x20 2#   Sidelying clams x20 2#   Sitting knee flex/ext 2x10 bilaterally   PROM left hip x20 reps    Standing TE\"   Balance board x2' each direction   Step up x10 HHA 2\"  Step downs x10 HHA 2'   Side steps ed tb x2 laps // bars   SLB on airex x10   Heel raises x10 on airex      Standing TE\"   Balance board x2' each direction   Step up x10 HHA 2\"  Step downs x10 HHA 2'   Side steps ed tb x2 laps // bars   SLB on airex x10   Heel raises x10 on airex   Gait training x8'  Standing TE\"   Balance board x2' each direction   Step up x10 HHA 2\"  Step downs x10 HHA 2'   Side steps red tb x2 laps // bars   SLB on airex x10   Heel raises x10 on airex   Sidelying Clams x20    Standing TE\"   Balance board x2' each direction   Step up x10 HHA 2\"  Step downs x10 HHA 2'   Side steps red tb x2 laps // bars   SLB on airex x10   Heel raises x10 on airex   Sidelying Clams x20   Gait training with SPC length of clinic  Standing TE\"   Balance board x2' each direction   Step up x10 HHA 2\"  Step downs x10 HHA 2'   Side steps red tb x2 laps // bars   SLB on airex x10   Toe taps b LE onto 4\"     Gait training with SPC length of clinic  Seated   HSS with ankle pumps x 10 x 2 l/r. [place] 2\" ASU/Over with HHA x 10 reps each leg. Wide balance board a/p and m/l x 25 reps each direction. Big marching x 10 l/r. [place] DL standing on the floor forward tapping to 4\" step x 15 reps each leg. SLB on Airex with HHA x 10 each. DL HR on step x 15. Standing TE\"   Balance board x2' each direction   Step up x10 HHA 2\"  Step downs x10 HHA 2'   Side steps red tb x2 laps // bars   SLB on airex x10   Toe taps b LE onto 4\"   SLB on Airex with HHA x 10 each.  Standing TE\"   Balance board x2' each direction  (white board)  Step up x10 HHA 2\"  Step downs x10 HHA 2'   circumduction   Side steps red tb x2 laps 10 steps     Toe taps b LE onto 4\" x20  FW with red TB x10x2    Standing TE\"   Balance board x2' each direction  (white board)  Step up x10 HHA 2\"  Step downs x10 HHA 2'   circumduction   Side steps red tb x2 laps 10 steps     Toe taps b LE onto 4\" x20  FW with red TB x10x2  Standing TE\" Balance board x2' each direction  (white board)  Step up x10 HHA 2\"  Step downs x10 HHA 2'   circumduction   Side steps red tb x2 laps 10 steps   FW with red TB x10x2   SLB on airex x10 @ 5 sec each                HEP: see patient instructions     Charges: 2TE 1 NMR       Total Timed Treatment: 42 min  Total Treatment Time: 42 min

## 2023-10-16 ENCOUNTER — OFFICE VISIT (OUTPATIENT)
Dept: PHYSICAL THERAPY | Facility: HOSPITAL | Age: 73
End: 2023-10-16
Attending: ORTHOPAEDIC SURGERY
Payer: MEDICARE

## 2023-10-16 DIAGNOSIS — I10 PRIMARY HYPERTENSION: ICD-10-CM

## 2023-10-16 PROCEDURE — 97110 THERAPEUTIC EXERCISES: CPT | Performed by: PHYSICAL THERAPIST

## 2023-10-16 PROCEDURE — 97112 NEUROMUSCULAR REEDUCATION: CPT | Performed by: PHYSICAL THERAPIST

## 2023-10-16 NOTE — PROGRESS NOTES
Discharge Summary  Pt has attended 12 visits in Physical Therapy. Diagnosis:   Status post left hip replacement (61.761)        Referring Provider: Temo Dumont  Date of Evaluation:    9/7/23    Precautions:  None Next MD visit:   none scheduled  Date of Surgery: n/a   Insurance Primary/Secondary: MEDICARE / [employer]     # Auth Visits: 12           Subjective:   Feel almost 100% better from when I first started. Can't believe how far I have come. Knees are still a pain and they don't bend like I want them too but they don't hurt like they used to. As long as I don't bend them too much I find that they don't hurt as much . Pain:   2/10. Knee pain today . Objective: see treatment flow sheet. Circumduct's left LE with completing 4\" step . Sit >stand without arms on chair with mod assist to stand. Amb with spc to and from clinic. Transfers stand>sit>supine with sba with SPC   Strength proximal left hip 4/5 without pain. Hip abd 4-/5. Hip  9/7/23 Hip  10/3/23 Hip  10/16/23   Flexion: R120; L 75  Extension: R nl; L 10  Abduction: R nl; L 10  ER: R nl; L NT  IR: R nl; L nt Flexion: R120; L 119  Extension: R nl; L 22  Abduction: R nl; L 22  ER: R nl; L NT  IR: R nl; L nt Flexion: R 122; L 120  Extension: R nl; L   Abduction: R nl; L 22  ER: R nl; L NT  IR: R nl; L nt       Assessment: is progressing very well with completing her course of pt following [facility] on the left. She continues to amb with [facility] but is steady and she feels comfortable with it in a community setting. Her gait is steady. She continues to circumduct her left hip when trying to lift it when completing a step up, but partially is due to tightness and ROM restrictions in her bilateral knees are a contributing factor. She is able to demonstrate independence with her HEP and added two new exercise today to improve her overall strength and endurance of the proximal hip . She is ready for home PT and DC from op PT.  She has green TB for home use. Goals:   Pt will have improved hip AROM Flex to >75  deg and ABD to >10 deg to be able to don/doff shoes and perform car transfers without difficulty MET   Pt will improve hip ABD and ER strength to 4/5 to increase ease with standing and walking MET   Pt will demonstrate improved SLS to >10 seconds TRISTAN to promote safety and decrease risk of falls on uneven surfaces such as grass MET  Pt will perform sit> <50 seconds, demonstrating improved gait speed for improved participation in ADL such as community ambulation MET  Pt will be able to squat to  light objects around the house with <1/10 hip pain MET  Pt will improve functional hip strength to report ability to ascend/descend 1 flight of stairs reciprocally without use of handrail MET  Pt will be independent and compliant with comprehensive HEP to maintain progress achieved in PT MET    Plan: DC PT at this time all goals achieved. Date: 9/12/2023  TX#: 2/8 Date:              9/15/23   TX#: 3/ Date:             9/19/23    TX#: 4/ Date:          9/21/23       TX#: 5/ Date: 9/26/23  Tx#: 6/ 9/28/23  #7 10/3/23/23  #8 10/5/23/23  #9 10/10/23/  #10 10/12/23/  #11 10/16/23/  #12   NuStep seat 12 res 3  NuStep seat 12 res 3  NuStep seat 12 res 3  NuStep seat 12 res 3 NuStep seat 12 res 3 NuStep (11/10) L3 for 5 minutes. NuStep (11/10) L3 for 5 minutes. NuStep (11/10) L3 for 5 minutes. NuStep (11/10) L3 for 5 minutes. NuStep (11/10) L3 for 5 minutes. NuStep (11/10) L3 for 5 minutes. TE:   Supine ROM left hip multiple bouts   SLR x10    TE:   Supine ROM left hip multiple bouts   SLR x10    TE:   Supine ROM left hip multiple bouts   SLR x10  TE:   Supine ROM left hip multiple bouts   SLR x10  TE:   Supine ROM left hip multiple bouts   SLR x10 x2 sets   Sidelying hip ab x20 sidelying clams   TE:    AA DKTC with SB x 10. Bridging with calves on SB 2 x 10. Supine SLRs x 10 x 2. Supine left hip PROM by PT.    Right side lying: L [place] 2 x 10. L clams 2 x 10. TE:   Supine ROM left hip multiple bouts   SLR x10 x2 sets   Sidelying hip ab x20 sidelying clams x20   Supine left hip PROM by PT. TE:   Supine ROM left hip multiple bouts   SLR x10 x2 sets   Sidelying hip ab x20 sidelying clams x20   Supine left hip PROM by PT. TE:    AA DKTC with SB x 10. Bridging with calves on SB 2 x 10. Supine SLRs x 10 x 2. Supine left hip PROM by PT. Right side lying:    L HA 2 x 10. L clams 2 x 10.   TE:   Supine ROM left hip multiple bouts   SLR x10 x2 sets   Sidelying hip abd x20 2#   Sidelying clams x20 2#   Sitting knee flex/ext 2x10 bilaterally   PROM left hip x20 reps  TE:   Supine ROM left hip multiple bouts   SLR x10 x2 sets   Sidelying hip abd x20 2#   Sidelying clams x20 2#   Sitting knee flex/ext 2x10 bilaterally   PROM left hip x20 reps   Add HEP:   Clams x20   Hip Abd x20   Supine bent knee falls outs /OR/ seated hip ab/add    Standing TE\"   Balance board x2' each direction   Step up x10 HHA 2\"  Step downs x10 HHA 2'   Side steps ed tb x2 laps // bars   SLB on airex x10   Heel raises x10 on airex      Standing TE\"   Balance board x2' each direction   Step up x10 HHA 2\"  Step downs x10 HHA 2'   Side steps ed tb x2 laps // bars   SLB on airex x10   Heel raises x10 on airex   Gait training x8'  Standing TE\"   Balance board x2' each direction   Step up x10 HHA 2\"  Step downs x10 HHA 2'   Side steps red tb x2 laps // bars   SLB on airex x10   Heel raises x10 on airex   Sidelying Clams x20    Standing TE\"   Balance board x2' each direction   Step up x10 HHA 2\"  Step downs x10 HHA 2'   Side steps red tb x2 laps // bars   SLB on airex x10   Heel raises x10 on airex   Sidelying Clams x20   Gait training with SPC length of clinic  Standing TE\"   Balance board x2' each direction   Step up x10 HHA 2\"  Step downs x10 HHA 2'   Side steps red tb x2 laps // bars   SLB on airex x10   Toe taps b LE onto 4\"     Gait training with [address] length of clinic Seated   HSS with ankle pumps x 10 x 2 l/r. Jamari Coke 2\" ASU/Over with HHA x 10 reps each leg. Wide balance board a/p and m/l x 25 reps each direction. Big marching x 10 l/r. Jamari Coke DL standing on the floor forward tapping to 4\" step x 15 reps each leg. SLB on Airex with HHA x 10 each. DL HR on step x 15. Standing TE\"   Balance board x2' each direction   Step up x10 HHA 2\"  Step downs x10 HHA 2'   Side steps red tb x2 laps // bars   SLB on airex x10   Toe taps b LE onto 4\"   SLB on Airex with HHA x 10 each. Standing TE\"   Balance board x2' each direction  (white board)  Step up x10 HHA 2\"  Step downs x10 HHA 2'   circumduction   Side steps red tb x2 laps 10 steps     Toe taps b LE onto 4\" x20  FW with red TB x10x2    Standing TE\"   Balance board x2' each direction  (white board)  Step up x10 HHA 2\"  Step downs x10 HHA 2'   circumduction   Side steps red tb x2 laps 10 steps     Toe taps b LE onto 4\" x20  FW with red TB x10x2  Standing TE\"   Balance board x2' each direction  (white board)  Step up x10 HHA 2\"  Step downs x10 HHA 2'   circumduction   Side steps red tb x2 laps 10 steps   FW with red TB x10x2   SLB on airex x10 @ 5 sec each                  HEP: see patient instructions     Charges: 2TE 1 NMR       Total Timed Treatment: 42 min  Total Treatment Time: 42 min  LEFS Score  LEFS Score: 65 % (9/7/2023 12:51 PM)    Post LEFS Score  Post LEFS Score: 78.75 % (10/16/2023  2:54 PM)    13.75 % improvement    Plan: melchor pt     Patient/Family/Caregiver was advised of these findings, precautions, and treatment options and has agreed to actively participate in planning and for this course of care. Thank you for your referral. If you have any questions, please contact me at Dept: [phone]. Sincerely,  Electronically signed by therapist: Ruddy Kale, PT     Physician's certification required:  Yes  Please co-sign or sign and return this letter via fax as soon as possible to [phone].    I certify the need for these services furnished under this plan of treatment and while under my care.     X___________________________________________________ Date____________________    Certification From: 06/18/7599  To:1/14/2024

## 2023-10-16 NOTE — TELEPHONE ENCOUNTER
Protocol PASS    Requesting: amLODIPine 5 MG Oral Tab     LOV: 8/7/23  RTC: 3 months  Filled: 7/20/23 90 tablet 0 refill  Recent Labs: 7/6/23    Upcoming OV   No future appointments.

## 2023-10-17 RX ORDER — AMLODIPINE BESYLATE 5 MG/1
5 TABLET ORAL DAILY
Qty: 90 TABLET | Refills: 0 | Status: SHIPPED | OUTPATIENT
Start: 2023-10-17 | End: 2024-10-11

## 2023-12-08 RX ORDER — LETROZOLE 2.5 MG/1
2.5 TABLET, FILM COATED ORAL DAILY
Qty: 90 TABLET | Refills: 0 | Status: SHIPPED | OUTPATIENT
Start: 2023-12-08 | End: 2024-03-04

## 2024-01-17 DIAGNOSIS — I10 PRIMARY HYPERTENSION: ICD-10-CM

## 2024-01-17 RX ORDER — AMLODIPINE BESYLATE 5 MG/1
5 TABLET ORAL DAILY
Qty: 90 TABLET | Refills: 0 | Status: SHIPPED | OUTPATIENT
Start: 2024-01-17 | End: 2025-01-11

## 2024-01-17 NOTE — TELEPHONE ENCOUNTER
Protocol PASS    Requesting: amLODIPine 5 MG Oral Tab     LOV: 8/7/23  RTC: 3 months  Filled: 10/17/23 90 tablet 0 refill  Recent Labs: 6/28/21    Upcoming OV   No future appointments.

## 2024-03-04 RX ORDER — LETROZOLE 2.5 MG/1
2.5 TABLET, FILM COATED ORAL DAILY
Qty: 90 TABLET | Refills: 0 | Status: SHIPPED | OUTPATIENT
Start: 2024-03-04 | End: 2024-05-31

## 2024-03-14 ENCOUNTER — HOSPITAL ENCOUNTER (OUTPATIENT)
Dept: CT IMAGING | Age: 74
Discharge: HOME | End: 2024-03-14
Attending: INTERNAL MEDICINE
Payer: MEDICARE

## 2024-03-14 DIAGNOSIS — C55 METASTASIS FROM MALIGNANT NEOPLASM OF UTERUS (HCC): ICD-10-CM

## 2024-03-14 DIAGNOSIS — R79.9 ABNORMAL FINDING OF BLOOD CHEMISTRY, UNSPECIFIED: ICD-10-CM

## 2024-03-14 DIAGNOSIS — C79.9 METASTASIS FROM MALIGNANT NEOPLASM OF UTERUS (HCC): ICD-10-CM

## 2024-03-14 LAB
CREAT BLD-MCNC: 1 MG/DL
EGFRCR SERPLBLD CKD-EPI 2021: 59 ML/MIN/1.73M2 (ref 60–?)

## 2024-03-14 PROCEDURE — 74177 CT ABD & PELVIS W/CONTRAST: CPT | Performed by: INTERNAL MEDICINE

## 2024-03-14 PROCEDURE — 82565 ASSAY OF CREATININE: CPT

## 2024-03-20 NOTE — PROGRESS NOTES
[facility] Note    Visit Diagnosis:  1. Iron deficiency anemia, unspecified iron deficiency anemia type    2. 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 Elevated cancer antigen 125 ()    4. Long term (current) use of aromatase inhibitors        History of Present Illness:   73F here for follow up for a RUL PE (dx 7/13/21) suspected to be 2/2 malignancy. She also has a recurrent endometrial adenocarcinoma, endometrioid type, Grade 1 which was ER+. She is now on letrozole since 09/10/21 She had a repeat excision of a LLQ abdominal wall tumor on 5/17/22. She restarted letrozole afterwards.      Hematology/Oncology History:   -She notes having a total abdominal hysterectomy about 25 years ago.    -6/30/21: CT AP showed a greater than 12 cm mass with areas of cystic and necrotic change.    -7/7/21, she underwent a biopsy with Dr. Strickland which showed atypical endometrioid proliferation suspicious for endometrioid adenocarcinoma which was ER positive and negative for GATA3.  93.6.    -7/13/21: staging CT chest showed a short segment PE involving the lobar and segmental branch of the RUL. Bilateral dopplers from 7/13/21 negative along with an enlarged thyroid gland. Bilateral dopplers negative. For her PE, she was started on Lovenox.    -8/10/21: RLQ Radical Abdominal soft tissue resection with Dr. Strickland on 8/10/21. Endometrial adenocarcinoma, endometrioid type, 13.5 cm, Grade 1, negative margins, 0/2 LN.     -9/10/21: Started on Letrozole     -4/6/22: CT CAP: RUL 3 mm nodule no well appreciated anymore. PE resolved. Interval decrease in R pelvic abscess. R Pelvic node smaller. There is LLQ soft tissue density that is bigger (2.6 x 2.9 from 2.4 x 2.8 cm). Case was discussed at tumor board, plan for surgical resection.    -5/17/22: LLQ abdominal wall excision: FIGO grade 1 endometrial carcinoma, endometrioid type. Measures 5 x 2.7 x 2.5 cm. Negative margins. Omental biopsy  negative.     -05/24/23: CT CAP: 1. There is no definite evidence of metastatic disease within the chest, abdomen, or pelvis.  2. Soft tissue density within the subcutaneous tissues of the right inguinal region is in area of previous cystic fluid collection likely representing post surgical changes with granulation tissue. Possibility of local recurrence within the abdominal wall is not completely excluded and continued close interval follow-up is recommended in approximately 6 months.    -03/14/24: CT CAP: infiltration of fat adjacent to sigmoid colon is non specific. Right ventral inguinal soft tissue thickening is again noted and stable.     Interval History: 3/21/24  -CT reviewed  -underwent a L hip replacement   -Had a R groin abscess that appears to be draining. No tenderness, erythema or fevers  -No abdominal distension  -Energy is good  -No chest pain or dyspnea     Review of Systems: 12 Point ROS was completed and pertinent positives are in the HPI    Current Outpatient Medications on File Prior to Visit   Medication Sig Dispense Refill    letrozole 2.5 MG Oral Tab Take 1 tablet (2.5 mg total) by mouth daily. 90 tablet 0    amLODIPine 5 MG Oral Tab Take 1 tablet (5 mg total) by mouth daily. 90 tablet 0    calcium carbonate 500 MG Oral Chew Tab Chew 1 tablet (500 mg total) by mouth daily as needed for Heartburn.      levothyroxine (SYNTHROID) 100 MCG Oral Tab Take 1 tablet (100 mcg total) by mouth every morning before breakfast. 90 tablet 3    NON FORMULARY Take 1 capsule by mouth daily. [employer] supplement       Current Facility-Administered Medications on File Prior to Visit   Medication Dose Route Frequency Provider Last Rate Last Admin    [COMPLETED] iopamidol (ISOVUE-370) 76 % injection 100 mL  100 mL Intravenous ONCE PRN Debi Zamora MD   100 mL at 03/14/24 1553     Past Medical History:   Diagnosis Date    Cancer (HCC)     abdominal tumor/ uterine cancer 1999, abdominal wall 2021    High blood  pressure     Hypothyroidism     post surgical    Osteoarthritis     bilateral knees    Personal history of antineoplastic chemotherapy 05/2022    \"chemo wash\" with surgery    PONV (postoperative nausea and vomiting)     post colonoscopy    Prediabetes     Pulmonary embolism (HCC)     7/2021-getting filter put in 8/5/21    Visual impairment     contacts and glasses    Wears glasses 1990     Past Surgical History:   Procedure Laterality Date    APPENDECTOMY      APPENDECTOMY      COLONOSCOPY      FINGER SPLINT Left     pinky finger    HYSTERECTOMY      total    OTHER  08/10/2021    Radical resection of right lower abdominal wall and proximal right thigh metastatic carcinoma with sartorius fascia flap transposition and  repair of resultant defect with mesh. Use of SPY to evaluate tissue perfusion.    OTHER  05/17/2022    Cytoreductive surgery to include resection of left abdominal wall tumor and omentectomy. Hyperthermic intraperitoneal chemotherapy with Cisplatin (100 mg/m2)    OTHER SURGICAL HISTORY  01/2023    total thyriodectomy    PERC PLACEMENT OF IVC FILTER  08/05/2021    TOTAL ABDOM HYSTERECTOMY  1999    with BSO    WRIST FRACTURE SURGERY Right 12/2021     Social History     Socioeconomic History    Marital status: Single   Tobacco Use    Smoking status: Never    Smokeless tobacco: Never   Vaping Use    Vaping Use: Never used   Substance and Sexual Activity    Alcohol use: Yes     Alcohol/week: 1.0 standard drink of alcohol     Types: 1 Cans of beer per week     Comment: social- few drinks per month    Drug use: Never      Family History   Problem Relation Age of Onset    Cancer Father         lung CA       Physical Exam  Height: --  Weight: 92.2 kg (203 lb 3.2 oz) (03/21 0852)  BSA (Calculated - sq m): --  Pulse: 107 (03/21 0852)  BP: 164/84 (03/21 0852)  Temp: 97.6 °F (36.4 °C) (03/21 0852)  Do Not Use - Resp Rate: --  SpO2: 95 % (03/21 0852)     General: NAD, AOX3  HEENT: clear op, mmm, no jvd, no scleral  icterus  CV: RRR S1S2 no murmurs  Extremities: No edema  Lungs: CTAB, no increased work of breathing  Abd: soft nt nd +BS, lower quadrant abdominal nodularity stable.  Surgical scars. R groin abscess draining   Neuro: CN: II-XII grossly intact      Results:  Lab Results   Component Value Date    WBC 10.4 03/21/2024    HGB 12.7 03/21/2024    HCT 39.3 03/21/2024    MCV 85.6 03/21/2024    .0 03/21/2024     Lab Results   Component Value Date     08/02/2023    K 3.7 08/02/2023    CO2 24.0 08/02/2023     08/02/2023    BUN 15 08/02/2023    PHOS 3.1 05/18/2022    ALB 4.0 08/02/2023       No results found for: \"LDH\"    Radiology: reviewed     Pathology:   7/7/21: Abdominal Wall Bx      Final Diagnosis:   Abdominal wall mass, biopsy:  -Atypical endometrioid proliferation.  -See comment.      Electronically signed by Inocencio Renee MD on 7/8/2021 at 1556       Final Diagnosis Comment      Sections show cores of fibroadipose tissue tissue with hemosiderin deposition and small areas of atypical glandular proliferation with focal squamous morules.   Immunostains for estrogen receptor (ER) and GATA3 were performed.  The lesional cells are positive for ER and negative for GATA3.     The histologic and immunohistochemical findings are consistent with an atypical endometrioid proliferation,  suspicious for endometrioid adenocarcinoma.  This could be arising in a background of endometriosis (favored) or could represent a metastasis.  Correlation with the clinical findings is recommended.        Assessment and Plan:  73F with a PMH of hearing impairment presented to the ER on 7/13/21 with a PE.    Endometrioid adenocarcinoma: Grade 1 ER positive and GATA3 negative  -She reports a history of a total abdominal hysterectomy about 25 years ago with a possible malignancy.  -She noticed a right lower quadrant mass in June 2021 after 65 pounds of intentional weight loss.  It is unclear on how long this mass has been  present.  -S/P resection of RLQ abdominal wall mass on 8/10/21 with Dr. Strickland. Mass was 13.5 cm, negative margins and Grade 1.   -Given that she had a distant recurrence years after her initial diagnosis, we discussed that she does have risk of future recurrence, however, this risk is difficult to predict. Given that she has ER+ disease, I recommended adjuvant aromatase inhibitor (I.e. letrozole 2.5 mg daily)   -We will likely continue her letrozole indefinitely given that she had a wall abdominal wall is likely metastatic from her uterus.  -Letrozole 2.5 mg daily started on 9/10/21.   -Repeat imaging after letrozole started showed slight enlargement on LLQ nodule. This nodule appears to have enlarged by only a few mm. In retrospect, it appears to have been present on old films. We discussed the case in tumor board and recommend surgical excision.  This was excised on 5/17/2022.  This had the same pathology as her initial sample.  She restarted letrozole afterwards  -Most recent imaging with stable findings. ESA. R inguinal region previously evaluated in tumor board and consistent with post-operative changes   -CBC, CMP,  q6 months  -q6 month CT CAP    R Groin abscess: draining. Follow up with PCP     RUL PE: Dx 7/13/21  -Likely provoked by BMI and active malignancy   -Dopplers negative   -Baseline D-dimer 3.87   -S/P temporary IVC filter which was removed 9/23/21   -Completed 3 months of anticoagulation (Xarelto) around 10/17/21  -Avoid estrogen containing agents, tamoxifen given VTE risk  -Post op DVT ppx     CBC reviewed: Anemia resolved. WBC normal.     Osteopenia: Repeat in 12/2022. On Calcium/Vitamin D. Following with PCP. Repeat DEXA ordered    RTC in 6 months     TIANA Zamora MD  [facility]

## 2024-03-21 ENCOUNTER — OFFICE VISIT (OUTPATIENT)
Dept: HEMATOLOGY/ONCOLOGY | Facility: HOSPITAL | Age: 74
End: 2024-03-21
Attending: INTERNAL MEDICINE
Payer: MEDICARE

## 2024-03-21 VITALS
SYSTOLIC BLOOD PRESSURE: 164 MMHG | DIASTOLIC BLOOD PRESSURE: 84 MMHG | BODY MASS INDEX: 35 KG/M2 | TEMPERATURE: 98 F | OXYGEN SATURATION: 95 % | WEIGHT: 203.19 LBS | RESPIRATION RATE: 16 BRPM | HEART RATE: 107 BPM

## 2024-03-21 DIAGNOSIS — Z79.811 LONG TERM (CURRENT) USE OF AROMATASE INHIBITORS: ICD-10-CM

## 2024-03-21 DIAGNOSIS — C55 METASTASIS FROM MALIGNANT NEOPLASM OF UTERUS (HCC): ICD-10-CM

## 2024-03-21 DIAGNOSIS — D50.9 IRON DEFICIENCY ANEMIA, UNSPECIFIED IRON DEFICIENCY ANEMIA TYPE: Primary | ICD-10-CM

## 2024-03-21 DIAGNOSIS — C79.9 METASTASIS FROM MALIGNANT NEOPLASM OF UTERUS (HCC): ICD-10-CM

## 2024-03-21 DIAGNOSIS — R97.1 ELEVATED CANCER ANTIGEN 125 (CA 125): ICD-10-CM

## 2024-03-21 LAB
ALBUMIN SERPL-MCNC: 4 G/DL (ref 3.4–5)
ALBUMIN/GLOB SERPL: 1.1 {RATIO} (ref 1–2)
ALP LIVER SERPL-CCNC: 71 U/L
ALT SERPL-CCNC: 17 U/L
ANION GAP SERPL CALC-SCNC: 4 MMOL/L (ref 0–18)
AST SERPL-CCNC: 12 U/L (ref 15–37)
BASOPHILS # BLD AUTO: 0.09 X10(3) UL (ref 0–0.2)
BASOPHILS NFR BLD AUTO: 0.9 %
BILIRUB SERPL-MCNC: 0.4 MG/DL (ref 0.1–2)
BUN BLD-MCNC: 16 MG/DL (ref 9–23)
CALCIUM BLD-MCNC: 9.5 MG/DL (ref 8.5–10.1)
CANCER AG125 SERPL-ACNC: 6.4 U/ML (ref ?–35)
CHLORIDE SERPL-SCNC: 108 MMOL/L (ref 98–112)
CO2 SERPL-SCNC: 26 MMOL/L (ref 21–32)
CREAT BLD-MCNC: 0.85 MG/DL
EGFRCR SERPLBLD CKD-EPI 2021: 72 ML/MIN/1.73M2 (ref 60–?)
EOSINOPHIL # BLD AUTO: 0.21 X10(3) UL (ref 0–0.7)
EOSINOPHIL NFR BLD AUTO: 2 %
ERYTHROCYTE [DISTWIDTH] IN BLOOD BY AUTOMATED COUNT: 12.8 %
FASTING STATUS PATIENT QL REPORTED: NO
GLOBULIN PLAS-MCNC: 3.8 G/DL (ref 2.8–4.4)
GLUCOSE BLD-MCNC: 139 MG/DL (ref 70–99)
HCT VFR BLD AUTO: 39.3 %
HGB BLD-MCNC: 12.7 G/DL
IMM GRANULOCYTES # BLD AUTO: 0.09 X10(3) UL (ref 0–1)
IMM GRANULOCYTES NFR BLD: 0.9 %
LYMPHOCYTES # BLD AUTO: 2.38 X10(3) UL (ref 1–4)
LYMPHOCYTES NFR BLD AUTO: 22.9 %
MCH RBC QN AUTO: 27.7 PG (ref 26–34)
MCHC RBC AUTO-ENTMCNC: 32.3 G/DL (ref 31–37)
MCV RBC AUTO: 85.6 FL
MONOCYTES # BLD AUTO: 0.64 X10(3) UL (ref 0.1–1)
MONOCYTES NFR BLD AUTO: 6.2 %
NEUTROPHILS # BLD AUTO: 6.98 X10 (3) UL (ref 1.5–7.7)
NEUTROPHILS # BLD AUTO: 6.98 X10(3) UL (ref 1.5–7.7)
NEUTROPHILS NFR BLD AUTO: 67.1 %
OSMOLALITY SERPL CALC.SUM OF ELEC: 289 MOSM/KG (ref 275–295)
PLATELET # BLD AUTO: 278 10(3)UL (ref 150–450)
POTASSIUM SERPL-SCNC: 4.1 MMOL/L (ref 3.5–5.1)
PROT SERPL-MCNC: 7.8 G/DL (ref 6.4–8.2)
RBC # BLD AUTO: 4.59 X10(6)UL
SODIUM SERPL-SCNC: 138 MMOL/L (ref 136–145)
WBC # BLD AUTO: 10.4 X10(3) UL (ref 4–11)

## 2024-03-21 PROCEDURE — G2211 COMPLEX E/M VISIT ADD ON: HCPCS | Performed by: INTERNAL MEDICINE

## 2024-03-21 PROCEDURE — 99215 OFFICE O/P EST HI 40 MIN: CPT | Performed by: INTERNAL MEDICINE

## 2024-03-21 NOTE — PROGRESS NOTES
Patient here for follow-up. Taking letrozole as prescribed. Had recent CT. Would like to discuss results in depth. States she has a new raised skin bump to her right lower abdomen that is draining a serosanguinous fluid for the past two months. Denies any fevers.

## 2024-04-17 DIAGNOSIS — I10 PRIMARY HYPERTENSION: ICD-10-CM

## 2024-04-17 RX ORDER — AMLODIPINE BESYLATE 5 MG/1
5 TABLET ORAL DAILY
Qty: 90 TABLET | Refills: 0 | Status: SHIPPED | OUTPATIENT
Start: 2024-04-17 | End: 2025-04-12

## 2024-04-17 NOTE — TELEPHONE ENCOUNTER
Protocol FAIL    Requesting: amLODIPine 5 MG Oral Tab     LOV: 8/7/23  RTC: 3 months  Filled: 1/17/24 90 tablet 0 refill  Recent Labs:     Upcoming OV   Future Appointments   Date Time Provider Department Center   4/25/2024 11:30 AM Zoe Muro DO [facility] [place]

## 2024-04-25 ENCOUNTER — OFFICE VISIT (OUTPATIENT)
Dept: INTERNAL MEDICINE CLINIC | Facility: CLINIC | Age: 74
End: 2024-04-25
Payer: MEDICARE

## 2024-04-25 ENCOUNTER — LAB ENCOUNTER (OUTPATIENT)
Dept: LAB | Age: 74
End: 2024-04-25
Attending: INTERNAL MEDICINE
Payer: MEDICARE

## 2024-04-25 VITALS
BODY MASS INDEX: 35.2 KG/M2 | TEMPERATURE: 98 F | HEIGHT: 64 IN | WEIGHT: 206.19 LBS | RESPIRATION RATE: 16 BRPM | OXYGEN SATURATION: 99 % | SYSTOLIC BLOOD PRESSURE: 136 MMHG | HEART RATE: 108 BPM | DIASTOLIC BLOOD PRESSURE: 80 MMHG

## 2024-04-25 DIAGNOSIS — R73.03 PREDIABETES: ICD-10-CM

## 2024-04-25 DIAGNOSIS — E78.2 MIXED HYPERLIPIDEMIA: ICD-10-CM

## 2024-04-25 DIAGNOSIS — E66.01 CLASS 2 SEVERE OBESITY WITH SERIOUS COMORBIDITY AND BODY MASS INDEX (BMI) OF 35.0 TO 35.9 IN ADULT, UNSPECIFIED OBESITY TYPE (HCC): ICD-10-CM

## 2024-04-25 DIAGNOSIS — L02.92 BOIL: ICD-10-CM

## 2024-04-25 DIAGNOSIS — I10 PRIMARY HYPERTENSION: ICD-10-CM

## 2024-04-25 DIAGNOSIS — Z00.00 ROUTINE PHYSICAL EXAMINATION: Primary | ICD-10-CM

## 2024-04-25 DIAGNOSIS — C78.6 PERITONEAL CARCINOMATOSIS (HCC): ICD-10-CM

## 2024-04-25 DIAGNOSIS — D50.8 OTHER IRON DEFICIENCY ANEMIA: ICD-10-CM

## 2024-04-25 DIAGNOSIS — M85.80 OSTEOPENIA, UNSPECIFIED LOCATION: ICD-10-CM

## 2024-04-25 DIAGNOSIS — E89.0 POSTOPERATIVE HYPOTHYROIDISM: ICD-10-CM

## 2024-04-25 DIAGNOSIS — Z12.31 ENCOUNTER FOR SCREENING MAMMOGRAM FOR MALIGNANT NEOPLASM OF BREAST: ICD-10-CM

## 2024-04-25 PROBLEM — D64.89 OTHER SPECIFIED ANEMIAS: Status: RESOLVED | Noted: 2023-07-07 | Resolved: 2024-04-25

## 2024-04-25 PROBLEM — M16.12 OSTEOARTHRITIS OF LEFT HIP: Status: RESOLVED | Noted: 2023-08-24 | Resolved: 2024-04-25

## 2024-04-25 PROBLEM — I82.90 VTE (VENOUS THROMBOEMBOLISM): Status: RESOLVED | Noted: 2023-08-24 | Resolved: 2024-04-25

## 2024-04-25 PROBLEM — K63.5 POLYP OF COLON: Status: RESOLVED | Noted: 2021-11-22 | Resolved: 2024-04-25

## 2024-04-25 PROBLEM — E66.812 CLASS 2 SEVERE OBESITY WITH SERIOUS COMORBIDITY AND BODY MASS INDEX (BMI) OF 35.0 TO 35.9 IN ADULT (HCC): Status: ACTIVE | Noted: 2024-04-25

## 2024-04-25 PROBLEM — Z86.718 HISTORY OF DVT (DEEP VEIN THROMBOSIS): Status: ACTIVE | Noted: 2024-04-25

## 2024-04-25 PROBLEM — K57.30 COLON, DIVERTICULOSIS: Status: RESOLVED | Noted: 2021-11-22 | Resolved: 2024-04-25

## 2024-04-25 LAB
EST. AVERAGE GLUCOSE BLD GHB EST-MCNC: 128 MG/DL (ref 68–126)
HBA1C MFR BLD: 6.1 % (ref ?–5.7)
T4 FREE SERPL-MCNC: 1.3 NG/DL (ref 0.8–1.7)
TSI SER-ACNC: 8.73 MIU/ML (ref 0.55–4.78)

## 2024-04-25 PROCEDURE — 84443 ASSAY THYROID STIM HORMONE: CPT

## 2024-04-25 PROCEDURE — 36415 COLL VENOUS BLD VENIPUNCTURE: CPT

## 2024-04-25 PROCEDURE — 83036 HEMOGLOBIN GLYCOSYLATED A1C: CPT

## 2024-04-25 PROCEDURE — 84439 ASSAY OF FREE THYROXINE: CPT

## 2024-04-25 NOTE — PATIENT INSTRUCTIONS
Continue to exercise at least 150 minutes a week and Eat a plant based diet     Please take 2000 IU of vitamin D daily for life to keep your bones strong     I do not recommend stopping your thyroid medication, but you are insistent. We discussed about the side effects of untreated hypothyroid medication. Please have blood work done today and then again in 4-6 weeks     Please follow up with the surgeon for the boil    I highly recommend to get the mammogram and a colonoscopy     I will see you back in 6 weeks

## 2024-04-25 NOTE — PROGRESS NOTES
Patient Office Visit    ASSESSMENT AND PLAN:   1. Routine physical examination  Note: Continue to exercise at least 150 minutes a week and Eat a plant based diet. Please take 2000 IU of vitamin D daily for life to keep your bones strong. Please see a dermatologist yearly for skin checks. Please see your dentist every 6 months.  Continue with regular eye exams.  Patient does not want a mammogram done however it was highly recommended.  She also declines a colonoscopy.    2. Primary hypertension  Note: Continue amlodipine.  Offered to change the medicine to a diuretic as amlodipine can cause lower extremity edema however patient declined and wants to continue this medicine    3. Mixed hyperlipidemia  Note: Diet controlled and has declined statins if needed    4. Prediabetes  Note: Will repeat labs and continue with diet and lifestyle modifications  - Hemoglobin A1C [E]; Future    5. Postoperative hypothyroidism  Note: Patient is adamant thinking that the levothyroxine is the cause of her obesity.  Discussed with patient that uncontrolled hypothyroidism can cause serious symptoms and it is not recommended that she stop it.  Patient wants to stop it and repeat blood work.  Will recheck thyroid levels today and repeated again in 4 to 6 weeks.  If she has any symptoms of worsening swelling or changes in mentation then she will go to the ER.  - TSH W Reflex To Free T4 [E]; Future  - TSH W Reflex To Free T4 [E]; Future    6. Osteopenia, unspecified location  Note: Continue vitamin D supplement.  Patient has declined alendronate in the past    7. Other iron deficiency anemia  Note: Resolved and follows with her hematologist    8. Peritoneal carcinomatosis (HCC)  Note: In remission and currently on letrozole    9. Encounter for screening mammogram for malignant neoplasm of breast  - [facility] MAMADOU 2D+3D SCREENING BILAT (CPT=77067/24266); Future    10. Boil  Note: Referral provided for further recommendations but for now recommended  to keep the area clean  - Surgery Referral - In Network    11. Class 2 severe obesity with serious comorbidity and body mass index (BMI) of 35.0 to 35.9 in adult, unspecified obesity type (HCC)  Note: Discussed with patient that diet and lifestyle modifications are important.  Even though she thinks that she is eating a low caloric diet but she has a lot of sweets in her diet.  She is not able to exercise much because of her knees and suggested stationary bikes or even water aerobics but patient does not want to continue with that.  She feels that the thyroid medicine is the cause of her problems.  Will stop the medicine and repeat labs again in 4-6    Return to clinic in 4 to 6 weeks    Patient/Caregiver Education: Patient/Caregiver Education: There are no barriers to learning. Medical education done. Outcome: Patient verbalizes understanding. Patient is notified to call with any questions, complications, allergies, or worsening or changing symptoms.  Patient is to call with any side effects or complications from the treatments as a result of today.      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)    History of pulmonary embolism    Internal hemorrhoids    Peritoneal carcinomatosis (HCC)    Osteopenia    Encounter for preoperative screening laboratory testing for COVID-19 virus    H/O total thyroidectomy    Postoperative hypothyroidism    Iron deficiency anemia    Primary hypertension    Osteoarthritis of left hip    Status post total replacement of left hip    History of DVT (deep vein thrombosis)       Orders Placed This Encounter   Procedures    TSH W Reflex To Free T4 [E]     Standing Status:   Future     Number of Occurrences:   1     Standing Expiration Date:   4/25/2025     Order Specific Question:   Release to patient     Answer:   Immediate    TSH W Reflex To Free T4 [E]      Standing Status:   Future     Standing Expiration Date:   4/25/2025     Order Specific Question:   Release to patient     Answer:   Immediate    Hemoglobin A1C [E]     Standing Status:   Future     Number of Occurrences:   1     Standing Expiration Date:   4/25/2025     Order Specific Question:   Release to patient     Answer:   Immediate     Requested Prescriptions      No prescriptions requested or ordered in this encounter         Zoe John Muro DO  CC:  Chief Complaint   Patient presents with    Physical         HPI:   Viola Lindsay is a 73 year old female who presents fr a physical     Obesity: patient feels that her weight gain is caused by her thyroid medicine.  She is very upset that when she came to establish care here she only had cancer but now the cancer has resolved and now she has high blood pressure and thyroid problems.  She feels that the thyroid medicine is causing all of her weight gain.  She tries to walk every day but is unable to walk much because of her knee pain and may need knee replacement.  In regards to her diet she tries to keep her caloric intake to less than 1000.  She has been doing this for years and was able to lose the weight in the past but not anymore.  She does not want to consider any medications neither does she want to see a nutritionist for assistance.  In regards to what she eats she does mention that she eats doughnuts and chocolates.  She does not think this is the cause of her weight gain.  Hypertension: She has been compliant with her medication  Discharge: She noticed a lump in the abdomen about 3 months ago and since then the area has been oozing clear fluids.  She does not feel that it hurts but it just has some irritation around.  She has been doing better since having the hip surgery    Past Medical History:    Cancer (HCC)    abdominal tumor/ uterine cancer 1999, abdominal wall 2021    High blood pressure    Hypothyroidism    post surgical     Osteoarthritis    bilateral knees    Personal history of antineoplastic chemotherapy    \"chemo wash\" with surgery    PONV (postoperative nausea and vomiting)    post colonoscopy    Prediabetes    Pulmonary embolism (HCC)    7/2021-getting filter put in 8/5/21    Visual impairment    contacts and glasses    Wears glasses       Past Surgical History:   Procedure Laterality Date    Appendectomy      Appendectomy      Colonoscopy      Finger splint Left     pinky finger    Hysterectomy      total    Other  08/10/2021    Radical resection of right lower abdominal wall and proximal right thigh metastatic carcinoma with sartorius fascia flap transposition and  repair of resultant defect with mesh. Use of SPY to evaluate tissue perfusion.    Other  05/17/2022    Cytoreductive surgery to include resection of left abdominal wall tumor and omentectomy. Hyperthermic intraperitoneal chemotherapy with Cisplatin (100 mg/m2)    Other surgical history  01/2023    total thyriodectomy    Perc placement of ivc filter  08/05/2021    Total abdom hysterectomy  1999    with BSO    Wrist fracture surgery Right 12/2021       Social History:  Social History     Socioeconomic History    Marital status: Single   Tobacco Use    Smoking status: Never    Smokeless tobacco: Never   Vaping Use    Vaping status: Never Used   Substance and Sexual Activity    Alcohol use: Yes     Alcohol/week: 1.0 standard drink of alcohol     Types: 1 Cans of beer per week     Comment: social- few drinks per month    Drug use: Never   Other Topics Concern    Caffeine Concern No    Exercise No     Family History:  Family History   Problem Relation Age of Onset    Cancer Father         lung CA     Allergies:  Allergies   Allergen Reactions    Adhesive Tape RASH     Current Meds:  Current Outpatient Medications on File Prior to Visit   Medication Sig Dispense Refill    amLODIPine 5 MG Oral Tab Take 1 tablet (5 mg total) by mouth daily. 90 tablet 0    letrozole 2.5 MG  Oral Tab Take 1 tablet (2.5 mg total) by mouth daily. 90 tablet 0    NON FORMULARY Take 1 capsule by mouth daily. EYE Promise supplement      calcium carbonate 500 MG Oral Chew Tab Chew 1 tablet (500 mg total) by mouth daily as needed for Heartburn. (Patient not taking: Reported on 4/25/2024)       No current facility-administered medications on file prior to visit.         REVIEW OF SYSTEMS   Constitutional: no fatigue normal energy no weight changes   HENT: normal sinuses and no mouth issues   Eyes: . normal vision no eye pain   Respiratory: normal respirations no cough   Cardiovascular: no CP, or palpitations   Gastrointestinal: normal bowels and no abd pains   Genitourinary:  normal urination no hematuria, no frequency   Musculoskeletal: no pains in arms/legs, normal range of motion   Skin: as above   Neurological:  no weakness, no numbness, normal gait   Hematological:  no bruises or bleeding   Psychiatric/Behavioral: normal mood no anxiety normal behavior     /80 (BP Location: Right arm, Patient Position: Sitting, Cuff Size: adult)   Pulse 108   Temp 98 °F (36.7 °C) (Temporal)   Resp 16   Ht 5' 4\" (1.626 m)   Wt 206 lb 3.2 oz (93.5 kg)   SpO2 99%   BMI 35.39 kg/m²     PHYSICAL EXAM:   Constitutional: Vital signs reviewed as noted, well developed, in no acute distress.   HENT: NCAT, bilateral ear canal and tympanic membrane appear normal  Eyes: pupils reactive bilaterally  Neck: No thyroidmegaly  Cardiovascular: nl s1 s2, 2/6 ELROY heard best at the RUSB   Pulmonary/Chest: CTA bilaterally with no wheezes  Abdominal: Soft NT normal Bowel sounds  Musculoskeletal:  normal ROM in UE and LE  Extremities: no pedal edema   Neurological:  no weakness in UE and LE, reflexes are normal  Skin: appears like an abscess that has ruptured. There is some bloody discharge with induration noted   Psychiatric:normal mood

## 2024-05-01 ENCOUNTER — TELEPHONE (OUTPATIENT)
Dept: INTERNAL MEDICINE CLINIC | Facility: CLINIC | Age: 74
End: 2024-05-01

## 2024-05-01 DIAGNOSIS — E78.2 MIXED HYPERLIPIDEMIA: ICD-10-CM

## 2024-05-01 DIAGNOSIS — E89.0 H/O TOTAL THYROIDECTOMY: Primary | ICD-10-CM

## 2024-05-01 NOTE — TELEPHONE ENCOUNTER
Most of her blood work was already done.  The main blood tests that she needs the thyroid and the cholesterol.  She already had her kidney and liver function in March, blood count in March and an A1c in April.  Zoe Muro DO

## 2024-05-01 NOTE — TELEPHONE ENCOUNTER
Patient is requesting routine labs following Physical on 4/25/24. Stated she was informed orders would be placed but nothing in Epic.   Please advise.

## 2024-05-09 ENCOUNTER — TELEPHONE (OUTPATIENT)
Dept: ORTHOPEDICS CLINIC | Facility: CLINIC | Age: 74
End: 2024-05-09

## 2024-05-09 DIAGNOSIS — M25.561 RIGHT KNEE PAIN, UNSPECIFIED CHRONICITY: Primary | ICD-10-CM

## 2024-05-09 NOTE — TELEPHONE ENCOUNTER
XR ORDERED PER [facility].   XR NOT SCHEDULED YET PENDING [place] SCHEDULE OPENING UP.   WILL NOTIFY PATIENT TO EITHER ARRIVE EARLY FOR XR OR TO SCHEDULE PRIOR TO VISIT.

## 2024-05-10 ENCOUNTER — HOSPITAL ENCOUNTER (OUTPATIENT)
Dept: GENERAL RADIOLOGY | Age: 74
Discharge: HOME | End: 2024-05-10
Attending: PHYSICIAN ASSISTANT

## 2024-05-10 DIAGNOSIS — M25.561 RIGHT KNEE PAIN, UNSPECIFIED CHRONICITY: ICD-10-CM

## 2024-05-10 PROCEDURE — 73564 X-RAY EXAM KNEE 4 OR MORE: CPT | Performed by: PHYSICIAN ASSISTANT

## 2024-05-13 ENCOUNTER — OFFICE VISIT (OUTPATIENT)
Facility: CLINIC | Age: 74
End: 2024-05-13
Payer: MEDICARE

## 2024-05-13 VITALS — WEIGHT: 206.19 LBS | BODY MASS INDEX: 35.2 KG/M2 | HEIGHT: 64 IN

## 2024-05-13 DIAGNOSIS — M17.11 PRIMARY OSTEOARTHRITIS OF RIGHT KNEE: Primary | ICD-10-CM

## 2024-05-13 RX ORDER — TRIAMCINOLONE ACETONIDE 40 MG/ML
40 INJECTION, SUSPENSION INTRA-ARTICULAR; INTRAMUSCULAR ONCE
Status: COMPLETED | OUTPATIENT
Start: 2024-05-13 | End: 2024-05-13

## 2024-05-13 RX ADMIN — TRIAMCINOLONE ACETONIDE 40 MG: 40 INJECTION, SUSPENSION INTRA-ARTICULAR; INTRAMUSCULAR at 12:02:00

## 2024-05-13 NOTE — PROCEDURES
Patient reports great relief from the last knee injection until about 2 weeks ago.  She is doing very well from a left hip standpoint as well.  Updated radiographs were reviewed with the patient in clinic which demonstrate severe bone-on-bone medial compartmental joint space narrowing.    Risks and benefits of knee injection discussed with the patient, with risks including but not limited to pain and swelling at the injection site and/or within the knee joint, infection, elevation in blood pressure and/or glucose levels, facial flushing. After informed consent, the patient's right knee was marked, locally anesthetized with skin refrigerant, prepped with topical antiseptic, and injected with a mixture of 1mL 40mg/mL Kenalog, 2mL 1% lidocaine and 2mL 0.5% marcaine through the inferolateral portal.  A band-aid was applied.  The patient tolerated the procedure well.    Kevin Johnson PA-C  [facility]

## 2024-05-22 ENCOUNTER — OFFICE VISIT (OUTPATIENT)
Dept: SURGERY | Facility: CLINIC | Age: 74
End: 2024-05-22

## 2024-05-22 VITALS — TEMPERATURE: 97 F | HEART RATE: 92 BPM

## 2024-05-22 DIAGNOSIS — L91.8 HYPERTROPHIC GRANULATION TISSUE: Primary | ICD-10-CM

## 2024-05-22 DIAGNOSIS — T81.30XA WOUND DISRUPTION, INITIAL ENCOUNTER: ICD-10-CM

## 2024-05-22 PROCEDURE — 99203 OFFICE O/P NEW LOW 30 MIN: CPT | Performed by: SURGERY

## 2024-05-22 PROCEDURE — 17250 CHEM CAUT OF GRANLTJ TISSUE: CPT | Performed by: SURGERY

## 2024-05-22 PROCEDURE — 97597 DBRDMT OPN WND 1ST 20 CM/<: CPT | Performed by: SURGERY

## 2024-05-22 NOTE — H&P
New Patient Visit Note       Active Problems      1. Hypertrophic granulation tissue        Chief Complaint   Chief Complaint   Patient presents with    New Patient     NP- Boil in Right Groin - pt reports drainage and bleeding, denies pain        History of Present Illness     The patient presents for evaluation of an open lesion in the right groin.  The patient has an extensive past surgical history as enumerated below.  Patient states that in the area of concern there was a surgical drain from her HIPEC  procedure.  Recently the patient noted blood-tinged drainage from the area and the patient presented to her primary care physician who then requested surgical evaluation.    The patient denies fever, chills, chest pain, shortness of breath, dyspnea. The patient also denies hematemesis, melena, or hematochezia. The patient denies change in bowel or bladder habits. There is no complaint of hematuria or dysuria.    I discussed the nature of the lesion with the patient.  There is hypertrophic granulation tissue and an area of likely prior surgical drain.  I discussed the natural history of wound healing and the process of wound care management she will provide to herself at home.  The patient voiced understanding and is agreeable to proceed.    Allergies  Viola is allergic to adhesive tape.    Past Medical / Surgical / Social / Family History    The past medical and past surgical history have been reviewed by me today.    Past Medical History:    Cancer (HCC)    abdominal tumor/ uterine cancer 1999, abdominal wall 2021    High blood pressure    Hypothyroidism    post surgical    Osteoarthritis    bilateral knees    Personal history of antineoplastic chemotherapy    \"chemo wash\" with surgery    PONV (postoperative nausea and vomiting)    post colonoscopy    Prediabetes    Pulmonary embolism (HCC)    7/2021-getting filter put in 8/5/21    Visual impairment    contacts and glasses    Wears glasses     Past Surgical  History:   Procedure Laterality Date    Appendectomy      Appendectomy      Colonoscopy      Finger splint Left     pinky finger    Hysterectomy      total    Other  08/10/2021    Radical resection of right lower abdominal wall and proximal right thigh metastatic carcinoma with sartorius fascia flap transposition and  repair of resultant defect with mesh. Use of SPY to evaluate tissue perfusion.    Other  05/17/2022    Cytoreductive surgery to include resection of left abdominal wall tumor and omentectomy. Hyperthermic intraperitoneal chemotherapy with Cisplatin (100 mg/m2)    Other surgical history  01/2023    total thyriodectomy    Perc placement of ivc filter  08/05/2021    Total abdom hysterectomy  1999    with BSO    Wrist fracture surgery Right 12/2021       The family history and social history have been reviewed by me today.    Family History   Problem Relation Age of Onset    Cancer Father         lung CA     Social History     Socioeconomic History    Marital status: Single   Tobacco Use    Smoking status: Never    Smokeless tobacco: Never   Vaping Use    Vaping status: Never Used   Substance and Sexual Activity    Alcohol use: Yes     Alcohol/week: 1.0 standard drink of alcohol     Types: 1 Cans of beer per week     Comment: social- few drinks per month    Drug use: Never   Other Topics Concern    Caffeine Concern No    Exercise No        Current Outpatient Medications:     amLODIPine 5 MG Oral Tab, Take 1 tablet (5 mg total) by mouth daily., Disp: 90 tablet, Rfl: 0    letrozole 2.5 MG Oral Tab, Take 1 tablet (2.5 mg total) by mouth daily., Disp: 90 tablet, Rfl: 0    calcium carbonate 500 MG Oral Chew Tab, Chew 1 tablet (500 mg total) by mouth daily as needed for Heartburn., Disp: , Rfl:     NON FORMULARY, Take 1 capsule by mouth daily. EYE Promise supplement, Disp: , Rfl:       Review of Systems  The Review of Systems has been reviewed by me during today.  Review of Systems   Constitutional:  Negative  for chills, diaphoresis, fatigue, fever and unexpected weight change.   HENT:  Negative for hearing loss, nosebleeds, sore throat and trouble swallowing.    Respiratory:  Negative for apnea, cough, shortness of breath and wheezing.    Cardiovascular:  Negative for chest pain, palpitations and leg swelling.   Gastrointestinal:  Negative for abdominal distention, abdominal pain, anal bleeding, blood in stool, constipation, diarrhea, nausea and vomiting.   Genitourinary:  Negative for difficulty urinating, dysuria, frequency and urgency.   Musculoskeletal:  Negative for arthralgias and myalgias.   Skin:  Negative for color change and rash.   Neurological:  Negative for tremors, syncope and weakness.   Hematological:  Negative for adenopathy. Does not bruise/bleed easily.   Psychiatric/Behavioral:  Negative for behavioral problems and sleep disturbance.        Physical Findings   Pulse 92   Temp 97.2 °F (36.2 °C) (Temporal)   Physical Exam  Vitals and nursing note reviewed.   Constitutional:       General: She is not in acute distress.     Appearance: Normal appearance. She is well-developed.   HENT:      Head: Normocephalic and atraumatic.   Eyes:      General: No scleral icterus.     Conjunctiva/sclera: Conjunctivae normal.   Neck:      Trachea: No tracheal deviation.   Cardiovascular:      Rate and Rhythm: Normal rate and regular rhythm.      Heart sounds: S1 normal and S2 normal. No murmur heard.  Pulmonary:      Effort: No accessory muscle usage or respiratory distress.      Breath sounds: No decreased breath sounds, wheezing, rhonchi or rales.   Abdominal:      General: There is no distension or abdominal bruit.      Palpations: Abdomen is soft. Abdomen is not rigid. There is no shifting dullness, fluid wave, hepatomegaly, splenomegaly, mass or pulsatile mass.      Tenderness: There is no abdominal tenderness. There is no guarding or rebound. Negative signs include Sheehan's sign and McBurney's sign.      Hernia:  There is no hernia in the umbilical area or ventral area.   Skin:     General: Skin is warm and dry.          Neurological:      Mental Status: She is alert and oriented to person, place, and time.   Psychiatric:         Speech: Speech normal.         Behavior: Behavior normal.         Thought Content: Thought content normal.         Judgment: Judgment normal.             Assessment   1. Hypertrophic granulation tissue          Plan     The patient has an open wound with hypertrophic granulation tissue and an area of prior surgical drain.    Sharp debridement and chemical cauterization performed in the office uneventfully.    Wound care instructions discussed with patient and demonstrated.    The patient will follow-up in my office at weekly intervals until the wound is completely healed.  She will see my physician assistant alternating with appointments with me.    The patient was provided ample opportunity to ask questions.  All of the patient's questions were answered in detail.  The patient voiced understanding of the care plan.     No orders of the defined types were placed in this encounter.      Imaging & Referrals   None    Follow Up  No follow-ups on file.    Tony Stark MD

## 2024-05-22 NOTE — PROCEDURES
[facility]    Viola Lindsay Patient Status:  No patient class for patient encounter    6/3/1950 MRN [id number]   Location [facility], [address] [place] [address] Attending No att. providers found   Hosp Day # 0 PCP Zoe Muro      Viola Lindsay is a 73 year old female patient.  1. Hypertrophic granulation tissue    2. Wound disruption, initial encounter      Past Medical History:    Cancer (HCC)    abdominal tumor/ uterine cancer , abdominal wall     High blood pressure    Hypothyroidism    post surgical    Osteoarthritis    bilateral knees    Personal history of antineoplastic chemotherapy    \"chemo wash\" with surgery    PONV (postoperative nausea and vomiting)    post colonoscopy    Prediabetes    Pulmonary embolism (HCC)    2021-getting filter put in 21    Visual impairment    contacts and glasses    Wears glasses     Pulse 92, temperature 97.2 °F (36.2 °C), temperature source Temporal, not currently breastfeeding.    General Procedure    Date/Time: 2024 9:44 AM    Performed by: Tony Stark MD  Authorized by: Tony Stark MD  Preparation: Patient was prepped and draped in the usual sterile fashion.  Local anesthesia used: no    Anesthesia:  Local anesthesia used: no    Sedation:  Patient sedated: no    Patient tolerance: patient tolerated the procedure well with no immediate complications  Comments: The right lower abdomen was prepped and draped in usual sterile fashion.    Hypertrophic granulation tissue is insensate; sharp debridement of devitalized tissue achieved using forceps and scissors.    Hypertrophic granulation tissue chemically cauterized with silver nitrate.    Total wound dimensions 1.5 cm x 1.5 cm x 1.5 cm.    Minimal bleeding controlled with digital pressure.    Wound packed with quarter inch gauze and covered with 4 x 4 gauze and paper tape.    Care plan discussed with patient.  Wound care recommendations discussed and  instruction provided; the patient voiced understanding.    The patient tolerated the procedure without apparent complication.    Follow-up in 1 week with my physician assistant followed by 1 week follow-up with me for wound check.  Further follow-up based on clinical progress.    The patient was provided ample opportunity to ask questions.  All of the patient's questions were answered in detail.  The patient voiced understanding of the care plan.          Tony Stark MD  5/22/2024

## 2024-05-29 ENCOUNTER — OFFICE VISIT (OUTPATIENT)
Facility: LOCATION | Age: 74
End: 2024-05-29

## 2024-05-29 VITALS — HEART RATE: 96 BPM | TEMPERATURE: 97 F

## 2024-05-29 DIAGNOSIS — Z51.89 VISIT FOR WOUND CHECK: ICD-10-CM

## 2024-05-29 DIAGNOSIS — L91.8 HYPERTROPHIC GRANULATION TISSUE: Primary | ICD-10-CM

## 2024-05-29 PROCEDURE — 99212 OFFICE O/P EST SF 10 MIN: CPT

## 2024-05-29 NOTE — PROGRESS NOTES
Follow Up Visit Note       Active Problems      1. Hypertrophic granulation tissue    2. Visit for wound check          Chief Complaint   Chief Complaint   Patient presents with    [facility]     EP- 1 week f/u wound check in right groin          History of Present Illness    Viola Lindsay is a 73-year-old female who presents to clinic for continued care and evaluation following debridement of open wound on 5/22/24 with Dr. Stark.     The patient reports doing well since her last visit.  She continues to pack the wound with iodoform gauze daily and apply a dry dressing over the area as needed for saturation.  She has noticed an increased amount of drainage throughout the night.  The drainage is mostly bloody or serous in nature.  She denies discomfort near the incision site.  She denies fevers or chills.    Allergies  Viola is allergic to adhesive tape.    Past Medical / Surgical / Social / Family History    The past medical and past surgical history have been reviewed by me today.    Past Medical History:    Cancer (HCC)    abdominal tumor/ uterine cancer 1999, abdominal wall 2021    High blood pressure    Hypothyroidism    post surgical    Osteoarthritis    bilateral knees    Personal history of antineoplastic chemotherapy    \"chemo wash\" with surgery    PONV (postoperative nausea and vomiting)    post colonoscopy    Prediabetes    Pulmonary embolism (HCC)    7/2021-getting filter put in 8/5/21    Visual impairment    contacts and glasses    Wears glasses     Past Surgical History:   Procedure Laterality Date    Appendectomy      Appendectomy      Colonoscopy      Finger splint Left     pinky finger    Hysterectomy      total    Other  08/10/2021    Radical resection of right lower abdominal wall and proximal right thigh metastatic carcinoma with sartorius fascia flap transposition and  repair of resultant defect with mesh. Use of SPY to evaluate tissue perfusion.    Other  05/17/2022    Cytoreductive  surgery to include resection of left abdominal wall tumor and omentectomy. Hyperthermic intraperitoneal chemotherapy with Cisplatin (100 mg/m2)    Other surgical history  01/2023    total thyriodectomy    Perc placement of ivc filter  08/05/2021    Total abdom hysterectomy  1999    with BSO    Wrist fracture surgery Right 12/2021       The family history and social history have been reviewed by me today.    Family History   Problem Relation Age of Onset    Cancer Father         lung CA     Social History     Socioeconomic History    Marital status: Single   Tobacco Use    Smoking status: Never    Smokeless tobacco: Never   Vaping Use    Vaping status: Never Used   Substance and Sexual Activity    Alcohol use: Yes     Alcohol/week: 1.0 standard drink of alcohol     Types: 1 Cans of beer per week     Comment: social- few drinks per month    Drug use: Never   Other Topics Concern    Caffeine Concern No    Exercise No        Current Outpatient Medications:     amLODIPine 5 MG Oral Tab, Take 1 tablet (5 mg total) by mouth daily., Disp: 90 tablet, Rfl: 0    letrozole 2.5 MG Oral Tab, Take 1 tablet (2.5 mg total) by mouth daily., Disp: 90 tablet, Rfl: 0    calcium carbonate 500 MG Oral Chew Tab, Chew 1 tablet (500 mg total) by mouth daily as needed for Heartburn., Disp: , Rfl:     NON FORMULARY, Take 1 capsule by mouth daily. EYE Promise supplement, Disp: , Rfl:      Review of Systems  The Review of Systems has been reviewed by me during today.  Review of Systems   Constitutional:  Negative for chills, diaphoresis, fatigue, fever and unexpected weight change.   HENT:  Negative for hearing loss, nosebleeds, sore throat and trouble swallowing.    Respiratory:  Negative for apnea, cough, shortness of breath and wheezing.    Cardiovascular:  Negative for chest pain, palpitations and leg swelling.   Gastrointestinal:  Negative for abdominal distention, abdominal pain, anal bleeding, blood in stool, constipation, diarrhea,  nausea and vomiting.   Genitourinary:  Negative for difficulty urinating, dysuria, frequency and urgency.   Musculoskeletal:  Negative for arthralgias and myalgias.   Skin:  Positive for wound. Negative for color change and rash.   Neurological:  Negative for tremors, syncope and weakness.   Hematological:  Negative for adenopathy. Does not bruise/bleed easily.   Psychiatric/Behavioral:  Negative for behavioral problems and sleep disturbance.         Physical Findings   Pulse 96   Temp 96.8 °F (36 °C) (Temporal)   Physical Exam  Constitutional:       Appearance: Normal appearance.   HENT:      Head: Normocephalic and atraumatic.   Eyes:      Extraocular Movements: Extraocular movements intact.      Pupils: Pupils are equal, round, and reactive to light.   Pulmonary:      Effort: Pulmonary effort is normal. No respiratory distress.   Abdominal:      General: Abdomen is flat. Bowel sounds are normal. There is no distension.      Palpations: Abdomen is soft. There is no mass.      Tenderness: There is no abdominal tenderness. There is no guarding or rebound.   Musculoskeletal:         General: Normal range of motion.      Cervical back: Normal range of motion.   Skin:     General: Skin is warm.      Coloration: Skin is not jaundiced or pale.      Findings: No erythema.             Comments: Open wound with healthy bed of granulation tissue.  Total wound dimensions are 1 X1 X1 centimeters.  Iodoform gauze was used to repack the wound.  A dry gauze was placed over the incision.   Neurological:      General: No focal deficit present.      Mental Status: She is alert and oriented to person, place, and time.          Assessment   1. Hypertrophic granulation tissue    2. Visit for wound check        Plan   Overall, the patient continues to do well. Her wound continues to heal appropriately.   I recommend she continue to pack the wound with iodoform gauze daily.  Continue to wash the incision site with soap and water  daily.  She is to follow-up with Dr. Stark next week for wound check.  All of the patient's questions and concerns were addressed.  She expressed understanding and is in agreement with this plan.     No orders of the defined types were placed in this encounter.      Imaging & Referrals   None    Follow Up  No follow-ups on file.    ROE Celestin

## 2024-05-31 RX ORDER — LETROZOLE 2.5 MG/1
2.5 TABLET, FILM COATED ORAL DAILY
Qty: 90 TABLET | Refills: 0 | Status: SHIPPED | OUTPATIENT
Start: 2024-05-31 | End: 2024-08-28

## 2024-06-06 ENCOUNTER — OFFICE VISIT (OUTPATIENT)
Facility: LOCATION | Age: 74
End: 2024-06-06
Payer: MEDICARE

## 2024-06-06 DIAGNOSIS — L91.8 HYPERTROPHIC GRANULATION TISSUE: Primary | ICD-10-CM

## 2024-06-06 NOTE — PROGRESS NOTES
Follow Up Visit Note       Active Problems      No diagnosis found.      Chief Complaint   Chief Complaint   Patient presents with    [facility]     EP - Hypertrophic granulation tissue 5/22/24, Right groin wound check,          History of Present Illness      See PA note  Allergies  Viola is allergic to adhesive tape.    Past Medical / Surgical / Social / Family History    The past medical and past surgical history have been reviewed by me today.    Past Medical History:    Cancer (HCC)    abdominal tumor/ uterine cancer 1999, abdominal wall 2021    High blood pressure    Hypothyroidism    post surgical    Osteoarthritis    bilateral knees    Personal history of antineoplastic chemotherapy    \"chemo wash\" with surgery    PONV (postoperative nausea and vomiting)    post colonoscopy    Prediabetes    Pulmonary embolism (HCC)    7/2021-getting filter put in 8/5/21    Visual impairment    contacts and glasses    Wears glasses     Past Surgical History:   Procedure Laterality Date    Appendectomy      Appendectomy      Colonoscopy      Finger splint Left     pinky finger    Hysterectomy      total    Other  08/10/2021    Radical resection of right lower abdominal wall and proximal right thigh metastatic carcinoma with sartorius fascia flap transposition and  repair of resultant defect with mesh. Use of SPY to evaluate tissue perfusion.    Other  05/17/2022    Cytoreductive surgery to include resection of left abdominal wall tumor and omentectomy. Hyperthermic intraperitoneal chemotherapy with Cisplatin (100 mg/m2)    Other surgical history  01/2023    total thyriodectomy    Perc placement of ivc filter  08/05/2021    Total abdom hysterectomy  1999    with BSO    Wrist fracture surgery Right 12/2021       The family history and social history have been reviewed by me today.    Family History   Problem Relation Age of Onset    Cancer Father         lung CA     Social History     Socioeconomic History    Marital  status: Single   Tobacco Use    Smoking status: Never    Smokeless tobacco: Never   Vaping Use    Vaping status: Never Used   Substance and Sexual Activity    Alcohol use: Yes     Alcohol/week: 1.0 standard drink of alcohol     Types: 1 Cans of beer per week     Comment: social- few drinks per month    Drug use: Never   Other Topics Concern    Caffeine Concern No    Exercise No        Current Outpatient Medications:     LETROZOLE 2.5 MG Oral Tab, TAKE 1 TABLET(2.5 MG) BY MOUTH DAILY, Disp: 90 tablet, Rfl: 0    amLODIPine 5 MG Oral Tab, Take 1 tablet (5 mg total) by mouth daily., Disp: 90 tablet, Rfl: 0    calcium carbonate 500 MG Oral Chew Tab, Chew 1 tablet (500 mg total) by mouth daily as needed for Heartburn., Disp: , Rfl:     NON FORMULARY, Take 1 capsule by mouth daily. EYE Promise supplement, Disp: , Rfl:      Review of Systems  The Review of Systems has been reviewed by me during today.  Review of Systems   Constitutional: Negative.    HENT: Negative.     Eyes: Negative.    Respiratory: Negative.     Cardiovascular: Negative.    Gastrointestinal: Negative.    Genitourinary: Negative.    Musculoskeletal: Negative.    Skin: Negative.    Neurological: Negative.    Psychiatric/Behavioral: Negative.          Physical Findings   There were no vitals taken for this visit.  Physical Exam     Assessment   No diagnosis found.    Plan        No orders of the defined types were placed in this encounter.      Imaging & Referrals   None    Follow Up  No follow-ups on file.    Tony Stark MD

## 2024-06-10 ENCOUNTER — LAB ENCOUNTER (OUTPATIENT)
Dept: LAB | Age: 74
End: 2024-06-10
Attending: INTERNAL MEDICINE
Payer: MEDICARE

## 2024-06-10 DIAGNOSIS — E89.0 H/O TOTAL THYROIDECTOMY: ICD-10-CM

## 2024-06-10 DIAGNOSIS — E78.2 MIXED HYPERLIPIDEMIA: ICD-10-CM

## 2024-06-10 LAB
CHOLEST SERPL-MCNC: 270 MG/DL (ref ?–200)
FASTING PATIENT LIPID ANSWER: YES
HDLC SERPL-MCNC: 66 MG/DL (ref 40–59)
LDLC SERPL CALC-MCNC: 177 MG/DL (ref ?–100)
NONHDLC SERPL-MCNC: 204 MG/DL (ref ?–130)
T4 FREE SERPL-MCNC: 0.4 NG/DL (ref 0.8–1.7)
TRIGL SERPL-MCNC: 151 MG/DL (ref 30–149)
TSI SER-ACNC: 69.88 MIU/ML (ref 0.55–4.78)
VLDLC SERPL CALC-MCNC: 31 MG/DL (ref 0–30)

## 2024-06-10 PROCEDURE — 84439 ASSAY OF FREE THYROXINE: CPT

## 2024-06-10 PROCEDURE — 36415 COLL VENOUS BLD VENIPUNCTURE: CPT

## 2024-06-10 PROCEDURE — 84443 ASSAY THYROID STIM HORMONE: CPT

## 2024-06-10 PROCEDURE — 80061 LIPID PANEL: CPT

## 2024-06-12 ENCOUNTER — OFFICE VISIT (OUTPATIENT)
Dept: INTERNAL MEDICINE CLINIC | Facility: CLINIC | Age: 74
End: 2024-06-12
Payer: MEDICARE

## 2024-06-12 VITALS
SYSTOLIC BLOOD PRESSURE: 160 MMHG | WEIGHT: 201.19 LBS | OXYGEN SATURATION: 95 % | DIASTOLIC BLOOD PRESSURE: 80 MMHG | HEIGHT: 64 IN | HEART RATE: 102 BPM | RESPIRATION RATE: 16 BRPM | BODY MASS INDEX: 34.35 KG/M2

## 2024-06-12 DIAGNOSIS — E78.2 MIXED HYPERLIPIDEMIA: ICD-10-CM

## 2024-06-12 DIAGNOSIS — E89.0 POSTOPERATIVE HYPOTHYROIDISM: Primary | ICD-10-CM

## 2024-06-12 DIAGNOSIS — T81.30XA WOUND DEHISCENCE: ICD-10-CM

## 2024-06-12 DIAGNOSIS — I10 PRIMARY HYPERTENSION: ICD-10-CM

## 2024-06-12 DIAGNOSIS — E66.01 CLASS 2 SEVERE OBESITY WITH SERIOUS COMORBIDITY AND BODY MASS INDEX (BMI) OF 35.0 TO 35.9 IN ADULT, UNSPECIFIED OBESITY TYPE (HCC): ICD-10-CM

## 2024-06-12 DIAGNOSIS — R73.03 PREDIABETES: ICD-10-CM

## 2024-06-12 PROCEDURE — 99214 OFFICE O/P EST MOD 30 MIN: CPT | Performed by: INTERNAL MEDICINE

## 2024-06-12 PROCEDURE — G2211 COMPLEX E/M VISIT ADD ON: HCPCS | Performed by: INTERNAL MEDICINE

## 2024-06-12 RX ORDER — LEVOTHYROXINE SODIUM 100 MCG
100 TABLET ORAL
Qty: 90 TABLET | Refills: 0 | Status: SHIPPED | OUTPATIENT
Start: 2024-06-12

## 2024-06-12 NOTE — PROGRESS NOTES
Patient Office Visit    ASSESSMENT AND PLAN:   1. Postoperative hypothyroidism  Note: Discussed with patient that given her levels he should be on the medicine.  We could try the brand-name medicine to see if that avoids the bloating.  She will continue the generic that she has at home and then  the new 1 when she is done.  Will recheck labs in 6 weeks  - SYNTHROID 100 MCG Oral Tab; Take 1 tablet (100 mcg total) by mouth before breakfast.  Dispense: 90 tablet; Refill: 0  - TSH W Reflex To Free T4 [E]; Future    2. Class 2 severe obesity with serious comorbidity and body mass index (BMI) of 35.0 to 35.9 in adult, unspecified obesity type (HCC)  Note: Discussed to continue with diet and lifestyle modifications.    3. Primary hypertension  Note: May have been elevated due to uncontrolled hypothyroid.  Continue amlodipine  - TSH W Reflex To Free T4 [E]; Future    4. Mixed hyperlipidemia  Note: Likely elevated due to uncontrolled hypothyroid.  Will repeat labs again  - Lipid Panel; Future    5. Prediabetes  Note: Will recheck A1c in clinic    6.  Wound dehiscence  Note: Has a follow-up appointment coming up with the surgeon    She declines a colonoscopy at this time but will readdress during next visit    Return to clinic in 6 weeks for follow-up          Patient/Caregiver Education: Patient/Caregiver Education: There are no barriers to learning. Medical education done. Outcome: Patient verbalizes understanding. Patient is notified to call with any questions, complications, allergies, or worsening or changing symptoms.  Patient is to call with any side effects or complications from the treatments as a result of today.      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)    History of pulmonary embolism    Internal hemorrhoids    Peritoneal carcinomatosis (HCC)    Osteopenia     Encounter for preoperative screening laboratory testing for COVID-19 virus    H/O total thyroidectomy    Postoperative hypothyroidism    Iron deficiency anemia    Primary hypertension    Status post total replacement of left hip    History of DVT (deep vein thrombosis)    Class 2 severe obesity with serious comorbidity and body mass index (BMI) of 35.0 to 35.9 in adult (HCC)    Hypertrophic granulation tissue    Wound disruption       Orders Placed This Encounter   Procedures    Lipid Panel     Standing Status:   Future     Standing Expiration Date:   6/12/2025    TSH W Reflex To Free T4 [E]     Standing Status:   Future     Standing Expiration Date:   6/12/2025     Order Specific Question:   Release to patient     Answer:   Immediate     Requested Prescriptions     Signed Prescriptions Disp Refills    SYNTHROID 100 MCG Oral Tab 90 tablet 0     Sig: Take 1 tablet (100 mcg total) by mouth before breakfast.         Zoe Muro DO  CC:  Chief Complaint   Patient presents with    Follow - Up     6 week f/u         HPI:   Viola Lindsay is a 74-year-old female who presents for 6-week follow-up    Open wound: Was seen by the surgeon and overall symptoms have improved.  She has a follow-up with him coming up next week  Hypothyroid: She saw her blood test and has restarted her thyroid medicine.  She did notice that after stopping the medicine she was not having much of the hair loss and bloating  Hypertension/hyperlipidemia: She noticed her numbers increased as well since stopping the thyroid medicine.  She is compliant with her blood pressure medicine  Obesity: She has lost weight than before but she has made changes to her diet and she is walking every day.  She continues to do intermittent fasting and she has stopped her donuts  Past Medical History:    Cancer (HCC)    abdominal tumor/ uterine cancer 1999, abdominal wall 2021    High blood pressure    Hypothyroidism    post surgical    Osteoarthritis     bilateral knees    Personal history of antineoplastic chemotherapy    \"chemo wash\" with surgery    PONV (postoperative nausea and vomiting)    post colonoscopy    Prediabetes    Pulmonary embolism (HCC)    7/2021-getting filter put in 8/5/21    Visual impairment    contacts and glasses    Wears glasses       Past Surgical History:   Procedure Laterality Date    Appendectomy      Appendectomy      Colonoscopy      Finger splint Left     pinky finger    Hysterectomy      total    Other  08/10/2021    Radical resection of right lower abdominal wall and proximal right thigh metastatic carcinoma with sartorius fascia flap transposition and  repair of resultant defect with mesh. Use of SPY to evaluate tissue perfusion.    Other  05/17/2022    Cytoreductive surgery to include resection of left abdominal wall tumor and omentectomy. Hyperthermic intraperitoneal chemotherapy with Cisplatin (100 mg/m2)    Other surgical history  01/2023    total thyriodectomy    Perc placement of ivc filter  08/05/2021    Total abdom hysterectomy  1999    with BSO    Wrist fracture surgery Right 12/2021       Social History:  Social History     Socioeconomic History    Marital status: Single   Tobacco Use    Smoking status: Never    Smokeless tobacco: Never   Vaping Use    Vaping status: Never Used   Substance and Sexual Activity    Alcohol use: Yes     Alcohol/week: 1.0 standard drink of alcohol     Types: 1 Cans of beer per week     Comment: social- few drinks per month    Drug use: Never   Other Topics Concern    Caffeine Concern No    Exercise No     Family History:  Family History   Problem Relation Age of Onset    Cancer Father         lung CA     Allergies:  Allergies   Allergen Reactions    Adhesive Tape RASH     Current Meds:  Current Outpatient Medications on File Prior to Visit   Medication Sig Dispense Refill    LETROZOLE 2.5 MG Oral Tab TAKE 1 TABLET(2.5 MG) BY MOUTH DAILY 90 tablet 0    amLODIPine 5 MG Oral Tab Take 1 tablet (5  mg total) by mouth daily. 90 tablet 0    calcium carbonate 500 MG Oral Chew Tab Chew 1 tablet (500 mg total) by mouth daily as needed for Heartburn.      NON FORMULARY Take 1 capsule by mouth daily. EYE Promise supplement       No current facility-administered medications on file prior to visit.         REVIEW OF SYSTEMS   Constitutional: no fatigue normal energy no weight changes   HENT: normal sinuses and no mouth issues   Eyes: . normal vision no eye pain   Respiratory: normal respirations no cough   Cardiovascular: no CP, or palpitations   Gastrointestinal: normal bowels and no abd pains   Genitourinary:  normal urination no hematuria, no frequency   Musculoskeletal: no pains in arms/legs, normal range of motion   Skin: no rashes or skin lesions that are new   Neurological:  no weakness, no numbness, normal gait   Hematological:  no bruises or bleeding   Psychiatric/Behavioral: normal mood no anxiety normal behavior     /80 (BP Location: Left arm, Patient Position: Sitting, Cuff Size: adult)   Pulse 102   Resp 16   Ht 5' 4\" (1.626 m)   Wt 201 lb 3.2 oz (91.3 kg)   SpO2 95%   BMI 34.54 kg/m²     PHYSICAL EXAM:   Constitutional: Vital signs reviewed as noted, well developed, in no acute distress.   HENT: NCAT  Eyes: pupils reactive bilaterally  Cardiovascular: nl s1 s2 no m/r/g  Pulmonary/Chest: CTA bilaterally with no wheezes  Extremities: no pedal edema   Neurological:  no weakness in UE and LE, reflexes are normal  Skin: wound in the right lower quadrant of the abdomen is still present however it has improved from before but serosanguineous drainage noted  Psychiatric:normal mood

## 2024-06-18 ENCOUNTER — TELEPHONE (OUTPATIENT)
Facility: LOCATION | Age: 74
End: 2024-06-18

## 2024-06-18 NOTE — TELEPHONE ENCOUNTER
Spoke with patient who reports running low on iodoform packing strips.  Per patient she is packing about an inch to an inch and half with each dressing change.  Denies any fever or signs of infection.  Packing is saturated when it is change but is \"mostly blood\"  Advised patient to continue packing wound and can  additional supplies on line or can be picked up at our office.  Dressing placed at reception for .  Verbalized understanding.

## 2024-06-18 NOTE — TELEPHONE ENCOUNTER
Patient advised that Dr. Stark gave her a cloth tape to insert into her wound and she is out of tape. She wants to know if that is ok until she is seen on 06/26.    Please advise

## 2024-06-22 NOTE — TELEPHONE ENCOUNTER
Patient inquiring if she has to keep taking levothyroxine as she has 6 pills left and may need a refill. Pt to ed with c/o redness and swelling to right calf after getting a tattoo 2 weeks ago. + dizziness, denies fevers. PT states he drained it himself yesterday only getting blood from site, today drainage. Reports swollen lymph nodes in right groin

## 2024-06-26 ENCOUNTER — OFFICE VISIT (OUTPATIENT)
Dept: SURGERY | Facility: CLINIC | Age: 74
End: 2024-06-26

## 2024-06-26 VITALS — HEART RATE: 88 BPM | TEMPERATURE: 97 F

## 2024-06-26 DIAGNOSIS — L91.8 HYPERTROPHIC GRANULATION TISSUE: Primary | ICD-10-CM

## 2024-06-26 PROCEDURE — 99212 OFFICE O/P EST SF 10 MIN: CPT | Performed by: SURGERY

## 2024-06-26 NOTE — PROGRESS NOTES
Follow Up Visit Note       Active Problems      1. Hypertrophic granulation tissue          Chief Complaint   Chief Complaint   Patient presents with    [facility]     EP- 2 weeks f/u Hypertrophic granulation tissue , right groin wound check- states the wound is draining           History of Present Illness    Patient presents for continued wound check.  Wound is improving with minimal drainage.  Minimal packing necessary now.  No significant drainage, bleeding, pain or induration.    Allergies  Viola is allergic to adhesive tape.    Past Medical / Surgical / Social / Family History    The past medical and past surgical history have been reviewed by me today.    Past Medical History:    Cancer (HCC)    abdominal tumor/ uterine cancer 1999, abdominal wall 2021    High blood pressure    Hypothyroidism    post surgical    Osteoarthritis    bilateral knees    Personal history of antineoplastic chemotherapy    \"chemo wash\" with surgery    PONV (postoperative nausea and vomiting)    post colonoscopy    Prediabetes    Pulmonary embolism (HCC)    7/2021-getting filter put in 8/5/21    Visual impairment    contacts and glasses    Wears glasses     Past Surgical History:   Procedure Laterality Date    Appendectomy      Appendectomy      Colonoscopy      Finger splint Left     pinky finger    Hysterectomy      total    Other  08/10/2021    Radical resection of right lower abdominal wall and proximal right thigh metastatic carcinoma with sartorius fascia flap transposition and  repair of resultant defect with mesh. Use of SPY to evaluate tissue perfusion.    Other  05/17/2022    Cytoreductive surgery to include resection of left abdominal wall tumor and omentectomy. Hyperthermic intraperitoneal chemotherapy with Cisplatin (100 mg/m2)    Other surgical history  01/2023    total thyriodectomy    Perc placement of ivc filter  08/05/2021    Total abdom hysterectomy  1999    with BSO    Wrist fracture surgery Right 12/2021        The family history and social history have been reviewed by me today.    Family History   Problem Relation Age of Onset    Cancer Father         lung CA     Social History     Socioeconomic History    Marital status: Single   Tobacco Use    Smoking status: Never    Smokeless tobacco: Never   Vaping Use    Vaping status: Never Used   Substance and Sexual Activity    Alcohol use: Yes     Alcohol/week: 1.0 standard drink of alcohol     Types: 1 Cans of beer per week     Comment: social- few drinks per month    Drug use: Never   Other Topics Concern    Caffeine Concern No    Exercise No        Current Outpatient Medications:     SYNTHROID 100 MCG Oral Tab, Take 1 tablet (100 mcg total) by mouth before breakfast., Disp: 90 tablet, Rfl: 0    LETROZOLE 2.5 MG Oral Tab, TAKE 1 TABLET(2.5 MG) BY MOUTH DAILY, Disp: 90 tablet, Rfl: 0    amLODIPine 5 MG Oral Tab, Take 1 tablet (5 mg total) by mouth daily., Disp: 90 tablet, Rfl: 0    calcium carbonate 500 MG Oral Chew Tab, Chew 1 tablet (500 mg total) by mouth daily as needed for Heartburn., Disp: , Rfl:     NON FORMULARY, Take 1 capsule by mouth daily. EYE Promise supplement, Disp: , Rfl:      Review of Systems  The Review of Systems has been reviewed by me during today.  Review of Systems   Constitutional:  Negative for chills, diaphoresis, fatigue, fever and unexpected weight change.   HENT:  Negative for hearing loss, nosebleeds, sore throat and trouble swallowing.    Respiratory:  Negative for apnea, cough, shortness of breath and wheezing.    Cardiovascular:  Negative for chest pain, palpitations and leg swelling.   Gastrointestinal:  Negative for abdominal distention, abdominal pain, anal bleeding, blood in stool, constipation, diarrhea, nausea and vomiting.   Genitourinary:  Negative for difficulty urinating, dysuria, frequency and urgency.   Musculoskeletal:  Negative for arthralgias and myalgias.   Skin:  Negative for color change and rash.   Neurological:   Negative for tremors, syncope and weakness.   Hematological:  Negative for adenopathy. Does not bruise/bleed easily.   Psychiatric/Behavioral:  Negative for behavioral problems and sleep disturbance.         Physical Findings   Pulse 88   Temp 97.4 °F (36.3 °C) (Temporal)   Physical Exam  Constitutional:       Appearance: She is well-developed.   Cardiovascular:      Rate and Rhythm: Normal rate and regular rhythm.      Heart sounds: Normal heart sounds.   Pulmonary:      Effort: Pulmonary effort is normal.      Breath sounds: Normal breath sounds.   Abdominal:      General: Bowel sounds are normal.      Palpations: Abdomen is soft.       Skin:     General: Skin is warm and dry.   Neurological:      Mental Status: She is alert and oriented to person, place, and time.      Deep Tendon Reflexes: Reflexes are normal and symmetric.          Assessment   1. Hypertrophic granulation tissue        Plan     The patient's right groin wound is well-healing.  No further hypertrophic granulation tissue.  No drainable fluid collection.  Local wound care recommendations discussed.  The patient will follow-up in 2 weeks for ongoing wound surveillance.  The patient was provided ample opportunity to ask questions.  All of the patient's questions were answered in detail.  The patient voiced understanding of the care plan.   No orders of the defined types were placed in this encounter.      Imaging & Referrals   None    Follow Up  No follow-ups on file.    Tony Stark MD

## 2024-07-09 ENCOUNTER — OFFICE VISIT (OUTPATIENT)
Facility: LOCATION | Age: 74
End: 2024-07-09
Payer: MEDICARE

## 2024-07-09 VITALS — TEMPERATURE: 97 F | HEART RATE: 90 BPM

## 2024-07-09 DIAGNOSIS — Z09 POSTOP CHECK: Primary | ICD-10-CM

## 2024-07-09 PROCEDURE — 99212 OFFICE O/P EST SF 10 MIN: CPT | Performed by: PHYSICIAN ASSISTANT

## 2024-07-09 NOTE — PROGRESS NOTES
Post Operative Visit Note       Active Problems  1. Postop check         Chief Complaint   Chief Complaint   Patient presents with    Establish Care     EP- 2 weeks f/u Hypertrophic granulation tissue, right groin wound- pt denies drainage LAST 10 DAYS           History of Present Illness   74 year old female presents for follow-up visit.  Patient underwent debridement of open wound to right groin on 5/22/2024 with Dr. Stark.  She has been being followed for hypertrophic granulation tissue to right groin.  She was last seen in office on 6/26 in which wound was noted to be healing well without any further granulation tissue present.  Patient was advised to return in 2 weeks for another wound check.  Patient states she is feeling entirely well today.  She has not needed to cover area with any gauze.  She denies any drainage.  She believes that the wound has healed well.  Denies any obvious redness.  No purulence.  No fevers or chills.          Allergies  Viola is allergic to adhesive tape.    Past Medical / Surgical / Social / Family History    The past medical and past surgical history have been reviewed by me today.     Past Medical History:    Cancer (HCC)    abdominal tumor/ uterine cancer 1999, abdominal wall 2021    High blood pressure    Hypothyroidism    post surgical    Osteoarthritis    bilateral knees    Personal history of antineoplastic chemotherapy    \"chemo wash\" with surgery    PONV (postoperative nausea and vomiting)    post colonoscopy    Prediabetes    Pulmonary embolism (HCC)    7/2021-getting filter put in 8/5/21    Visual impairment    contacts and glasses    Wears glasses     Past Surgical History:   Procedure Laterality Date    Appendectomy      Appendectomy      Colonoscopy      Finger splint Left     pinky finger    Hysterectomy      total    Other  08/10/2021    Radical resection of right lower abdominal wall and proximal right thigh metastatic carcinoma with sartorius fascia flap  transposition and  repair of resultant defect with mesh. Use of SPY to evaluate tissue perfusion.    Other  05/17/2022    Cytoreductive surgery to include resection of left abdominal wall tumor and omentectomy. Hyperthermic intraperitoneal chemotherapy with Cisplatin (100 mg/m2)    Other surgical history  01/2023    total thyriodectomy    Perc placement of ivc filter  08/05/2021    Total abdom hysterectomy  1999    with BSO    Wrist fracture surgery Right 12/2021       The family history and social history have been reviewed by me today.    Family History   Problem Relation Age of Onset    Cancer Father         lung CA     Social History     Socioeconomic History    Marital status: Single   Tobacco Use    Smoking status: Never    Smokeless tobacco: Never   Vaping Use    Vaping status: Never Used   Substance and Sexual Activity    Alcohol use: Yes     Alcohol/week: 1.0 standard drink of alcohol     Types: 1 Cans of beer per week     Comment: social- few drinks per month    Drug use: Never   Other Topics Concern    Caffeine Concern No    Exercise No        Current Outpatient Medications:     SYNTHROID 100 MCG Oral Tab, Take 1 tablet (100 mcg total) by mouth before breakfast., Disp: 90 tablet, Rfl: 0    LETROZOLE 2.5 MG Oral Tab, TAKE 1 TABLET(2.5 MG) BY MOUTH DAILY, Disp: 90 tablet, Rfl: 0    amLODIPine 5 MG Oral Tab, Take 1 tablet (5 mg total) by mouth daily., Disp: 90 tablet, Rfl: 0    calcium carbonate 500 MG Oral Chew Tab, Chew 1 tablet (500 mg total) by mouth daily as needed for Heartburn., Disp: , Rfl:     NON FORMULARY, Take 1 capsule by mouth daily. EYE Promise supplement, Disp: , Rfl:       Review of Systems  A 10 point Review of Systems has been completed by me today and is negative except as above in the HPI.    Physical Findings   Pulse 90   Temp 96.7 °F (35.9 °C) (Temporal)   Gen/psych: alert and oriented, cooperative, no apparent distress  Cardiovascular: regular rate  Respiratory: respirations  unlabored, no wheeze  Abdominal: soft, non-tender, non-distended, no guarding/rebound  Incision: Wound appears well-healed, without any further hyper trophic granulation tissue present.  No surrounding erythema or warmth.  No drainage.         Assessment/Plan  1. Postop check        74 year old female presents for follow-up visit.  Patient underwent debridement of open wound to right groin on 5/22/2024 with Dr. Stark.  She has been being followed for hypertrophic granulation tissue to right groin.  She was last seen in office on 6/26 in which wound was noted to be healing well without any further granulation tissue present.    Patient denies any acute complaints today    The patient continues to do well postoperatively  Wound appears well-healed today without any further hypertrophic granulation tissue present.  No evidence of any infection.  She can continue with local wound care, including soap and water  No further drainage or need to apply overlying gauze  Patient has follow-up with her PCP in 2 weeks  All questions and concerns were answered.  The patient is return to the clinic as needed.         No orders of the defined types were placed in this encounter.       Imaging & Referrals   None    Follow Up  Return if symptoms worsen or fail to improve.    Sadaf Ménedz PA-C  [facility]  7/9/2024  10:22 AM

## 2024-07-18 DIAGNOSIS — I10 PRIMARY HYPERTENSION: ICD-10-CM

## 2024-07-18 NOTE — TELEPHONE ENCOUNTER
Protocol FAIL    Requesting: amLODIPine 5 MG Oral Tab     LOV: 6/12/24  RTC: 6 WEEKS  Filled: 4/17/24 90 TABLET 0 REFILL  Recent Labs: 6/10/24    Upcoming OV   Future Appointments   Date Time Provider Department Center   7/31/2024 11:30 AM Zoe Muro DO [facility] [place]   10/7/2024 11:20 AM Solomon Contreras MD [facility] [address]

## 2024-07-19 RX ORDER — AMLODIPINE BESYLATE 5 MG/1
5 TABLET ORAL DAILY
Qty: 90 TABLET | Refills: 0 | Status: SHIPPED | OUTPATIENT
Start: 2024-07-19 | End: 2025-07-14

## 2024-07-29 ENCOUNTER — LAB ENCOUNTER (OUTPATIENT)
Dept: LAB | Age: 74
End: 2024-07-29
Attending: INTERNAL MEDICINE
Payer: MEDICARE

## 2024-07-29 DIAGNOSIS — E89.0 POSTOPERATIVE HYPOTHYROIDISM: ICD-10-CM

## 2024-07-29 DIAGNOSIS — I10 PRIMARY HYPERTENSION: ICD-10-CM

## 2024-07-29 DIAGNOSIS — E78.2 MIXED HYPERLIPIDEMIA: ICD-10-CM

## 2024-07-29 LAB
CHOLEST SERPL-MCNC: 183 MG/DL (ref ?–200)
FASTING PATIENT LIPID ANSWER: YES
HDLC SERPL-MCNC: 45 MG/DL (ref 40–59)
LDLC SERPL CALC-MCNC: 108 MG/DL (ref ?–100)
NONHDLC SERPL-MCNC: 138 MG/DL (ref ?–130)
T4 FREE SERPL-MCNC: 1.4 NG/DL (ref 0.8–1.7)
TRIGL SERPL-MCNC: 169 MG/DL (ref 30–149)
TSI SER-ACNC: 9.08 MIU/ML (ref 0.55–4.78)
VLDLC SERPL CALC-MCNC: 29 MG/DL (ref 0–30)

## 2024-07-29 PROCEDURE — 80061 LIPID PANEL: CPT

## 2024-07-29 PROCEDURE — 84443 ASSAY THYROID STIM HORMONE: CPT

## 2024-07-29 PROCEDURE — 84439 ASSAY OF FREE THYROXINE: CPT

## 2024-07-29 PROCEDURE — 36415 COLL VENOUS BLD VENIPUNCTURE: CPT

## 2024-07-31 ENCOUNTER — OFFICE VISIT (OUTPATIENT)
Dept: INTERNAL MEDICINE CLINIC | Facility: CLINIC | Age: 74
End: 2024-07-31
Payer: MEDICARE

## 2024-07-31 VITALS
HEIGHT: 64 IN | WEIGHT: 193.38 LBS | OXYGEN SATURATION: 98 % | HEART RATE: 90 BPM | TEMPERATURE: 98 F | BODY MASS INDEX: 33.02 KG/M2 | DIASTOLIC BLOOD PRESSURE: 82 MMHG | RESPIRATION RATE: 16 BRPM | SYSTOLIC BLOOD PRESSURE: 138 MMHG

## 2024-07-31 DIAGNOSIS — R73.03 PREDIABETES: ICD-10-CM

## 2024-07-31 DIAGNOSIS — E89.0 POSTOPERATIVE HYPOTHYROIDISM: Primary | ICD-10-CM

## 2024-07-31 DIAGNOSIS — T81.30XA WOUND DEHISCENCE: ICD-10-CM

## 2024-07-31 DIAGNOSIS — E66.01 CLASS 2 SEVERE OBESITY WITH SERIOUS COMORBIDITY AND BODY MASS INDEX (BMI) OF 35.0 TO 35.9 IN ADULT, UNSPECIFIED OBESITY TYPE (HCC): ICD-10-CM

## 2024-07-31 DIAGNOSIS — I10 PRIMARY HYPERTENSION: ICD-10-CM

## 2024-07-31 LAB
CARTRIDGE EXPIRATION DATE: 2025 DATE
CARTRIDGE LOT#: 7155 NUMERIC
HEMOGLOBIN A1C: 5.7 % (ref 4.3–5.6)

## 2024-07-31 PROCEDURE — 99214 OFFICE O/P EST MOD 30 MIN: CPT | Performed by: INTERNAL MEDICINE

## 2024-07-31 PROCEDURE — 83036 HEMOGLOBIN GLYCOSYLATED A1C: CPT | Performed by: INTERNAL MEDICINE

## 2024-07-31 PROCEDURE — G2211 COMPLEX E/M VISIT ADD ON: HCPCS | Performed by: INTERNAL MEDICINE

## 2024-07-31 RX ORDER — LEVOTHYROXINE SODIUM 0.1 MG/1
100 TABLET ORAL
COMMUNITY

## 2024-07-31 RX ORDER — SULFAMETHOXAZOLE AND TRIMETHOPRIM 800; 160 MG/1; MG/1
1 TABLET ORAL 2 TIMES DAILY
Qty: 14 TABLET | Refills: 0 | Status: SHIPPED | OUTPATIENT
Start: 2024-07-31 | End: 2024-08-07

## 2024-07-31 NOTE — PATIENT INSTRUCTIONS
Great job on the weight loss    Lets continue your thyroid medicine at the current dose and repeat your levels again in 3 months    I have referred you to the wound clinic and I have started you on antibiotics.  Take it twice a day for 7 days and it may cause some diarrhea.    Your cholesterol and prediabetes have really improved great job    I will see you in person in 6 months but sooner if you need anything else

## 2024-07-31 NOTE — PROGRESS NOTES
Patient Office Visit    ASSESSMENT AND PLAN:   1. Postoperative hypothyroidism  Note: TSH levels have improved since patient has been on levothyroxine 100.  We will continue the current dose and repeat labs in 3 months.  He could not afford the brand-name medicine  - TSH W Reflex To Free T4 [E]; Future    2. Primary hypertension  Note: Since her thyroid has improved her blood pressure has improved as well.  Will continue with amlodipine    3. Prediabetes  Note: Improved after weight loss.  Continue with dietary modifications  - HEMOGLOBIN A1C    4. Class 2 severe obesity with serious comorbidity and body mass index (BMI) of 35.0 to 35.9 in adult, unspecified obesity type (HCC)  Note: Applauded on the weight loss.  Continue with diet and lifestyle modifications    5. Wound dehiscence  Note: Given discharge will start patient on antibiotics and refer her to the wound clinic  - OP REFERRAL - WOUND CLINIC  - sulfamethoxazole-trimethoprim -160 MG Oral Tab per tablet; Take 1 tablet by mouth 2 (two) times daily for 7 days.  Dispense: 14 tablet; Refill: 0    Return to clinic in 6 months for a routine follow-up or sooner if needed.  Continues to decline vaccines    Patient/Caregiver Education: Patient/Caregiver Education: There are no barriers to learning. Medical education done. Outcome: Patient verbalizes understanding. Patient is notified to call with any questions, complications, allergies, or worsening or changing symptoms.  Patient is to call with any side effects or complications from the treatments as a result of today.      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)    History of pulmonary embolism    Internal hemorrhoids    Peritoneal carcinomatosis (HCC)    Osteopenia    Encounter for preoperative screening laboratory testing for COVID-19 virus    H/O total thyroidectomy     Postoperative hypothyroidism    Iron deficiency anemia    Primary hypertension    Status post total replacement of left hip    History of DVT (deep vein thrombosis)    Class 2 severe obesity with serious comorbidity and body mass index (BMI) of 35.0 to 35.9 in adult (HCC)    Hypertrophic granulation tissue    Wound disruption       Orders Placed This Encounter   Procedures    HEMOGLOBIN A1C     Order Specific Question:   Release to patient     Answer:   Immediate    TSH W Reflex To Free T4 [E]     Standing Status:   Future     Standing Expiration Date:   7/31/2025     Order Specific Question:   Release to patient     Answer:   Immediate     Requested Prescriptions     Signed Prescriptions Disp Refills    sulfamethoxazole-trimethoprim -160 MG Oral Tab per tablet 14 tablet 0     Sig: Take 1 tablet by mouth 2 (two) times daily for 7 days.         Zoe Muro DO  CC:  Chief Complaint   Patient presents with    Follow - Up         HPI:   Viola Lindsay is a 74-year-old female who presents for a follow-up    Hypertension/hypothyroid/anemia/prediabetes: Everything has improved.  She has made dietary changes and is taking her medications regularly  Unhealed wound: She has seen the surgeon a few times however has not been able to get the wound treated.  She is not having any discharge initially but now she is having some discharge.  She is not sure what else she can do about this.    Past Medical History:    Cancer (HCC)    abdominal tumor/ uterine cancer 1999, abdominal wall 2021    High blood pressure    Hypothyroidism    post surgical    Osteoarthritis    bilateral knees    Personal history of antineoplastic chemotherapy    \"chemo wash\" with surgery    PONV (postoperative nausea and vomiting)    post colonoscopy    Prediabetes    Pulmonary embolism (HCC)    7/2021-getting filter put in 8/5/21    Visual impairment    contacts and glasses    Wears glasses       Past Surgical History:   Procedure Laterality  Date    Appendectomy      Appendectomy      Colonoscopy      Finger splint Left     pinky finger    Hysterectomy      total    Other  08/10/2021    Radical resection of right lower abdominal wall and proximal right thigh metastatic carcinoma with sartorius fascia flap transposition and  repair of resultant defect with mesh. Use of SPY to evaluate tissue perfusion.    Other  05/17/2022    Cytoreductive surgery to include resection of left abdominal wall tumor and omentectomy. Hyperthermic intraperitoneal chemotherapy with Cisplatin (100 mg/m2)    Other surgical history  01/2023    total thyriodectomy    Perc placement of ivc filter  08/05/2021    Total abdom hysterectomy  1999    with BSO    Wrist fracture surgery Right 12/2021       Social History:  Social History     Socioeconomic History    Marital status: Single   Tobacco Use    Smoking status: Never    Smokeless tobacco: Never   Vaping Use    Vaping status: Never Used   Substance and Sexual Activity    Alcohol use: Yes     Alcohol/week: 1.0 standard drink of alcohol     Types: 1 Cans of beer per week     Comment: social- few drinks per month    Drug use: Never   Other Topics Concern    Caffeine Concern No    Exercise No     Family History:  Family History   Problem Relation Age of Onset    Cancer Father         lung CA     Allergies:  Allergies   Allergen Reactions    Adhesive Tape RASH     Current Meds:  Current Outpatient Medications on File Prior to Visit   Medication Sig Dispense Refill    levothyroxine 100 MCG Oral Tab Take 1 tablet (100 mcg total) by mouth before breakfast.      amLODIPine 5 MG Oral Tab Take 1 tablet (5 mg total) by mouth daily. 90 tablet 0    LETROZOLE 2.5 MG Oral Tab TAKE 1 TABLET(2.5 MG) BY MOUTH DAILY 90 tablet 0    calcium carbonate 500 MG Oral Chew Tab Chew 1 tablet (500 mg total) by mouth daily as needed for Heartburn.      NON FORMULARY Take 1 capsule by mouth daily. EYE Promise supplement       No current facility-administered  medications on file prior to visit.         REVIEW OF SYSTEMS   Constitutional: no fatigue normal energy no weight changes   HENT: normal sinuses and no mouth issues   Eyes: . normal vision no eye pain   Respiratory: normal respirations no cough   Cardiovascular: no CP, or palpitations   Gastrointestinal: normal bowels and no abd pains   Genitourinary:  normal urination no hematuria, no frequency   Musculoskeletal: no pains in arms/legs, normal range of motion   Skin: no rashes or skin lesions that are new   Neurological:  no weakness, no numbness, normal gait   Hematological:  no bruises or bleeding   Psychiatric/Behavioral: normal mood no anxiety normal behavior     /82 (BP Location: Left arm, Patient Position: Sitting, Cuff Size: adult)   Pulse 90   Temp 97.7 °F (36.5 °C) (Temporal)   Resp 16   Ht 5' 4\" (1.626 m)   Wt 193 lb 6.4 oz (87.7 kg)   SpO2 98%   BMI 33.20 kg/m²     PHYSICAL EXAM:   Constitutional: Vital signs reviewed as noted, well developed, in no acute distress.   HENT: NCAT, bilateral ear canal and tympanic membrane appear normal  Eyes: pupils reactive bilaterally  Cardiovascular: nl s1 s2 no m/r/g  Pulmonary/Chest: CTA bilaterally with no wheezes  Extremities: no pedal edema   Neurological:  no weakness in UE and LE, reflexes are normal  Skin: Open wound noted in the right groin with slight discharge noted  Psychiatric:normal mood

## 2024-08-04 NOTE — PROGRESS NOTES
She is postop total thyroidectomy doing well. She has no pain or significant dysphagia. She has had no numbness tingling or cramping. She is taking calcium 3 times a day. She is also on thyroid hormone. The incision is healing well. The sutures were removed today without difficulty. There is no signs of infection. Final pathology report is pending. Stable postop thyroidectomy for large multinodular thyroid gland. She will wean down on calcium. She will continue on thyroid hormone.   She will follow-up with Dr. Marquita Smith.
foreign bodies found.  The wound was repaired with One layer suture closure: Skin Layer:  2 sutures placed, stitch type:simple interrupted, suture: 4-0 nylon.  The wound was closed with good hemostasis and approximation.  Sterile dressing applied.  Estimated blood loss: minimal  The procedure took 1-15 minutes, and pt tolerated well.       CRITICAL CARE TIME       SCREENINGS   NIH Stroke Score     Heart Score     Curb-65        EMERGENCY DEPARTMENT COURSE, COUNSELING and DIFFERENTIAL DIAGNOSIS/MDM   Vitals:    Vitals:    08/03/24 2210 08/03/24 2345   BP: (!) 158/97    Pulse: 66 66   Resp: 18    Temp: 98.4 °F (36.9 °C)    TempSrc: Oral    SpO2: 100%             Patient was given the following medications:  Medications   lidocaine-EPINEPHrine-tetracaine (LET) topical gel 3 mL syringe (3 mLs Topical Given 8/3/24 2321)   Tetanus-Diphth-Acell Pertussis (BOOSTRIX) injection 0.5 mL (0.5 mLs IntraMUSCular Given 8/4/24 0011)       CONSULTS: (Who and What was discussed)  None      Chronic Conditions: hx TBI    Social Determinants affecting Dx or Tx: None    Counseling:   Records Reviewed (source and summary of external notes): Nursing Notes, Previous Radiology Studies, and Previous Laboratory Studies    CC/HPI Summary, DDx, ED Course, and Reassessment: Patient presents with laceration.DDx: simple laceration vs complex laceration (open fracture, foreign body retention).  Occurred on a can while throwing trash out, will update tetanus. No broken glass/broken can, no foreign bodies visualized in wound. Irrigated copiously. Hemostasis was obtained with a finger tourniquet, patient was extremely nervous about any digital block or having injection of anesthetic.  Topical lidocaine administered and patient tolerated placement of 2 sutures. The wound was hemostatic. Small amount of dermabond administered to distal aspect of wound as patient unable to tolerate further sutures.     The wound has been explored well without foreign

## 2024-08-21 RX ORDER — LEVOTHYROXINE SODIUM 100 UG/1
100 TABLET ORAL DAILY
Qty: 90 TABLET | Refills: 0 | Status: SHIPPED | OUTPATIENT
Start: 2024-08-21 | End: 2025-08-16

## 2024-08-21 NOTE — PROGRESS NOTES
CHIEF COMPLAINT:     Chief Complaint   Patient presents with    Wound Care     Patient arrives for initial wound consult visit. Had abdominal surgery 3 yeas ago. Had a cancer washout with Dr Strickland then 2 years ago. Has had multiple drains in the right groin/lower abdomen. Reports that incision was infected twice. Noticed in march that a wound had formed on lower abdomen and was draining. Saw Dr Stark who said this wound was from the aspiration of abscess. Was on antibiotics about a month ago     HPI:   Information obtained from Patient and chart  8-22-24 INITIAL:  Patient is a 73 yo female with pmhx of htn, prediabetes (7-31-24 a1c 5.7), obesity, wound dehiscence, hl, peritoneal carcinomatosis. Patient has an open wound to her right groin/lower abdomen from a previous drain site from her HIPEC (hyperthermic intraperitoneal chemotherapy) surgery.  Early 2024 patient noted a lump in her abdomen, she saw her oncologist dr maximiliano horowitz and he noted right groin abscess which is draining.   Her pcp recommended she see dr stark and she has been seeing him for frequent debridements of hypergranulation tissue in the office.  She did have ct scan march 2024 and it noted that the masslike region in right inguinal location is stable.  On 7-9-24 it was noted that the wound was fully resolved.  However patient saw primary and it was noted the wound was draining again. She was started on po Bactrim and referred to wound clinic. She has been packing with packing strip.  She states her next Ct scan is set up for September.  Today on examination and exploration of the wound, the tissue is very friable, smooth and non granular.  On exploration with forceps the forceps caught on tissue, this was removed. There was one suture and 3 pieces of tissue that appeared to be a foreign substance (mesh?adaptic dressing?).  The tissue was sent to lab for cultures (afb, fungal, aerobic and anaerobic) as well as pathology for  identification. Patient will utilize topical gent on packing strip until we receive sensitivities.  I also asked the patient to wear supportive undergarments.    MEDICATIONS:     Current Outpatient Medications:     gentamicin 0.1 % External Ointment, Apply 1 Application topically 3 (three) times daily., Disp: 45 g, Rfl: 0    levothyroxine 100 MCG Oral Tab, Take 1 tablet (100 mcg total) by mouth before breakfast., Disp: , Rfl:     amLODIPine 5 MG Oral Tab, Take 1 tablet (5 mg total) by mouth daily., Disp: 90 tablet, Rfl: 0    LETROZOLE 2.5 MG Oral Tab, TAKE 1 TABLET(2.5 MG) BY MOUTH DAILY, Disp: 90 tablet, Rfl: 0    NON FORMULARY, Take 1 capsule by mouth daily. [employer] supplement, Disp: , Rfl:     levothyroxine 100 MCG Oral Tab, Take 1 tablet (100 mcg total) by mouth daily., Disp: 90 tablet, Rfl: 0    calcium carbonate 500 MG Oral Chew Tab, Chew 1 tablet (500 mg total) by mouth daily as needed for Heartburn. (Patient not taking: Reported on 8/22/2024), Disp: , Rfl:   ALLERGIES:     Allergies   Allergen Reactions    Adhesive Tape RASH      REVIEW OF SYSTEMS:   This information was obtained from the patient/family and chart.    See HPI for pertinent positives, otherwise 10 pt ROS negative.    HISTORY:     Past Medical History:    Cancer (HCC)    abdominal tumor/ uterine cancer 1999, abdominal wall 2021    High blood pressure    Hypothyroidism    post surgical    Osteoarthritis    bilateral knees    Personal history of antineoplastic chemotherapy    \"chemo wash\" with surgery    PONV (postoperative nausea and vomiting)    post colonoscopy    Prediabetes    Pulmonary embolism (HCC)    7/2021-getting filter put in 8/5/21    Visual impairment    contacts and glasses    Wears glasses     Past Surgical History:   Procedure Laterality Date    Appendectomy      Appendectomy      Colonoscopy      Finger splint Left     pinky finger    Hysterectomy      total    Other  08/10/2021    Radical resection of right lower abdominal  wall and proximal right thigh metastatic carcinoma with sartorius fascia flap transposition and  repair of resultant defect with mesh. Use of SPY to evaluate tissue perfusion.    Other  05/17/2022    Cytoreductive surgery to include resection of left abdominal wall tumor and omentectomy. Hyperthermic intraperitoneal chemotherapy with Cisplatin (100 mg/m2)    Other surgical history  01/2023    total thyriodectomy    Perc placement of ivc filter  08/05/2021    Total abdom hysterectomy  1999    with BSO    Wrist fracture surgery Right 12/2021      Social History     Socioeconomic History    Marital status: Single   Tobacco Use    Smoking status: Never    Smokeless tobacco: Never   Vaping Use    Vaping status: Never Used   Substance and Sexual Activity    Alcohol use: Yes     Alcohol/week: 1.0 standard drink of alcohol     Types: 1 Cans of beer per week     Comment: social- few drinks per month    Drug use: Never   Other Topics Concern    Caffeine Concern No    Exercise No     PHYSICAL EXAM:     Vitals:    08/22/24 1030   BP: 143/73   Pulse: 106   Resp: 16   Temp: 97.7 °F (36.5 °C)   Weight: 196 lb (88.9 kg)   Height: 64\"      Estimated body mass index is 33.64 kg/m² as calculated from the following:    Height as of this encounter: 64\".    Weight as of this encounter: 196 lb (88.9 kg).   No results found for: \"PGLU\"    Vital signs reviewed.Appears stated age, well groomed.    Constitutional:  Bp wnl for patient. Pulse Regular and wnl for patient. Respirations easy and unlabored. Temperature wnl. Obese.  Appearance neat and clean. Appears in no acute distress. Well nourished and well developed.  Cardiovascular:  Heart rhythm and rate regular, without murmur or gallop.     Musculoskeletal:  Gait and station stable   Integumentary:  refer to wound characteristics and images   Psychiatric:  Judgment and insight intact. Alert and oriented times 3. No evidence of depression, anxiety, or agitation. Calm, cooperative, and  communicative. Appropriate interactions and affect.    DIAGNOSTICS:     Lab Results   Component Value Date    BUN 16 03/21/2024    CREATSERUM 0.85 03/21/2024    ALB 4.0 03/21/2024    TP 7.8 03/21/2024    A1C 5.7 (A) 07/31/2024   3-14-24 CT abdomen pelvis  ABDOMINAL WALL:  Right ventral inguinal soft tissue thickening to surgical clip is again noted.  This measures approximately 5.3 x 1.9 cm (image 100) is stable.  This extends into additional right inguinal region that measures 4.5 x 2.3 cm that is stable (image 25).  Caudal to this spiculated region of soft tissue density measuring 2.8 x 1.8 cm (image 109) is stable.   CONCLUSION:     1. Soft tissue thickening and masslike region in the right inguinal location is stable.  Multiple surgical skin clips are noted in this region.    2. Minimal infiltration the fat and calcification adjacent sigmoid colon is nonspecific.     WOUND ASSESSMENT:     Wound 08/24/23 Incision Hip Left;Posterior (Active)   Date First Assessed/Time First Assessed: 08/24/23 1129   Present on Original Admission: No  Primary Wound Type: Incision  Location: Hip  Wound Location Orientation: Left;Posterior      Assessments 8/24/2023 12:12 PM 8/25/2023  9:00 AM   Site Assessment Clean;Dry Unable to assess   Closure Sutures;Steri-strips Unable to assess   Drainage Amount None None   Treatments Abduction pillow;Compression Sleeve;Ice Therapy Wrap Abduction pillow;Compression Sleeve;Ice Therapy Wrap   Dressing Aquacel;Dermabond Aquacel   Dressing Changed New --   Dressing Status Clean;Dry;Intact Clean;Dry;Intact       No associated orders.       Wound 08/22/24 #1 Groin Anterior;Right;Upper (Active)   Date First Assessed/Time First Assessed: 08/22/24 1022    Wound Number (Wound Clinic Only): #1  Primary Wound Type: Old surgical  Location: Groin  Wound Location Orientation: Anterior;Right;Upper      Assessments 8/22/2024 10:22 AM   Wound Image      Drainage Amount Small   Drainage Description  Serosanguineous   Wound Length (cm) 0.5 cm   Wound Width (cm) 0.9 cm   Wound Surface Area (cm^2) 0.45 cm^2   Wound Depth (cm) 1.5 cm   Wound Volume (cm^3) 0.675 cm^3   Margins Well-defined edges   Non-staged Wound Description Full thickness   Kate-wound Assessment Induration   Wound Granulation Tissue Red;Pink;Spongy   Wound Bed Granulation (%) 100 %   Wound Odor None   Tunneling? Yes   Number of Tunnels 1   Tunnel 1 Location 3   Tunnel 1 Depth 3.2   Undermining? No   Sinus Tracts? No       No associated orders.          ASSESSMENT AND PLAN:    1. 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 Peritoneal carcinomatosis (HCC)  - CBC W Differential W Platelet; Future  - Comp Metabolic Panel (14); Future  - Tissue culture; Future  - Specimen to Pathology, Tissue; Future    3. Non-pressure chronic ulcer of skin of other sites with fat layer exposed (HCC)          Discussed with patient the aspects of wound healing including:   wound bed optimization including moist wound healing, removal of necrosis, bioburden control, monitoring for infection, supporting her pannus and finally the patient as a whole including nutrition and increased protein intake and blood glucose control.    Risks, benefits, and alternatives of current treatment plan discussed in detail.  Questions and concerns addressed. Red flags to RTC or ED reviewed.  Patient (or parent) agrees to plan.      NOTE TO PATIENT: The 21st Century Cures Act makes clinical notes like these available to patients in the interest of transparency. Clinical notes are medical documents used by physicians and care providers to communicate with each other. These documents include medical language and terminology, abbreviations, and treatment information that may sound technical and at times possibly unfamiliar. In  addition, at times, the verbiage may appear blunt or direct. These documents are one tool providers use to communicate relevant information and clinical opinions of the care providers in a way that allows common understanding of the clinical context.   Patient is new to clinic, has seen [facility] providers previously.   spent   45   minutes with the patient. This time included:    preparing to see the patient (eg, review notes and recent diagnostics),  seeing the patient, obtaining and/or reviewing separately obtained history, performing a medically appropriate examination and/or evaluation, counseling and educating the patient, documenting in the record, labs, cx, path  DISCHARGE:      Patient Instructions   Please return: 1 week    Things to do:  Laboratory (blood draw) orders:  Orders Placed This Encounter   Procedures    CBC W Differential W Platelet    Comp Metabolic Panel (14)    Specimen to Pathology, Tissue    Tissue culture       Meds & Refills for this Visit:  Requested Prescriptions     Signed Prescriptions Disp Refills    gentamicin 0.1 % External Ointment 45 g 0     Sig: Apply 1 Application topically 3 (three) times daily.     Patient discharge and wound care instructions  Viola ALVARADO Bieniek  8/22/2024     You may shower and cleanse area with mild soap and water, rinse wound with ANASEPT or other hypochlorus cleanser, dab dry with gauze and apply your dressings as directed.      APPLY gentamicin to the packing strip and place into the wound (the wound goes toward the center) until you meet resistance. Leave a tail and cover.    Wear compression shorts to support your belly.    Nutrition and blood sugar control:  Focus on the following:  Protein: Meats, beans, eggs, milk and yogurt particularly Greek yogurt), tofu, soy nuts, soy protein products (Follow the protein handout in your welcome folder)  Vitamin C: Citrus fruits and juices, strawberries, tomatoes, tomato juice, peppers, baked potatoes, spinach,  broccoli, cauliflower, [place] sprouts, cabbage  Vitamin A: Dark green, leafy vegetables, orange or yellow vegetables, cantaloupe, fortified dairy products, liver, fortified cereals  Zinc: Fortified cereals, red meats, seafood  Consider supplementing with Mark by [employer]. It can be purchased on amazon, [employer] website, or local pharmacy may be able to order it for you.  (These are essential branch chain amino acids that help with tissue building and wound healing).   When your blood sugar is consistently elevated greater than 180 your body can't heal or fight infection.     Concerns:  Signs of infection may include the following:  Increase in redness  Red \"streaks\" from wound  Increase in swelling  Fever  Unusual odor  Change in the amount of wound drainage     Should you experience any significant changes in your wound(s) or have any questions regarding your home care instructions please contact the wound center [facility] @ [phone] If after regular business hours, please call your family doctor or local emergency room. The treatment plan has been discussed at length between you and your provider. Follow all instructions carefully, it is very important. If you do not follow all instructions you are at risk of your wound not healing, infection, possible loss of limb and even loss of life.    Ramandeep Brunner FNP-C, CWCN-AP, CFCN, CSWS, WCC, DWC  8/22/2024

## 2024-08-21 NOTE — TELEPHONE ENCOUNTER
On generic Thyroid medication went off for 6weeks then went back on it again. Patient is asking for a refill of the generic thyroid medication not the name brand.

## 2024-08-21 NOTE — TELEPHONE ENCOUNTER
Patient called in, she needs medication clarification.       Discuss discontinued medication.     # [phone]

## 2024-08-22 ENCOUNTER — OFFICE VISIT (OUTPATIENT)
Dept: WOUND CARE | Facility: HOSPITAL | Age: 74
End: 2024-08-22
Attending: NURSE PRACTITIONER
Payer: MEDICARE

## 2024-08-22 ENCOUNTER — LAB ENCOUNTER (OUTPATIENT)
Dept: LAB | Age: 74
End: 2024-08-22
Attending: NURSE PRACTITIONER
Payer: MEDICARE

## 2024-08-22 VITALS
HEART RATE: 106 BPM | DIASTOLIC BLOOD PRESSURE: 73 MMHG | WEIGHT: 196 LBS | RESPIRATION RATE: 16 BRPM | BODY MASS INDEX: 33.46 KG/M2 | SYSTOLIC BLOOD PRESSURE: 143 MMHG | HEIGHT: 64 IN | TEMPERATURE: 98 F

## 2024-08-22 DIAGNOSIS — C79.89 SECONDARY MALIGNANT NEOPLASM OF SOFT TISSUES OF ABDOMEN (HCC): Primary | ICD-10-CM

## 2024-08-22 DIAGNOSIS — C78.6 PERITONEAL CARCINOMATOSIS (HCC): ICD-10-CM

## 2024-08-22 DIAGNOSIS — C79.89 SECONDARY MALIGNANT NEOPLASM OF SOFT TISSUES OF ABDOMEN (HCC): ICD-10-CM

## 2024-08-22 DIAGNOSIS — L98.492 NON-PRESSURE CHRONIC ULCER OF SKIN OF OTHER SITES WITH FAT LAYER EXPOSED (HCC): ICD-10-CM

## 2024-08-22 LAB
ALBUMIN SERPL-MCNC: 4.7 G/DL (ref 3.2–4.8)
ALBUMIN/GLOB SERPL: 1.9 {RATIO} (ref 1–2)
ALP LIVER SERPL-CCNC: 64 U/L
ALT SERPL-CCNC: 14 U/L
ANION GAP SERPL CALC-SCNC: 6 MMOL/L (ref 0–18)
AST SERPL-CCNC: 17 U/L (ref ?–34)
BASOPHILS # BLD AUTO: 0.08 X10(3) UL (ref 0–0.2)
BASOPHILS NFR BLD AUTO: 0.7 %
BILIRUB SERPL-MCNC: 0.3 MG/DL (ref 0.2–1.1)
BUN BLD-MCNC: 14 MG/DL (ref 9–23)
CALCIUM BLD-MCNC: 9.6 MG/DL (ref 8.7–10.4)
CHLORIDE SERPL-SCNC: 106 MMOL/L (ref 98–112)
CO2 SERPL-SCNC: 28 MMOL/L (ref 21–32)
CREAT BLD-MCNC: 0.87 MG/DL
EGFRCR SERPLBLD CKD-EPI 2021: 70 ML/MIN/1.73M2 (ref 60–?)
EOSINOPHIL # BLD AUTO: 0.19 X10(3) UL (ref 0–0.7)
EOSINOPHIL NFR BLD AUTO: 1.8 %
ERYTHROCYTE [DISTWIDTH] IN BLOOD BY AUTOMATED COUNT: 12.6 %
FASTING STATUS PATIENT QL REPORTED: NO
GLOBULIN PLAS-MCNC: 2.5 G/DL (ref 2–3.5)
GLUCOSE BLD-MCNC: 146 MG/DL (ref 70–99)
HCT VFR BLD AUTO: 37.9 %
HGB BLD-MCNC: 12.3 G/DL
IMM GRANULOCYTES # BLD AUTO: 0.05 X10(3) UL (ref 0–1)
IMM GRANULOCYTES NFR BLD: 0.5 %
LYMPHOCYTES # BLD AUTO: 2.32 X10(3) UL (ref 1–4)
LYMPHOCYTES NFR BLD AUTO: 21.7 %
MCH RBC QN AUTO: 28.5 PG (ref 26–34)
MCHC RBC AUTO-ENTMCNC: 32.5 G/DL (ref 31–37)
MCV RBC AUTO: 87.9 FL
MONOCYTES # BLD AUTO: 0.72 X10(3) UL (ref 0.1–1)
MONOCYTES NFR BLD AUTO: 6.7 %
NEUTROPHILS # BLD AUTO: 7.34 X10 (3) UL (ref 1.5–7.7)
NEUTROPHILS # BLD AUTO: 7.34 X10(3) UL (ref 1.5–7.7)
NEUTROPHILS NFR BLD AUTO: 68.6 %
OSMOLALITY SERPL CALC.SUM OF ELEC: 293 MOSM/KG (ref 275–295)
PLATELET # BLD AUTO: 241 10(3)UL (ref 150–450)
POTASSIUM SERPL-SCNC: 4.1 MMOL/L (ref 3.5–5.1)
PROT SERPL-MCNC: 7.2 G/DL (ref 5.7–8.2)
RBC # BLD AUTO: 4.31 X10(6)UL
SODIUM SERPL-SCNC: 140 MMOL/L (ref 136–145)
WBC # BLD AUTO: 10.7 X10(3) UL (ref 4–11)

## 2024-08-22 PROCEDURE — 80053 COMPREHEN METABOLIC PANEL: CPT

## 2024-08-22 PROCEDURE — 36415 COLL VENOUS BLD VENIPUNCTURE: CPT

## 2024-08-22 PROCEDURE — 85025 COMPLETE CBC W/AUTO DIFF WBC: CPT

## 2024-08-22 PROCEDURE — 99215 OFFICE O/P EST HI 40 MIN: CPT | Performed by: NURSE PRACTITIONER

## 2024-08-22 RX ORDER — GENTAMICIN SULFATE 1 MG/G
1 OINTMENT TOPICAL 3 TIMES DAILY
Qty: 45 G | Refills: 0 | Status: SHIPPED | OUTPATIENT
Start: 2024-08-22

## 2024-08-22 NOTE — PROGRESS NOTES
.Weekly Wound Education Note    Teaching Provided To: Patient  Training Topics: Dressing;Cleasing and general instructions;Discharge instructions  Training Method: Explain/Verbal;Written  Training Response: Patient responds and understands        Notes: Inital visit for groin wound. Tissue culture collexted this visit. Start gentamicin, plain packing strips, and bordered gauze. Pt encouraged to wear compressive shorts for support.

## 2024-08-22 NOTE — PATIENT INSTRUCTIONS
Please return: 1 week    Things to do:  Laboratory (blood draw) orders:  Orders Placed This Encounter   Procedures    CBC W Differential W Platelet    Comp Metabolic Panel (14)    Specimen to Pathology, Tissue    Tissue culture       Meds & Refills for this Visit:  Requested Prescriptions     Signed Prescriptions Disp Refills    gentamicin 0.1 % External Ointment 45 g 0     Sig: Apply 1 Application topically 3 (three) times daily.     Patient discharge and wound care instructions  Viola Lindsay  8/22/2024     You may shower and cleanse area with mild soap and water, rinse wound with ANASEPT or other hypochlorus cleanser, dab dry with gauze and apply your dressings as directed.      APPLY gentamicin to the packing strip and place into the wound (the wound goes toward the center) until you meet resistance. Leave a tail and cover.    Wear compression shorts to support your belly.    Nutrition and blood sugar control:  Focus on the following:  Protein: Meats, beans, eggs, milk and yogurt particularly Greek yogurt), tofu, soy nuts, soy protein products (Follow the protein handout in your welcome folder)  Vitamin C: Citrus fruits and juices, strawberries, tomatoes, tomato juice, peppers, baked potatoes, spinach, broccoli, cauliflower, [place] sprouts, cabbage  Vitamin A: Dark green, leafy vegetables, orange or yellow vegetables, cantaloupe, fortified dairy products, liver, fortified cereals  Zinc: Fortified cereals, red meats, seafood  Consider supplementing with Mark by [employer]. It can be purchased on amazon, [employer] website, or local pharmacy may be able to order it for you.  (These are essential branch chain amino acids that help with tissue building and wound healing).   When your blood sugar is consistently elevated greater than 180 your body can't heal or fight infection.     Concerns:  Signs of infection may include the following:  Increase in redness  Red \"streaks\" from wound  Increase in swelling  Fever   Unusual odor  Change in the amount of wound drainage     Should you experience any significant changes in your wound(s) or have any questions regarding your home care instructions please contact the wound center [facility] @ [phone] If after regular business hours, please call your family doctor or local emergency room. The treatment plan has been discussed at length between you and your provider. Follow all instructions carefully, it is very important. If you do not follow all instructions you are at risk of your wound not healing, infection, possible loss of limb and even loss of life.

## 2024-08-23 NOTE — PROGRESS NOTES
Spoke to patient - went over lab results per provider everything looks great. Once the provider sees results for the culture/tissue pathology we will give her call. She verbalized understanding. All questions answered at this time.

## 2024-08-25 RX ORDER — SULFAMETHOXAZOLE/TRIMETHOPRIM 800-160 MG
1 TABLET ORAL 2 TIMES DAILY
Qty: 20 TABLET | Refills: 0 | Status: SHIPPED | OUTPATIENT
Start: 2024-08-25 | End: 2024-09-04

## 2024-08-26 NOTE — PROGRESS NOTES
Called and informed pt of culture results and new ABT orders. Pt stated that she will  the oral ABT. Pt stated that she did not  the gentamicin ointment because it was over 100 dollars. Pt Is having difficulty packing her wound with the packing strips as she does not have any help available. Pt is requesting for an alternative if available..

## 2024-08-26 NOTE — PROGRESS NOTES
Per provider, ok to call in Gentamicin eye drops. New ordered called into pharmacy on file. Pt notified of new order. Pt stated understanding and will call clinic back if she has any issues.

## 2024-08-28 RX ORDER — LETROZOLE 2.5 MG/1
2.5 TABLET, FILM COATED ORAL DAILY
Qty: 90 TABLET | Refills: 0 | Status: SHIPPED | OUTPATIENT
Start: 2024-08-28 | End: 2024-12-02

## 2024-08-28 NOTE — PROGRESS NOTES
CHIEF COMPLAINT:     Chief Complaint   Patient presents with    Wound Care     Patients is here for a follow up. Patients denies any concern      HPI:   Information obtained from Patient and chart  8-22-24 INITIAL:  Patient is a 75 yo female with pmhx of htn, prediabetes (7-31-24 a1c 5.7), obesity, wound dehiscence, hl, peritoneal carcinomatosis. Patient has an open wound to her right groin/lower abdomen from a previous drain site from her HIPEC (hyperthermic intraperitoneal chemotherapy) surgery.  Early 2024 patient noted a lump in her abdomen, she saw her oncologist dr maximiliano horowitz and he noted right groin abscess which is draining.   Her pcp recommended she see dr haque and she has been seeing him for frequent debridements of hypergranulation tissue in the office.  She did have ct scan march 2024 and it noted that the masslike region in right inguinal location is stable.  On 7-9-24 it was noted that the wound was fully resolved.  However patient saw primary and it was noted the wound was draining again. She was started on po Bactrim and referred to wound clinic. She has been packing with packing strip.  She states her next Ct scan is set up for September.  Today on examination and exploration of the wound, the tissue is very friable, smooth and non granular.  On exploration with forceps the forceps caught on tissue, this was removed. There was one suture and 3 pieces of tissue that appeared to be a foreign substance (mesh?adaptic dressing?).  The tissue was sent to lab for cultures (afb, fungal, aerobic and anaerobic) as well as pathology for identification. Patient will utilize topical gent on packing strip until we receive sensitivities.  I also asked the patient to wear supportive undergarments.    8-29-24 patient returns.  Culture 4+ staph, pathology still pending, message sent to lab for update via [employer].  When we called patient regarding the po abx she said she was having issues packing the wound and  did not  the topical abx due to cost.   I had the rn call in gent gtts. Patient states this is working much better.  Patient was started on po bactrim, she is tolerating that well.  We discussed that bacteria is separate from cancer, one does not cause the other. We discussed utilizing hibiclens wash 1 x a week to bring down overall bacteria body burdern in skin folds etc.  Patient will continue with the gent for 2 weeks. No c/o pain or s/s of acute infection    MEDICATIONS:     Current Outpatient Medications:     LETROZOLE 2.5 MG Oral Tab, TAKE 1 TABLET(2.5 MG) BY MOUTH DAILY, Disp: 90 tablet, Rfl: 0    sulfamethoxazole-trimethoprim -160 MG Oral Tab per tablet, Take 1 tablet by mouth 2 (two) times daily for 10 days., Disp: 20 tablet, Rfl: 0    gentamicin 0.1 % External Ointment, Apply 1 Application topically 3 (three) times daily., Disp: 45 g, Rfl: 0    levothyroxine 100 MCG Oral Tab, Take 1 tablet (100 mcg total) by mouth daily., Disp: 90 tablet, Rfl: 0    levothyroxine 100 MCG Oral Tab, Take 1 tablet (100 mcg total) by mouth before breakfast., Disp: , Rfl:     amLODIPine 5 MG Oral Tab, Take 1 tablet (5 mg total) by mouth daily., Disp: 90 tablet, Rfl: 0    calcium carbonate 500 MG Oral Chew Tab, Chew 1 tablet (500 mg total) by mouth daily as needed for Heartburn. (Patient not taking: Reported on 8/22/2024), Disp: , Rfl:     NON FORMULARY, Take 1 capsule by mouth daily. E[employer] supplement, Disp: , Rfl:   ALLERGIES:     Allergies   Allergen Reactions    Adhesive Tape RASH      REVIEW OF SYSTEMS:   This information was obtained from the patient/family and chart.    See HPI for pertinent positives, otherwise 10 pt ROS negative.    HISTORY:   Past medical, surgical, family and social history updated where appropriate.      PHYSICAL EXAM:     Vitals:    08/29/24 1423   BP: 143/70   Pulse: 94   Resp: 16   Temp: 98 °F (36.7 °C)        Estimated body mass index is 33.64 kg/m² as calculated from the  following:    Height as of 8/22/24: 64\".    Weight as of 8/22/24: 196 lb (88.9 kg).   No results found for: \"PGLU\"    Vital signs reviewed.Appears stated age, well groomed.    Constitutional:  Bp wnl for patient. Pulse Regular and wnl for patient. Respirations easy and unlabored. Temperature wnl. Obese.  Appearance neat and clean. Appears in no acute distress. Well nourished and well developed.  Musculoskeletal:  Gait and station stable   Integumentary:  refer to wound characteristics and images   Psychiatric:  Judgment and insight intact. Alert and oriented times 3. No evidence of depression, anxiety, or agitation. Calm, cooperative, and communicative.    DIAGNOSTICS:     Lab Results   Component Value Date    BUN 14 08/22/2024    CREATSERUM 0.87 08/22/2024    ALB 4.7 08/22/2024    TP 7.2 08/22/2024    A1C 5.7 (A) 07/31/2024   3-14-24 CT abdomen pelvis  ABDOMINAL WALL:  Right ventral inguinal soft tissue thickening to surgical clip is again noted.  This measures approximately 5.3 x 1.9 cm (image 100) is stable.  This extends into additional right inguinal region that measures 4.5 x 2.3 cm that is stable (image 25).  Caudal to this spiculated region of soft tissue density measuring 2.8 x 1.8 cm (image 109) is stable.   CONCLUSION:     1. Soft tissue thickening and masslike region in the right inguinal location is stable.  Multiple surgical skin clips are noted in this region.    2. Minimal infiltration the fat and calcification adjacent sigmoid colon is nonspecific.     WOUND ASSESSMENT:     Wound 08/24/23 Incision Hip Left;Posterior (Active)   Date First Assessed/Time First Assessed: 08/24/23 1129   Present on Original Admission: No  Primary Wound Type: Incision  Location: Hip  Wound Location Orientation: Left;Posterior      Assessments 8/24/2023 12:12 PM 8/25/2023  9:00 AM   Site Assessment Clean;Dry Unable to assess   Closure Sutures;Steri-strips Unable to assess   Drainage Amount None None   Treatments Abduction  pillow;Compression Sleeve;Ice Therapy Wrap Abduction pillow;Compression Sleeve;Ice Therapy Wrap   Dressing Aquacel;Dermabond Aquacel   Dressing Changed New --   Dressing Status Clean;Dry;Intact Clean;Dry;Intact       No associated orders.       Wound 08/22/24 #1 Groin Anterior;Right;Upper (Active)   Date First Assessed/Time First Assessed: 08/22/24 1022    Wound Number (Wound Clinic Only): #1  Primary Wound Type: Old surgical  Location: Groin  Wound Location Orientation: Anterior;Right;Upper      Assessments 8/22/2024 10:22 AM 8/29/2024  2:20 PM   Wound Image        Drainage Amount Small Small   Drainage Description Serosanguineous Serosanguineous   Wound Length (cm) 0.5 cm 0.5 cm   Wound Width (cm) 0.9 cm 0.6 cm   Wound Surface Area (cm^2) 0.45 cm^2 0.3 cm^2   Wound Depth (cm) 1.5 cm 0.5 cm   Wound Volume (cm^3) 0.675 cm^3 0.15 cm^3   Wound Healing % -- 78   Margins Well-defined edges Well-defined edges   Non-staged Wound Description Full thickness Full thickness   Kate-wound Assessment Induration --   Wound Granulation Tissue Red;Pink;Spongy Pink;Firm   Wound Bed Granulation (%) 100 % 100 %   Wound Odor None None   Tunneling? Yes Yes   Number of Tunnels 1 1   Tunnel 1 Location 3 3   Tunnel 1 Depth 3.2 2.6   Undermining? No --   Sinus Tracts? No --       No associated orders.          ASSESSMENT AND PLAN:    1. 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 Peritoneal carcinomatosis (HCC)    3. Non-pressure chronic ulcer of skin of other sites with fat layer exposed (HCC)    4. Staphylococcus aureus infection        Risks, benefits, and alternatives of current treatment plan discussed in detail.  Questions and concerns addressed. Red flags to RTC or ED reviewed.  Patient (or parent) agrees to plan.      NOTE TO PATIENT: The 21st Century Cures Act makes clinical notes like these available to patients in the interest of transparency. Clinical notes are medical documents used by physicians and  care providers to communicate with each other. These documents include medical language and terminology, abbreviations, and treatment information that may sound technical and at times possibly unfamiliar. In addition, at times, the verbiage may appear blunt or direct. These documents are one tool providers use to communicate relevant information and clinical opinions of the care providers in a way that allows common understanding of the clinical context.   I spent   29 minutes with the patient. This time included:    preparing to see the patient (eg, review notes and recent diagnostics),  seeing the patient, obtaining and/or reviewing separately obtained history, performing a medically appropriate examination and/or evaluation, counseling and educating the patient, documenting in the record,  DISCHARGE:      Patient Instructions   Please return: 2 weeks (any day of the week is fine)      Patient discharge and wound care instructions  Viola Lindsay  8/29/2024      You may shower and cleanse area with mild soap and water (use hibiclens wash 1 x week), rinse wound with ANASEPT or other hypochlorus cleanser, dab dry with gauze and apply your dressings as directed.      APPLY gentamicin drops into the wound, then place packing strip and place into the wound (the wound goes toward the center) until you meet resistance. Leave a tail and cover.    Wear compression shorts to support your belly.    Nutrition and blood sugar control:  Focus on the following:  Protein: Meats, beans, eggs, milk and yogurt particularly Greek yogurt), tofu, soy nuts, soy protein products (Follow the protein handout in your welcome folder)  Vitamin C: Citrus fruits and juices, strawberries, tomatoes, tomato juice, peppers, baked potatoes, spinach, broccoli, cauliflower, [place] sprouts, cabbage  Vitamin A: Dark green, leafy vegetables, orange or yellow vegetables, cantaloupe, fortified dairy products, liver, fortified cereals  Zinc: Fortified  cereals, red meats, seafood  Consider supplementing with Mark by [employer]. It can be purchased on amazon, [employer] website, or local pharmacy may be able to order it for you.  (These are essential branch chain amino acids that help with tissue building and wound healing).   When your blood sugar is consistently elevated greater than 180 your body can't heal or fight infection.     Concerns:  Signs of infection may include the following:  Increase in redness  Red \"streaks\" from wound  Increase in swelling  Fever  Unusual odor  Change in the amount of wound drainage     Should you experience any significant changes in your wound(s) or have any questions regarding your home care instructions please contact the [facility] @ [phone] If after regular business hours, please call your family doctor or local emergency room. The treatment plan has been discussed at length between you and your provider. Follow all instructions carefully, it is very important. If you do not follow all instructions you are at risk of your wound not healing, infection, possible loss of limb and even loss of life.    Ramandeep Brunner FNP-C, CWCN-AP, CFCN, CSWS, WCC, DWC  8/29/2024

## 2024-08-29 ENCOUNTER — OFFICE VISIT (OUTPATIENT)
Dept: WOUND CARE | Facility: HOSPITAL | Age: 74
End: 2024-08-29
Attending: NURSE PRACTITIONER
Payer: MEDICARE

## 2024-08-29 VITALS
TEMPERATURE: 98 F | SYSTOLIC BLOOD PRESSURE: 143 MMHG | RESPIRATION RATE: 16 BRPM | DIASTOLIC BLOOD PRESSURE: 70 MMHG | HEART RATE: 94 BPM

## 2024-08-29 DIAGNOSIS — C79.89 SECONDARY MALIGNANT NEOPLASM OF SOFT TISSUES OF ABDOMEN (HCC): Primary | ICD-10-CM

## 2024-08-29 DIAGNOSIS — A49.01 STAPHYLOCOCCUS AUREUS INFECTION: ICD-10-CM

## 2024-08-29 DIAGNOSIS — L98.492 NON-PRESSURE CHRONIC ULCER OF SKIN OF OTHER SITES WITH FAT LAYER EXPOSED (HCC): ICD-10-CM

## 2024-08-29 DIAGNOSIS — C78.6 PERITONEAL CARCINOMATOSIS (HCC): ICD-10-CM

## 2024-08-29 PROCEDURE — 99213 OFFICE O/P EST LOW 20 MIN: CPT | Performed by: NURSE PRACTITIONER

## 2024-08-29 NOTE — PATIENT INSTRUCTIONS
Please return: 2 weeks (any day of the week is fine)      Patient discharge and wound care instructions  Viola ALVARADO Neftali  8/29/2024      You may shower and cleanse area with mild soap and water (use hibiclens wash 1 x week), rinse wound with ANASEPT or other hypochlorus cleanser, dab dry with gauze and apply your dressings as directed.      APPLY gentamicin drops into the wound, then place packing strip and place into the wound (the wound goes toward the center) until you meet resistance. Leave a tail and cover.    Wear compression shorts to support your belly.    Nutrition and blood sugar control:  Focus on the following:  Protein: Meats, beans, eggs, milk and yogurt particularly Greek yogurt), tofu, soy nuts, soy protein products (Follow the protein handout in your welcome folder)  Vitamin C: Citrus fruits and juices, strawberries, tomatoes, tomato juice, peppers, baked potatoes, spinach, broccoli, cauliflower, [place] sprouts, cabbage  Vitamin A: Dark green, leafy vegetables, orange or yellow vegetables, cantaloupe, fortified dairy products, liver, fortified cereals  Zinc: Fortified cereals, red meats, seafood  Consider supplementing with Mark by [employer]. It can be purchased on amazon, [employer] website, or local pharmacy may be able to order it for you.  (These are essential branch chain amino acids that help with tissue building and wound healing).   When your blood sugar is consistently elevated greater than 180 your body can't heal or fight infection.     Concerns:  Signs of infection may include the following:  Increase in redness  Red \"streaks\" from wound  Increase in swelling  Fever  Unusual odor  Change in the amount of wound drainage     Should you experience any significant changes in your wound(s) or have any questions regarding your home care instructions please contact the [facility] @ [phone] If after regular business hours, please call your family doctor or local emergency  room. The treatment plan has been discussed at length between you and your provider. Follow all instructions carefully, it is very important. If you do not follow all instructions you are at risk of your wound not healing, infection, possible loss of limb and even loss of life.

## 2024-08-29 NOTE — PROGRESS NOTES
.Weekly Wound Education Note    Teaching Provided To: Patient  Training Topics: Dressing;Cleasing and general instructions;Discharge instructions  Training Method: Explain/Verbal;Written  Training Response: Patient responds and understands        Notes: Wound stable. Continue gentamicin drops, plain packing strip and dry dressing.

## 2024-09-11 NOTE — PROGRESS NOTES
CHIEF COMPLAINT:     Chief Complaint   Patient presents with    Wound Care     Patients is here for a follow up. Patients denies any concern      HPI:   Information obtained from Patient and chart  8-22-24 INITIAL:  Patient is a 73 yo female with pmhx of htn, prediabetes (7-31-24 a1c 5.7), obesity, wound dehiscence, hl, peritoneal carcinomatosis. Patient has an open wound to her right groin/lower abdomen from a previous drain site from her HIPEC (hyperthermic intraperitoneal chemotherapy) surgery.  Early 2024 patient noted a lump in her abdomen, she saw her oncologist dr maximiliano horowitz and he noted right groin abscess which is draining.   Her pcp recommended she see dr haque and she has been seeing him for frequent debridements of hypergranulation tissue in the office.  She did have ct scan march 2024 and it noted that the masslike region in right inguinal location is stable.  On 7-9-24 it was noted that the wound was fully resolved.  However patient saw primary and it was noted the wound was draining again. She was started on po Bactrim and referred to wound clinic. She has been packing with packing strip.  She states her next Ct scan is set up for September.  Today on examination and exploration of the wound, the tissue is very friable, smooth and non granular.  On exploration with forceps the forceps caught on tissue, this was removed. There was one suture and 3 pieces of tissue that appeared to be a foreign substance (mesh?adaptic dressing?).  The tissue was sent to lab for cultures (afb, fungal, aerobic and anaerobic) as well as pathology for identification. Patient will utilize topical gent on packing strip until we receive sensitivities.  I also asked the patient to wear supportive undergarments.    8-29-24 patient returns.  Culture 4+ staph, pathology still pending, message sent to lab for update via [employer].  When we called patient regarding the po abx she said she was having issues packing the wound and  did not  the topical abx due to cost.   I had the rn call in gent gtts. Patient states this is working much better.  Patient was started on po bactrim, she is tolerating that well.  We discussed that bacteria is separate from cancer, one does not cause the other. We discussed utilizing hibiclens wash 1 x a week to bring down overall bacteria body burdern in skin folds etc.  Patient will continue with the gent for 2 weeks. No c/o pain or s/s of acute infection    9-12-24 patient returns.  Patient has been utilizing gent gtts for the last 2 weeks.  She completed the po bactrim.  The pathology did not show any malignancy and did not indentify that there was any foreign body (mesh?).  Wound is about the same. The base of the wound still feels hard/striated/fibrotic.  I asked patient to please schedule her ct scan. We will also have her pack with hydrofera classic (she has had difficulty packing the wound with soft dressings).  I d/w her that we may need to get an opinion from dr. Strickland. No acute s/s of infection or c/o pain at this time. Patient will f/u after ct scan.    MEDICATIONS:     Current Outpatient Medications:     LETROZOLE 2.5 MG Oral Tab, TAKE 1 TABLET(2.5 MG) BY MOUTH DAILY, Disp: 90 tablet, Rfl: 0    gentamicin 0.1 % External Ointment, Apply 1 Application topically 3 (three) times daily., Disp: 45 g, Rfl: 0    levothyroxine 100 MCG Oral Tab, Take 1 tablet (100 mcg total) by mouth daily., Disp: 90 tablet, Rfl: 0    levothyroxine 100 MCG Oral Tab, Take 1 tablet (100 mcg total) by mouth before breakfast., Disp: , Rfl:     amLODIPine 5 MG Oral Tab, Take 1 tablet (5 mg total) by mouth daily., Disp: 90 tablet, Rfl: 0    calcium carbonate 500 MG Oral Chew Tab, Chew 1 tablet (500 mg total) by mouth daily as needed for Heartburn. (Patient not taking: Reported on 8/22/2024), Disp: , Rfl:     NON FORMULARY, Take 1 capsule by mouth daily. E[employer] supplement, Disp: , Rfl:   ALLERGIES:     Allergies    Allergen Reactions    Adhesive Tape RASH      REVIEW OF SYSTEMS:   This information was obtained from the patient/family and chart.    See HPI for pertinent positives, otherwise 10 pt ROS negative.    HISTORY:   Past medical, surgical, family and social history updated where appropriate.      PHYSICAL EXAM:     Vitals:    09/12/24 0900   BP: 140/60   Pulse: 86   Resp: 16   Temp: 97.8 °F (36.6 °C)          Estimated body mass index is 33.64 kg/m² as calculated from the following:    Height as of 8/22/24: 64\".    Weight as of 8/22/24: 196 lb (88.9 kg).   No results found for: \"PGLU\"    Vital signs reviewed.Appears stated age, well groomed.    Constitutional:  Bp wnl for patient. Pulse Regular and wnl for patient. Respirations easy and unlabored. Temperature wnl. Obese.  Appearance neat and clean. Appears in no acute distress. Well nourished and well developed.  Musculoskeletal:  Gait and station stable   Integumentary:  refer to wound characteristics and images   Psychiatric:  Judgment and insight intact. Alert and oriented times 3. No evidence of depression, anxiety, or agitation. Calm, cooperative, and communicative.    DIAGNOSTICS:     Lab Results   Component Value Date    BUN 14 08/22/2024    CREATSERUM 0.87 08/22/2024    ALB 4.7 08/22/2024    TP 7.2 08/22/2024    A1C 5.7 (A) 07/31/2024 8-22-24 abdominal tissue from wound  Final Diagnosis:   Abdominal tissue:  -Edematous soft tissue with abundant lipid-laden macrophages and multinucleated cells, with background acute and chronic inflammatory cells.  -No evidence of malignancy in the submitted material.  -See comment.     3-14-24 CT abdomen pelvis  ABDOMINAL WALL:  Right ventral inguinal soft tissue thickening to surgical clip is again noted.  This measures approximately 5.3 x 1.9 cm (image 100) is stable.  This extends into additional right inguinal region that measures 4.5 x 2.3 cm that is stable (image 25).  Caudal to this spiculated region of soft tissue  density measuring 2.8 x 1.8 cm (image 109) is stable.   CONCLUSION:     1. Soft tissue thickening and masslike region in the right inguinal location is stable.  Multiple surgical skin clips are noted in this region.    2. Minimal infiltration the fat and calcification adjacent sigmoid colon is nonspecific.     WOUND ASSESSMENT:     Wound 08/24/23 Incision Hip Left;Posterior (Active)   Date First Assessed/Time First Assessed: 08/24/23 1129   Present on Original Admission: No  Primary Wound Type: Incision  Location: Hip  Wound Location Orientation: Left;Posterior      Assessments 8/24/2023 12:12 PM 8/25/2023  9:00 AM   Site Assessment Clean;Dry Unable to assess   Closure Sutures;Steri-strips Unable to assess   Drainage Amount None None   Treatments Abduction pillow;Compression Sleeve;Ice Therapy Wrap Abduction pillow;Compression Sleeve;Ice Therapy Wrap   Dressing Aquacel;Dermabond Aquacel   Dressing Changed New --   Dressing Status Clean;Dry;Intact Clean;Dry;Intact       No associated orders.       Wound 08/22/24 #1 Groin Anterior;Right;Upper (Active)   Date First Assessed/Time First Assessed: 08/22/24 1022    Wound Number (Wound Clinic Only): #1  Primary Wound Type: Old surgical  Location: Groin  Wound Location Orientation: Anterior;Right;Upper      Assessments 8/22/2024 10:22 AM 9/12/2024  8:52 AM   Wound Image        Drainage Amount Small Moderate   Drainage Description Serosanguineous Serosanguineous   Wound Length (cm) 0.5 cm 0.3 cm   Wound Width (cm) 0.9 cm 0.4 cm   Wound Surface Area (cm^2) 0.45 cm^2 0.12 cm^2   Wound Depth (cm) 1.5 cm 2.2 cm   Wound Volume (cm^3) 0.675 cm^3 0.264 cm^3   Wound Healing % -- 61   Margins Well-defined edges Well-defined edges   Non-staged Wound Description Full thickness Full thickness   Kate-wound Assessment Induration Clean;Induration   Wound Granulation Tissue Red;Pink;Spongy Firm;Red   Wound Bed Granulation (%) 100 % 100 %   Wound Odor None None   Tunneling? Yes Yes   Number  of Tunnels 1 1   Tunnel 1 Location 3 3   Tunnel 1 Depth 3.2 2.6   Undermining? No --   Sinus Tracts? No --       No associated orders.          ASSESSMENT AND PLAN:    1. 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  Questions and concerns addressed. Red flags to RTC or ED reviewed.  Patient (or parent) agrees to plan.      NOTE TO PATIENT: The 21st Century Cures Act makes clinical notes like these available to patients in the interest of transparency. Clinical notes are medical documents used by physicians and care providers to communicate with each other. These documents include medical language and terminology, abbreviations, and treatment information that may sound technical and at times possibly unfamiliar. In addition, at times, the verbiage may appear blunt or direct. These documents are one tool providers use to communicate relevant information and clinical opinions of the care providers in a way that allows common understanding of the clinical context.   I spent  30 minutes with the patient. This time included:    preparing to see the patient (eg, review notes and recent diagnostics),  seeing the patient, obtaining and/or reviewing separately obtained history, performing a medically appropriate examination and/or evaluation, counseling and educating the patient, documenting in the record,  DISCHARGE:      Patient Instructions   Please return: 1-2 weeks (any day of the week is fine, should be after the CT scan)      Patient discharge and wound care instructions  Viola Ayersek  9/12/2024        You may shower and cleanse area with mild soap and water (use hibiclens wash 1 x week), rinse wound with ANASEPT or other hypochlorus cleanser, dab dry with gauze and apply your dressings as directed.      APPLY hydrofera classic strip into the wound. Leave a tail and cover.    Wear compression  shorts to support your belly.    Nutrition and blood sugar control:  Focus on the following:  Protein: Meats, beans, eggs, milk and yogurt particularly Greek yogurt), tofu, soy nuts, soy protein products (Follow the protein handout in your welcome folder)  Vitamin C: Citrus fruits and juices, strawberries, tomatoes, tomato juice, peppers, baked potatoes, spinach, broccoli, cauliflower, Arthur sprouts, cabbage  Vitamin A: Dark green, leafy vegetables, orange or yellow vegetables, cantaloupe, fortified dairy products, liver, fortified cereals  Zinc: Fortified cereals, red meats, seafood  Consider supplementing with Mark by [employer]. It can be purchased on amazon, [employer] website, or local pharmacy may be able to order it for you.  (These are essential branch chain amino acids that help with tissue building and wound healing).   When your blood sugar is consistently elevated greater than 180 your body can't heal or fight infection.     Concerns:  Signs of infection may include the following:  Increase in redness  Red \"streaks\" from wound  Increase in swelling  Fever  Unusual odor  Change in the amount of wound drainage     Should you experience any significant changes in your wound(s) or have any questions regarding your home care instructions please contact the [facility] @ [phone] If after regular business hours, please call your family doctor or local emergency room. The treatment plan has been discussed at length between you and your provider. Follow all instructions carefully, it is very important. If you do not follow all instructions you are at risk of your wound not healing, infection, possible loss of limb and even loss of life.    Ramandeep Brunner FNP-C, CWCN-AP, CFCN, CSWS, WCC, DWC  9/12/2024

## 2024-09-12 ENCOUNTER — OFFICE VISIT (OUTPATIENT)
Dept: WOUND CARE | Facility: HOSPITAL | Age: 74
End: 2024-09-12
Attending: NURSE PRACTITIONER
Payer: MEDICARE

## 2024-09-12 VITALS
DIASTOLIC BLOOD PRESSURE: 60 MMHG | SYSTOLIC BLOOD PRESSURE: 140 MMHG | TEMPERATURE: 98 F | RESPIRATION RATE: 16 BRPM | HEART RATE: 86 BPM

## 2024-09-12 DIAGNOSIS — L98.492 NON-PRESSURE CHRONIC ULCER OF SKIN OF OTHER SITES WITH FAT LAYER EXPOSED (HCC): Primary | ICD-10-CM

## 2024-09-12 PROCEDURE — 99214 OFFICE O/P EST MOD 30 MIN: CPT | Performed by: NURSE PRACTITIONER

## 2024-09-12 NOTE — PATIENT INSTRUCTIONS
Please return: 1-2 weeks (any day of the week is fine, should be after the CT scan)      Patient discharge and wound care instructions  Viola ALVARADO Neftali  9/12/2024        You may shower and cleanse area with mild soap and water (use hibiclens wash 1 x week), rinse wound with ANASEPT or other hypochlorus cleanser, dab dry with gauze and apply your dressings as directed.      APPLY hydrofera classic strip into the wound. Leave a tail and cover.    Wear compression shorts to support your belly.    Nutrition and blood sugar control:  Focus on the following:  Protein: Meats, beans, eggs, milk and yogurt particularly Greek yogurt), tofu, soy nuts, soy protein products (Follow the protein handout in your welcome folder)  Vitamin C: Citrus fruits and juices, strawberries, tomatoes, tomato juice, peppers, baked potatoes, spinach, broccoli, cauliflower, [place] sprouts, cabbage  Vitamin A: Dark green, leafy vegetables, orange or yellow vegetables, cantaloupe, fortified dairy products, liver, fortified cereals  Zinc: Fortified cereals, red meats, seafood  Consider supplementing with Mark by [employer]. It can be purchased on amazon, [employer] website, or local pharmacy may be able to order it for you.  (These are essential branch chain amino acids that help with tissue building and wound healing).   When your blood sugar is consistently elevated greater than 180 your body can't heal or fight infection.     Concerns:  Signs of infection may include the following:  Increase in redness  Red \"streaks\" from wound  Increase in swelling  Fever  Unusual odor  Change in the amount of wound drainage     Should you experience any significant changes in your wound(s) or have any questions regarding your home care instructions please contact the wound center [facility] @ [phone] If after regular business hours, please call your family doctor or local emergency room. The treatment plan has been discussed at length between you and  your provider. Follow all instructions carefully, it is very important. If you do not follow all instructions you are at risk of your wound not healing, infection, possible loss of limb and even loss of life.

## 2024-09-12 NOTE — PROGRESS NOTES
.Weekly Wound Education Note    Teaching Provided To: Patient  Training Topics: Dressing;Cleasing and general instructions;Discharge instructions  Training Method: Explain/Verbal;Written  Training Response: Patient responds and understands        Notes: Wound stable. Dressing changed to wick of hydrofera classic, and bordered gauze. Pt encouraged to get CT done. Pt stated understanding.

## 2024-09-30 ENCOUNTER — HOSPITAL ENCOUNTER (OUTPATIENT)
Dept: CT IMAGING | Age: 74
Discharge: HOME | End: 2024-09-30
Attending: INTERNAL MEDICINE
Payer: MEDICARE

## 2024-09-30 DIAGNOSIS — C79.9 METASTASIS FROM MALIGNANT NEOPLASM OF UTERUS (HCC): ICD-10-CM

## 2024-09-30 DIAGNOSIS — R97.1 ELEVATED CANCER ANTIGEN 125 (CA 125): ICD-10-CM

## 2024-09-30 DIAGNOSIS — C55 METASTASIS FROM MALIGNANT NEOPLASM OF UTERUS (HCC): ICD-10-CM

## 2024-09-30 DIAGNOSIS — Z79.811 LONG TERM (CURRENT) USE OF AROMATASE INHIBITORS: ICD-10-CM

## 2024-09-30 DIAGNOSIS — D50.9 IRON DEFICIENCY ANEMIA, UNSPECIFIED IRON DEFICIENCY ANEMIA TYPE: ICD-10-CM

## 2024-09-30 LAB
CREAT BLD-MCNC: 0.9 MG/DL
EGFRCR SERPLBLD CKD-EPI 2021: 67 ML/MIN/1.73M2 (ref 60–?)

## 2024-09-30 PROCEDURE — 74177 CT ABD & PELVIS W/CONTRAST: CPT | Performed by: INTERNAL MEDICINE

## 2024-09-30 PROCEDURE — 71260 CT THORAX DX C+: CPT | Performed by: INTERNAL MEDICINE

## 2024-09-30 PROCEDURE — 82565 ASSAY OF CREATININE: CPT

## 2024-10-04 ENCOUNTER — TELEPHONE (OUTPATIENT)
Dept: ORTHOPEDICS CLINIC | Facility: CLINIC | Age: 74
End: 2024-10-04

## 2024-10-04 DIAGNOSIS — Z96.642 STATUS POST LEFT HIP REPLACEMENT: Primary | ICD-10-CM

## 2024-10-04 NOTE — TELEPHONE ENCOUNTER
Xr ordered and scheduled for upcoming appointment    My chart message sent to patient to arrive earlier for imaging

## 2024-10-07 ENCOUNTER — HOSPITAL ENCOUNTER (OUTPATIENT)
Dept: GENERAL RADIOLOGY | Age: 74
Discharge: HOME | End: 2024-10-07
Attending: ORTHOPAEDIC SURGERY
Payer: MEDICARE

## 2024-10-07 ENCOUNTER — OFFICE VISIT (OUTPATIENT)
Facility: CLINIC | Age: 74
End: 2024-10-07
Payer: MEDICARE

## 2024-10-07 DIAGNOSIS — Z96.642 STATUS POST LEFT HIP REPLACEMENT: ICD-10-CM

## 2024-10-07 DIAGNOSIS — M17.12 PRIMARY OSTEOARTHRITIS OF LEFT KNEE: ICD-10-CM

## 2024-10-07 DIAGNOSIS — Z96.642 STATUS POST LEFT HIP REPLACEMENT: Primary | ICD-10-CM

## 2024-10-07 PROCEDURE — 20610 DRAIN/INJ JOINT/BURSA W/O US: CPT | Performed by: ORTHOPAEDIC SURGERY

## 2024-10-07 PROCEDURE — 73502 X-RAY EXAM HIP UNI 2-3 VIEWS: CPT | Performed by: ORTHOPAEDIC SURGERY

## 2024-10-07 PROCEDURE — 99214 OFFICE O/P EST MOD 30 MIN: CPT | Performed by: ORTHOPAEDIC SURGERY

## 2024-10-07 RX ORDER — TRIAMCINOLONE ACETONIDE 40 MG/ML
40 INJECTION, SUSPENSION INTRA-ARTICULAR; INTRAMUSCULAR ONCE
Status: COMPLETED | OUTPATIENT
Start: 2024-10-07 | End: 2024-10-07

## 2024-10-07 RX ADMIN — TRIAMCINOLONE ACETONIDE 40 MG: 40 INJECTION, SUSPENSION INTRA-ARTICULAR; INTRAMUSCULAR at 12:11:00

## 2024-10-07 NOTE — PROCEDURES
Risks and benefits of knee injection discussed with the patient, with risks including but not limited to pain and swelling at the injection site and/or within the knee joint, infection, elevation in blood pressure and/or glucose levels, facial flushing.  After informed consent, the patient's left knee was marked, locally anesthetized with skin refrigerant, prepped with topical antiseptic, and injected with a mixture of 1mL 40mg/mL Kenalog, 2mL 1% lidocaine and 2mL 0.5% marcaine through the inferolateral portal.  A band-aid was applied.  The patient tolerated the procedure well.    Solomon Contreras MD, FAAOS  [facility] Orthopedic Surgery  Phone [phone]  Fax [phone]

## 2024-10-07 NOTE — PROGRESS NOTES
[facility] Progress Note    Subjective: 74-year-old female returning for 1 year follow-up of left hip replacement.  She reports she is doing very well, recovered well after surgery, notes that she is walking more than a mile every day.  Hip arthritis pain is completely resolved, and she is happy with her outcome.  She does report that the left knee is her limiting factor at this point.  She states it is not terrible, and had she not had the appointment for her hip she would not necessarily set up an appointment for the knee, but as long as she is here she does report pain due to known arthritis in the left knee.    Objective: Patient appears comfortable, very pleasant, ambulating without assist device.  She demonstrates full motion of the left knee, there is mild tenderness about the knee.      Imaging: X-rays of the left hip and pelvis personally viewed, independently interpreted and radiology report read.  These were compared to films from 1 year ago, which demonstrate hybrid left total hip arthroplasty unchanged from last year's films.  Femoral head is located centrally within the acetabular shell.  There is functional posterior tilt of the pelvis.  Cup position is unchanged, signs of ingrowth present including trabecular streaming in zone 1.  Cemented stem appears unchanged in position, grade C cement mantle.      Assessment/Plan: 74-year-old female 1 year postop status post posterior hybrid left total hip arthroplasty, doing very well, also with left knee osteoarthritis.  From the hip standpoint continue activities as tolerated.  Advised follow-up in 5 years with repeat x-rays.  From the knee standpoint, she has done injection in the past, most recently to the right knee, and this has provided excellent relief of her pain symptoms.  She would like to attempt this for her left knee today and this was performed.  Advised this can be repeated as often as every 3 months as needed.    Solomon Contreras MD,  CHAMPOS  [facility] Orthopaedic Surgery  Phone [phone]  Fax [phone]

## 2024-10-18 DIAGNOSIS — I10 PRIMARY HYPERTENSION: ICD-10-CM

## 2024-10-19 RX ORDER — AMLODIPINE BESYLATE 5 MG/1
5 TABLET ORAL DAILY
Qty: 90 TABLET | Refills: 0 | Status: SHIPPED | OUTPATIENT
Start: 2024-10-19

## 2024-10-19 NOTE — TELEPHONE ENCOUNTER
Name from pharmacy: AMLODIPINE BESYLATE 5MG TABLETS          Will file in chart as: AMLODIPINE 5 MG Oral Tab    Sig: TAKE 1 TABLET(5 MG) BY MOUTH DAILY    Disp: 90 tablet    Refills: 0 (Pharmacy requested: Not specified)    Start: 10/18/2024    Class: Normal    Non-formulary For: Primary hypertension    Last ordered: 3 months ago (7/19/2024) by Zoe Muro DO    Last refill: 7/19/2024    Rx #: [id number]    Hypertension Medications Protocol Znnuhw80/18/2024 10:17 AM   Protocol Details Last BP reading less than 140/90    CMP or BMP in past 12 months    In person appointment or virtual visit in the past 12 mos or appointment in next 3 mos    EGFRCR or GFRNAA > 50      To be filled at: [id number] [address] [place] [address] AT [place] [address], [phone], [phone]     LOV:07/31/2024  RTC:6 months   Labs:08/22/2024  Last filled:07/19/2024  Future Appointments   Date Time Provider Department Center   10/22/2024  9:00 AM Debi Zamora MD [facility]   10/30/2024 10:30 AM Trever Strickland MD [facility]   10/30/2024 11:00 AM Zainab Dietz MD [facility]

## 2024-10-21 NOTE — PROGRESS NOTES
[facility] Note    Visit Diagnosis:  1. 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 Elevated cancer antigen 125 ()        History of Present Illness:   74F here for follow up for a RUL PE (dx 7/13/21) suspected to be 2/2 malignancy. She also has a recurrent endometrial adenocarcinoma, endometrioid type, Grade 1 which was ER+. She is now on letrozole since 09/10/21 She had a repeat excision of a LLQ abdominal wall tumor on 5/17/22. She restarted letrozole afterwards.      Hematology/Oncology History:   -She notes having a total abdominal hysterectomy about 25 years ago.    -6/30/21: CT AP showed a greater than 12 cm mass with areas of cystic and necrotic change.    -7/7/21, she underwent a biopsy with Dr. Strickland which showed atypical endometrioid proliferation suspicious for endometrioid adenocarcinoma which was ER positive and negative for GATA3.  93.6.    -7/13/21: staging CT chest showed a short segment PE involving the lobar and segmental branch of the RUL. Bilateral dopplers from 7/13/21 negative along with an enlarged thyroid gland. Bilateral dopplers negative. For her PE, she was started on Lovenox.    -8/10/21: RLQ Radical Abdominal soft tissue resection with Dr. Strickland on 8/10/21. Endometrial adenocarcinoma, endometrioid type, 13.5 cm, Grade 1, negative margins, 0/2 LN.     -9/10/21: Started on Letrozole     -4/6/22: CT CAP: RUL 3 mm nodule no well appreciated anymore. PE resolved. Interval decrease in R pelvic abscess. R Pelvic node smaller. There is LLQ soft tissue density that is bigger (2.6 x 2.9 from 2.4 x 2.8 cm). Case was discussed at tumor board, plan for surgical resection.    -5/17/22: LLQ abdominal wall excision: FIGO grade 1 endometrial carcinoma, endometrioid type. Measures 5 x 2.7 x 2.5 cm. Negative margins. Omental biopsy negative.     -05/24/23: CT CAP: 1. There is no definite evidence of metastatic disease within the chest, abdomen, or pelvis.   2. Soft tissue density within the subcutaneous tissues of the right inguinal region is in area of previous cystic fluid collection likely representing post surgical changes with granulation tissue. Possibility of local recurrence within the abdominal wall is not completely excluded and continued close interval follow-up is recommended in approximately 6 months.    -03/14/24: CT CAP: infiltration of fat adjacent to sigmoid colon is non specific. Right ventral inguinal soft tissue thickening is again noted and stable.     -09/30/24: CT CAP-No evidence of metastatic disease. Noted to have a fluid collection in R inguinal subcutaneous tissues-may represent a draining sinus tract.     Interval History:   -CT reviewed. Has follow up with Dr. Strickland and plastic surgery  -Drainage from R groin about the same  -No new bone pain or arthralgias   -Energy is good. Going on walks   -Dexa not done    Review of Systems: 12 Point ROS was completed and pertinent positives are in the HPI    Current Outpatient Medications on File Prior to Visit   Medication Sig Dispense Refill    AMLODIPINE 5 MG Oral Tab TAKE 1 TABLET(5 MG) BY MOUTH DAILY 90 tablet 0    LETROZOLE 2.5 MG Oral Tab TAKE 1 TABLET(2.5 MG) BY MOUTH DAILY 90 tablet 0    levothyroxine 100 MCG Oral Tab Take 1 tablet (100 mcg total) by mouth daily. 90 tablet 0    calcium carbonate 500 MG Oral Chew Tab Chew 1 tablet (500 mg total) by mouth daily as needed for Heartburn.      NON FORMULARY Take 1 capsule by mouth daily. EYE Promise supplement      gentamicin 0.1 % External Ointment Apply 1 Application topically 3 (three) times daily. (Patient not taking: Reported on 10/22/2024) 45 g 0     Current Facility-Administered Medications on File Prior to Visit   Medication Dose Route Frequency Provider Last Rate Last Admin    [COMPLETED] triamcinolone acetonide (Kenalog-40) 40 MG/ML injection 40 mg  40 mg Intra-articular Once Solomon Contreras MD   40 mg at 10/07/24 1211    [COMPLETED]  iopamidol 76% (ISOVUE-370) injection for power injector  100 mL Intravenous ONCE PRN JinaZoe estrellajan,    100 mL at 09/30/24 1502     Past Medical History:    Cancer (HCC)    abdominal tumor/ uterine cancer 1999, abdominal wall 2021    High blood pressure    Hypothyroidism    post surgical    Osteoarthritis    bilateral knees    Personal history of antineoplastic chemotherapy    \"chemo wash\" with surgery    PONV (postoperative nausea and vomiting)    post colonoscopy    Prediabetes    Pulmonary embolism (HCC)    7/2021-getting filter put in 8/5/21    Visual impairment    contacts and glasses    Wears glasses     Past Surgical History:   Procedure Laterality Date    Appendectomy      Appendectomy      Colonoscopy      Finger splint Left     pinky finger    Hysterectomy      total    Other  08/10/2021    Radical resection of right lower abdominal wall and proximal right thigh metastatic carcinoma with sartorius fascia flap transposition and  repair of resultant defect with mesh. Use of SPY to evaluate tissue perfusion.    Other  05/17/2022    Cytoreductive surgery to include resection of left abdominal wall tumor and omentectomy. Hyperthermic intraperitoneal chemotherapy with Cisplatin (100 mg/m2)    Other surgical history  01/2023    total thyriodectomy    Perc placement of ivc filter  08/05/2021    Total abdom hysterectomy  1999    with BSO    Wrist fracture surgery Right 12/2021     Social History     Socioeconomic History    Marital status: Single   Tobacco Use    Smoking status: Never    Smokeless tobacco: Never    Tobacco comments:     Updated 10/7/24   Vaping Use    Vaping status: Never Used   Substance and Sexual Activity    Alcohol use: Yes     Alcohol/week: 1.0 standard drink of alcohol     Types: 1 Cans of beer per week     Comment: social- few drinks per month    Drug use: Never      Family History   Problem Relation Age of Onset    Cancer Father         lung CA       Physical Exam  Height: 162.6 cm  (5' 4.02\") (10/22 0855)  Weight: 85.3 kg (188 lb) (10/22 0855)  BSA (Calculated - sq m): 1.91 sq meters (10/22 0855)  Pulse: 87 (10/22 0855)  BP: 152/76 (10/22 0855)  Temp: 97.3 °F (36.3 °C) (10/22 0855)  Do Not Use - Resp Rate: --  SpO2: 97 % (10/22 0855)     General: NAD, AOX3  HEENT: clear op, mmm, no jvd, no scleral icterus  CV: RRR S1S2 no murmurs  Extremities: No edema  Lungs: CTAB, no increased work of breathing  Abd: soft nt nd +BS, lower quadrant abdominal nodularity stable.  Surgical scars. R groin abscess draining   Neuro: CN: II-XII grossly intact      Results:  Lab Results   Component Value Date    WBC 10.7 08/22/2024    HGB 12.3 08/22/2024    HCT 37.9 08/22/2024    MCV 87.9 08/22/2024    .0 08/22/2024     Lab Results   Component Value Date     08/22/2024    K 4.1 08/22/2024    CO2 28.0 08/22/2024     08/22/2024    BUN 14 08/22/2024    PHOS 3.1 05/18/2022    ALB 4.7 08/22/2024       No results found for: \"LDH\"    Radiology: reviewed     Pathology:   7/7/21: Abdominal Wall Bx      Final Diagnosis:   Abdominal wall mass, biopsy:  -Atypical endometrioid proliferation.  -See comment.      Electronically signed by Inocencio Renee MD on 7/8/2021 at 1556       Final Diagnosis Comment      Sections show cores of fibroadipose tissue tissue with hemosiderin deposition and small areas of atypical glandular proliferation with focal squamous morules.   Immunostains for estrogen receptor (ER) and GATA3 were performed.  The lesional cells are positive for ER and negative for GATA3.     The histologic and immunohistochemical findings are consistent with an atypical endometrioid proliferation,  suspicious for endometrioid adenocarcinoma.  This could be arising in a background of endometriosis (favored) or could represent a metastasis.  Correlation with the clinical findings is recommended.        Assessment and Plan:  74F with a PMH of hearing impairment presented to the  on 7/13/21 with a  PE.    Endometrioid adenocarcinoma: Grade 1 ER positive and GATA3 negative  -She reports a history of a total abdominal hysterectomy about 25 years ago with a possible malignancy.  -She noticed a right lower quadrant mass in June 2021 after 65 pounds of intentional weight loss.  It is unclear on how long this mass has been present.  -S/P resection of RLQ abdominal wall mass on 8/10/21 with Dr. Strickland. Mass was 13.5 cm, negative margins and Grade 1.   -Given that she had a distant recurrence years after her initial diagnosis, we discussed that she does have risk of future recurrence, however, this risk is difficult to predict. Given that she has ER+ disease, I recommended adjuvant aromatase inhibitor (I.e. letrozole 2.5 mg daily)   -We will likely continue her letrozole indefinitely given that she had a wall abdominal wall is likely metastatic from her uterus.  -Letrozole 2.5 mg daily started on 9/10/21.   -Repeat imaging after letrozole started showed slight enlargement on LLQ nodule. This nodule appears to have enlarged by only a few mm. In retrospect, it appears to have been present on old films. We discussed the case in tumor board and recommend surgical excision.  This was excised on 5/17/2022.  This had the same pathology as her initial sample.  She restarted letrozole afterwards  -Most recent imaging with stable findings. R inguinal fluid collection might be larger-she will follow up with surgery. Will repeat imaging in 6 months.   -CBC, CMP,  q6 months  -q6 month CT CAP    R Groin abscess: draining. Follow up with PCP     RUL PE: Dx 7/13/21  -Likely provoked by BMI and active malignancy   -Dopplers negative   -Baseline D-dimer 3.87   -S/P temporary IVC filter which was removed 9/23/21   -Completed 3 months of anticoagulation (Xarelto) around 10/17/21  -Avoid estrogen containing agents, tamoxifen given VTE risk  -Post op DVT ppx     CBC reviewed: Anemia resolved. WBC normal.     Osteopenia: Repeat in  12/2022. On Calcium/Vitamin D. Following with PCP. Repeat DEXA ordered    RTC in 6 months     TIANA Zamora MD  [facility]

## 2024-10-22 ENCOUNTER — OFFICE VISIT (OUTPATIENT)
Dept: HEMATOLOGY/ONCOLOGY | Facility: HOSPITAL | Age: 74
End: 2024-10-22
Attending: INTERNAL MEDICINE
Payer: MEDICARE

## 2024-10-22 VITALS
TEMPERATURE: 97 F | HEIGHT: 64.02 IN | RESPIRATION RATE: 16 BRPM | OXYGEN SATURATION: 97 % | BODY MASS INDEX: 32.1 KG/M2 | SYSTOLIC BLOOD PRESSURE: 152 MMHG | DIASTOLIC BLOOD PRESSURE: 76 MMHG | HEART RATE: 87 BPM | WEIGHT: 188 LBS

## 2024-10-22 DIAGNOSIS — C79.9 METASTASIS FROM MALIGNANT NEOPLASM OF UTERUS (HCC): ICD-10-CM

## 2024-10-22 DIAGNOSIS — Z79.811 LONG TERM (CURRENT) USE OF AROMATASE INHIBITORS: ICD-10-CM

## 2024-10-22 DIAGNOSIS — D50.9 IRON DEFICIENCY ANEMIA, UNSPECIFIED IRON DEFICIENCY ANEMIA TYPE: ICD-10-CM

## 2024-10-22 DIAGNOSIS — C55 METASTASIS FROM MALIGNANT NEOPLASM OF UTERUS (HCC): ICD-10-CM

## 2024-10-22 DIAGNOSIS — R97.1 ELEVATED CANCER ANTIGEN 125 (CA 125): ICD-10-CM

## 2024-10-22 LAB
ALBUMIN SERPL-MCNC: 4.8 G/DL (ref 3.2–4.8)
ALBUMIN/GLOB SERPL: 1.8 {RATIO} (ref 1–2)
ALP LIVER SERPL-CCNC: 61 U/L
ALT SERPL-CCNC: 13 U/L
ANION GAP SERPL CALC-SCNC: 5 MMOL/L (ref 0–18)
AST SERPL-CCNC: 16 U/L (ref ?–34)
BASOPHILS # BLD AUTO: 0.08 X10(3) UL (ref 0–0.2)
BASOPHILS NFR BLD AUTO: 0.8 %
BILIRUB SERPL-MCNC: 0.6 MG/DL (ref 0.2–1.1)
BUN BLD-MCNC: 19 MG/DL (ref 9–23)
CALCIUM BLD-MCNC: 9.7 MG/DL (ref 8.7–10.4)
CANCER AG125 SERPL-ACNC: 11 U/ML (ref ?–30.2)
CHLORIDE SERPL-SCNC: 108 MMOL/L (ref 98–112)
CO2 SERPL-SCNC: 27 MMOL/L (ref 21–32)
CREAT BLD-MCNC: 0.84 MG/DL
EGFRCR SERPLBLD CKD-EPI 2021: 73 ML/MIN/1.73M2 (ref 60–?)
EOSINOPHIL # BLD AUTO: 0.16 X10(3) UL (ref 0–0.7)
EOSINOPHIL NFR BLD AUTO: 1.7 %
ERYTHROCYTE [DISTWIDTH] IN BLOOD BY AUTOMATED COUNT: 13.2 %
GLOBULIN PLAS-MCNC: 2.7 G/DL (ref 2–3.5)
GLUCOSE BLD-MCNC: 115 MG/DL (ref 70–99)
HCT VFR BLD AUTO: 39.4 %
HGB BLD-MCNC: 12.7 G/DL
IMM GRANULOCYTES # BLD AUTO: 0.05 X10(3) UL (ref 0–1)
IMM GRANULOCYTES NFR BLD: 0.5 %
LYMPHOCYTES # BLD AUTO: 2.4 X10(3) UL (ref 1–4)
LYMPHOCYTES NFR BLD AUTO: 25.1 %
MCH RBC QN AUTO: 27.7 PG (ref 26–34)
MCHC RBC AUTO-ENTMCNC: 32.2 G/DL (ref 31–37)
MCV RBC AUTO: 86 FL
MONOCYTES # BLD AUTO: 0.57 X10(3) UL (ref 0.1–1)
MONOCYTES NFR BLD AUTO: 5.9 %
NEUTROPHILS # BLD AUTO: 6.32 X10 (3) UL (ref 1.5–7.7)
NEUTROPHILS # BLD AUTO: 6.32 X10(3) UL (ref 1.5–7.7)
NEUTROPHILS NFR BLD AUTO: 66 %
OSMOLALITY SERPL CALC.SUM OF ELEC: 293 MOSM/KG (ref 275–295)
PLATELET # BLD AUTO: 251 10(3)UL (ref 150–450)
POTASSIUM SERPL-SCNC: 3.9 MMOL/L (ref 3.5–5.1)
PROT SERPL-MCNC: 7.5 G/DL (ref 5.7–8.2)
RBC # BLD AUTO: 4.58 X10(6)UL
SODIUM SERPL-SCNC: 140 MMOL/L (ref 136–145)
WBC # BLD AUTO: 9.6 X10(3) UL (ref 4–11)

## 2024-10-22 PROCEDURE — 99215 OFFICE O/P EST HI 40 MIN: CPT | Performed by: INTERNAL MEDICINE

## 2024-10-22 PROCEDURE — G2211 COMPLEX E/M VISIT ADD ON: HCPCS | Performed by: INTERNAL MEDICINE

## 2024-10-22 NOTE — PROGRESS NOTES
Patient here for follow-up. Continues on letrozole. Has a follow-up appointment with surgical oncology later this month. Still having serous drainage to wound site.

## 2024-10-29 PROBLEM — L02.214 SOFT TISSUE ABSCESS OF INGUINAL REGION: Status: ACTIVE | Noted: 2024-10-29

## 2024-10-29 NOTE — H&P
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)    History of pulmonary embolism    Internal hemorrhoids    Peritoneal carcinomatosis (HCC)    Osteopenia    Encounter for preoperative screening laboratory testing for COVID-19 virus    H/O total thyroidectomy    Postoperative hypothyroidism    Iron deficiency anemia    Primary hypertension    Status post total replacement of left hip    History of DVT (deep vein thrombosis)    Class 2 severe obesity with serious comorbidity and body mass index (BMI) of 35.0 to 35.9 in adult (HCC)    Hypertrophic granulation tissue    Wound disruption    Soft tissue abscess of inguinal region        History of Present Illness:  Patient is a 74 year old woman who is known to our practice for history of metastatic endometrioid carcinoma, detailed below. We are currently seeing her for right inguinal abscess with draining sinus tract.     Metastatic endometrioid carcinoma to abdominal wall.  -Hysterectomy with bilateral salpingo-oophorectomies in 1999.     7/7/2021: Patient consulted with me with soft tissue tumor of the right lower abdominal wall.  Core needle biopsy performed which showed atypical endometrioid proliferation suspicious for endometrioid adenocarcinoma which was ER positive and negative for GATA3.   was 93.6.  CT chest 7/13/2021 revealed short segment PE involving the  lobar and segmental branch of the RUL. Bilateral dopplers from 7/13/21 negative along with an enlarged thyroid gland. Bilateral dopplers negative. For her PE, she then started on Lovenox.     8/10/2021: Radical resection of right lower quadrant abdominal wall and proximal right thigh metastatic carcinoma.     -Initiated letrozole therapy  -4/6/2022 CT chest, abdomen, and pelvis:within the left lower quadrant anteriorly is a 2.6 x 2.9 versus 2.4 x 2.8 cm nodule.      5/17/2022: Cytoreductive surgery to include resection of abdominal tumor with en bloc partial resection of left lower abdominal wall, omentectomy, and hyperthermic intraperitoneal chemotherapy with cisplatin (100 mg/sq m).    1/20/2023: Total thyroidectomy by Dr Walsh --> hyperplastic nodules    8/24/2023: Left posterior cemented total hip arthroplasty    3/14/2024: CT C/A/P with right inguinal soft tissue thickening, stable    5/22/2024: Seen by Dr Tony Stark for right groin boil/granulation tissue where prior surgical drain had been. Sharp debridement and chemical cauterization performed in office and recommended wound care.     7/31/2024: Patient seen by PCP for wound drainage. Started on ABX. Referred to wound care.     8/22/2024: Debridement by wound care noted possible residual suture. No malignancy on path. TX with gentamicin drops.     9/12/2024: FU with wound care, recommended for Dr Strickland to [facility]    9/30/2024: CT C/A/P noting the complex collection within the right inguinal subcutaneous tissues appears increased in size with complex fluid and air extending to the level of the skin and may represent draining sinus tract         Vital Signs:  /68 (BP Location: Right arm, Patient Position: Sitting, Cuff Size: adult)   Pulse 99   Resp 20   Wt 85.9 kg (189 lb 6.4 oz)   SpO2 97%   BMI 32.49 kg/m²      Medications Reviewed:    Current Outpatient Medications:     AMLODIPINE 5 MG Oral Tab, TAKE 1 TABLET(5 MG) BY MOUTH DAILY, Disp: 90  tablet, Rfl: 0    LETROZOLE 2.5 MG Oral Tab, TAKE 1 TABLET(2.5 MG) BY MOUTH DAILY, Disp: 90 tablet, Rfl: 0    gentamicin 0.1 % External Ointment, Apply 1 Application topically 3 (three) times daily., Disp: 45 g, Rfl: 0    levothyroxine 100 MCG Oral Tab, Take 1 tablet (100 mcg total) by mouth daily., Disp: 90 tablet, Rfl: 0    calcium carbonate 500 MG Oral Chew Tab, Chew 1 tablet (500 mg total) by mouth daily as needed for Heartburn., Disp: , Rfl:     NON FORMULARY, Take 1 capsule by mouth daily. EYE Promise supplement, Disp: , Rfl:      Allergies Reviewed:  Allergies[1]     History:  Reviewed:  Past Medical History:    Cancer (HCC)    abdominal tumor/ uterine cancer 1999, abdominal wall 2021    High blood pressure    Hypothyroidism    post surgical    Osteoarthritis    bilateral knees    Personal history of antineoplastic chemotherapy    \"chemo wash\" with surgery    PONV (postoperative nausea and vomiting)    post colonoscopy    Prediabetes    Pulmonary embolism (HCC)    7/2021-getting filter put in 8/5/21    Visual impairment    contacts and glasses    Wears glasses      Reviewed:  Past Surgical History:   Procedure Laterality Date    Appendectomy      Appendectomy      Colonoscopy      Finger splint Left     pinky finger    Hysterectomy      total    Other  08/10/2021    Radical resection of right lower abdominal wall and proximal right thigh metastatic carcinoma with sartorius fascia flap transposition and  repair of resultant defect with mesh. Use of SPY to evaluate tissue perfusion.    Other  05/17/2022    Cytoreductive surgery to include resection of left abdominal wall tumor and omentectomy. Hyperthermic intraperitoneal chemotherapy with Cisplatin (100 mg/m2)    Other surgical history  01/2023    total thyriodectomy    Perc placement of ivc filter  08/05/2021    Total abdom hysterectomy  1999    with BSO    Wrist fracture surgery Right 12/2021      Reviewed Social History:  Social History     Socioeconomic  History    Marital status: Single   Tobacco Use    Smoking status: Never    Smokeless tobacco: Never    Tobacco comments:     Updated 10/7/24   Vaping Use    Vaping status: Never Used   Substance and Sexual Activity    Alcohol use: Yes     Alcohol/week: 1.0 standard drink of alcohol     Types: 1 Cans of beer per week     Comment: social- few drinks per month    Drug use: Never   Other Topics Concern    Caffeine Concern No    Exercise No      Reviewed:  Family History   Problem Relation Age of Onset    Cancer Father         lung CA      Review of Systems:  Patient reports no rapid or irregular heartbeat. She reports no chest pain. She reports no nausea. She reports no vomiting. She reports no diarrhea. She reports no constipation. She reports no bright red blood per rectum. She reports no fatigue. She reports no blood in the urine hematuria, no changes in urinary habits: initiating urination requires straining, no changes in urinary habits: no hesitancy, and no change in urine appearance. She reports no headache. She reports no dizziness. She reports no easy bruising tendency. She reports no diffuse joint pains. She reports no bone pain. She reports no back pain. She reports no swollen glands. She reports no pain. She reports no numbness. She reports no shortness of breath. She reports no change in a mole. She reports no recent change in weight, no fever, no chills, and no sweating heavily at night.       Physical Examination:  Constitutional: NAD.   Eyes: Sclera: non-icteric.    Lymph Nodes: Lymph Nodes no cervical LAD, supraclavicular LAD, axillary LAD, or inguinal LAD.   Lungs: Normal respiratory effort.  Cardiovascular: Well-perfused.  Abdomen: Soft, no masses.  Sinus tract to right lower/suprapubic region tracking medially without overlying erythema.  Musculoskeletal: Extremities: no edema.   Skin: Inspection and palpation: no jaundice.      Document Review:  9/30/2024 CT C/A/P:  CONCLUSION:    1. No evidence to  suggest metastatic disease.   2. The previously noted complex collection within the right inguinal subcutaneous tissues appears increased in size and currently is noted to have internal complex fluid and air.  This appears to extend to the level of the skin and may represent a   draining sinus tract.  Please correlate clinically.  The possibly of an infected collection/abscess cannot be excluded.   3. Cholelithiasis without acute cholecystitis or biliary ductal dilatation.     Final Diagnosis:   Abdominal tissue:  -Edematous soft tissue with abundant lipid-laden macrophages and multinucleated cells, with background acute and chronic inflammatory cells.  -No evidence of malignancy in the submitted material.  -See comment.          Procedure(s):  None     Assessment / Plan:  Right lower abdomen/suprapubic fistulous tract  HX of Metastatic Endometrioid Carcinoma  Findings were discussed with patient.  Today, she will be meeting with Dr. Dietz from plastic surgery for consideration of surgical management.  We will support Dr. Dietz as requested.  Surgical and nonsurgical options discussed with patient.  Patient verbalized understanding.  She is awaiting Dr. Dietz recommendations.    Obesity  Influencing surgical management.            Electronically Signed by: Trever Strickland MD           [1]   Allergies  Allergen Reactions    Adhesive Tape RASH    Hydrofera Blue 4\"X4\" [Wound Dressings] RASH

## 2024-10-30 ENCOUNTER — OFFICE VISIT (OUTPATIENT)
Dept: SURGERY | Facility: CLINIC | Age: 74
End: 2024-10-30
Payer: MEDICARE

## 2024-10-30 VITALS
DIASTOLIC BLOOD PRESSURE: 68 MMHG | WEIGHT: 189.38 LBS | BODY MASS INDEX: 32 KG/M2 | HEART RATE: 99 BPM | RESPIRATION RATE: 20 BRPM | OXYGEN SATURATION: 97 % | SYSTOLIC BLOOD PRESSURE: 145 MMHG

## 2024-10-30 DIAGNOSIS — S31.109A OPEN WOUND OF ABDOMEN, INITIAL ENCOUNTER: Primary | ICD-10-CM

## 2024-10-30 DIAGNOSIS — C78.6 PERITONEAL CARCINOMATOSIS (HCC): Primary | ICD-10-CM

## 2024-10-30 DIAGNOSIS — L02.214 SOFT TISSUE ABSCESS OF INGUINAL REGION: ICD-10-CM

## 2024-10-30 PROCEDURE — 99203 OFFICE O/P NEW LOW 30 MIN: CPT | Performed by: SURGERY

## 2024-10-30 PROCEDURE — 99213 OFFICE O/P EST LOW 20 MIN: CPT | Performed by: SURGERY

## 2024-10-30 NOTE — CONSULTS
New Patient Consultation    Chief Complaint: open wound right groin     History of Present Illness:   Viola Lindsay is a 74 year old female referred by Dr. Strickland for evaluation of an open wound of the right lower abdomen/suprapubic area. She has a history of metastatic endometrioid carcinoma. The patient underwent a radical resection of right lower quadrant abdominal wall and proximal right thigh metastatic carcinoma on 8/10/2021. She also underwent Cytoreductive surgery to include resection of abdominal tumor with en bloc partial resection of left lower abdominal wall, omentectomy, and hyperthermic intraperitoneal chemotherapy with cisplatin (100 mg/sq m) on 5/17/2022.     She had a persistent open wound on the right lower abdomen and was seen by Dr. Stark for right groin boil/granulation tissue where a prior surgical drain had been. Dr. Stark performed a sharp debridement and chemical cauterization in the office and gave the patient wound care instructions. She continued to experience wound drainage from the right groin area. On 7/31/2024, she saw her PCP for wound drainage. She was started on antibiotics and referred to wound care. On 8/22/2024, she saw wound care where debridement was performed and sent to pathology. No malignancy was noted on path.  A CT chest/abdomen/pelvis was completed on 9/30/2024. She is here today to discuss treatment options.    CT C/A/P from 9/30/2024:  Impression   CONCLUSION:    1. No evidence to suggest metastatic disease.  2. The previously noted complex collection within the right inguinal subcutaneous tissues appears increased in size and currently is noted to have internal complex fluid and air.  This appears to extend to the level of the skin and may represent a  draining sinus tract.  Please correlate clinically.  The possibly of an infected collection/abscess cannot be excluded.  3. Cholelithiasis without acute cholecystitis or biliary ductal dilatation.       Past  Medical History:      Past Medical History:    Cancer (HCC)    abdominal tumor/ uterine cancer 1999, abdominal wall 2021    High blood pressure    Hypothyroidism    post surgical    Osteoarthritis    bilateral knees    Personal history of antineoplastic chemotherapy    \"chemo wash\" with surgery    PONV (postoperative nausea and vomiting)    post colonoscopy    Prediabetes    Pulmonary embolism (HCC)    7/2021-getting filter put in 8/5/21    Visual impairment    contacts and glasses    Wears glasses         Past Surgical History:  Past Surgical History:   Procedure Laterality Date    Appendectomy      Appendectomy      Colonoscopy      Finger splint Left     pinky finger    Hysterectomy      total    Other  08/10/2021    Radical resection of right lower abdominal wall and proximal right thigh metastatic carcinoma with sartorius fascia flap transposition and  repair of resultant defect with mesh. Use of SPY to evaluate tissue perfusion.    Other  05/17/2022    Cytoreductive surgery to include resection of left abdominal wall tumor and omentectomy. Hyperthermic intraperitoneal chemotherapy with Cisplatin (100 mg/m2)    Other surgical history  01/2023    total thyriodectomy    Perc placement of ivc filter  08/05/2021    Total abdom hysterectomy  1999    with BSO    Wrist fracture surgery Right 12/2021         Medications:    No outpatient medications have been marked as taking for the 10/30/24 encounter (Office Visit) with Zainab Dietz MD.         Allergies:    Allergies[1]      Family History:   Family History   Problem Relation Age of Onset    Cancer Father         lung CA         Social History:  History   Alcohol Use    1.0 standard drink of alcohol/week    1 Cans of beer per week     Comment: social- few drinks per month       History   Smoking Status    Never   Smokeless Tobacco    Never       History   Drug Use Unknown       Review of Systems:    A 13 point review of systems was performed on the intake  sheet.  The patient reports   General:   The patient denies, fever, chills, night sweats, fatigue, generalized weakness, change in appetite, weight loss, or weight gain.  Endocrine:   There is no history of poor/slow wound healing, weight loss/gain, fertility or hormone problems, cold intolerance, heat intolerance, excessive thirst, or thyroid disease.   Allergic/Immunologic:  There is no history of hives, hay fever, angioedema or anaphylaxis.  HEENT:    The patient denies ear pain, ear drainage, hearing loss, change in vision, double vision, cataracts, glaucoma, nasal congestion, nosebleed, hoarseness, sore throat, or swollen glands  Respiratory:   The patient denies shortness of breath, cough, bloody cough, phlegm, asthma, or wheezing  Cardiovascular:  The patient denies chest pain/pressure, palpitations, irregular heartbeat, high blood pressure, stroke, or leg swelling  Breasts:  Patient denies breast masses, pain, change in the breast skin, skin dimpling, nipple discharge, or rash  Gastrointestinal:   There is no history of difficulty or pain with swallowing, reflux symptoms, nausea, vomiting, dark/ bloody stools, diarrhea, constipation,  change in bowel habits, or abdominal pain.   Genitourinary:  The patient denies frequent urination, needing to get up at night to urinate, urinary hesitancy or retaining urine, painful urination, urinary incontinence, decreased urine stream, blood in the urine, or vaginal/penile discharge.  Skin:   The patient denies rash, itching, skin lesions, dry skin, change in skin color or change in moles, sunburns, or sunburns with blistering.   Hematologic/Lymphatic:  The patient denies easily bruising or bleeding, persistent swollen glands or lymph nodes, bleeding disorders, blood clots, or pulmonary embolism.    Gynecologic:  The patient denies irregular menses, pelvic pain, pain with intercourse, painful menses, or pregnancy  Musculoskeletal:  The patient denies muscle aches/pain,  joint pain, stiff joints, neck pain, back pain or bone pain.  Neurologic:  There is no history of migraines or severe headaches, seizure/epilepsy, speech problems, coordination problems, trembling/tremors, fainting/black outs, dizziness, memory problems, loss of sensation/numbness, problems walking, weakness, tingling or burning in hands/feet.   Psychiatric:  There is no history of abusive relationship, bipolar disorder, sleep disturbance, anxiety, depression or feeling of despair.    Physical Exam:    There were no vitals taken for this visit.    Constitutional: The patient is an alert, oriented and well-developed.     Neurologic: Speech patterns and movements are normal.     Psychiatric: Affect is appropriate.    Eyes: Conjunctiva are clear, non-icteric. PERRL    ENT: no obvious abnormality, no ear drainage, mucous membranes moist and pink    Integument/Skin: see GI exam    Respiratory: Normal respiratory effort.     Cardiovascular: no cyanosis, no edema    Gastrointestinal: open wound right lower abdomen/suprapubic area 0.5 cm diameter with hyper granulation tissue at wound base,1.3 cm tunneling toward the medial aspect; no surrounding erythema; no malodor; multiple scars s/p previous abdominal surgeries    Musculoskeletal: Extremities unremarkable, without edema, tenderness or deformities    Impression:   Viola Lindsay  is a 74 year old with open wound right lower abdomen/suprapubic area with tunneling    Discussion and Plan:  The patient was counseled on the different treatment options.     We discussed the option for surgical management versus continued conservative management with the wound care clinic and home care. The patient would like to continue conservative treatment. Silver nitrate was applied to the wound and tunnel today. She will follow up after her next CT scan for wound check or sooner if she has any concerns or decides that she would like to proceed with surgical management.     45 minutes  were spent with the patient, from which 35 minutes were spent counseling the patient and coordinating care.         [1]   Allergies  Allergen Reactions    Adhesive Tape RASH    Hydrofera Blue 4\"X4\" [Wound Dressings] RASH

## 2024-11-18 NOTE — TELEPHONE ENCOUNTER
Protocol FAIL    Requesting: levothyroxine 100 MCG Oral Tab     LOV: 7/31/24  RTC: 6 MONTHS  Filled: 8/21/24 90 TABLET 0 REFILL  Recent Labs: 7/29/24    Upcoming OV   No future appointments.

## 2024-11-19 RX ORDER — LEVOTHYROXINE SODIUM 100 UG/1
100 TABLET ORAL DAILY
Qty: 90 TABLET | Refills: 0 | Status: SHIPPED | OUTPATIENT
Start: 2024-11-19

## 2024-12-02 RX ORDER — LETROZOLE 2.5 MG/1
2.5 TABLET, FILM COATED ORAL DAILY
Qty: 90 TABLET | Refills: 0 | Status: SHIPPED | OUTPATIENT
Start: 2024-12-02

## 2025-01-14 DIAGNOSIS — I10 PRIMARY HYPERTENSION: ICD-10-CM

## 2025-01-15 RX ORDER — AMLODIPINE BESYLATE 5 MG/1
5 TABLET ORAL DAILY
Qty: 90 TABLET | Refills: 0 | Status: SHIPPED | OUTPATIENT
Start: 2025-01-15

## 2025-01-15 NOTE — TELEPHONE ENCOUNTER
Protocol FAIL    Requesting: AMLODIPINE 5 MG Oral Tab     LOV: 7/31/24  RTC: 6 MONTHS  Filled: 10/19/24 90 TABLET 0 REFILL  Recent Labs: 7/29/24    Upcoming OV   No future appointments.

## 2025-02-17 ENCOUNTER — LAB ENCOUNTER (OUTPATIENT)
Dept: LAB | Age: 75
End: 2025-02-17
Attending: INTERNAL MEDICINE
Payer: MEDICARE

## 2025-02-17 DIAGNOSIS — E89.0 POSTOPERATIVE HYPOTHYROIDISM: ICD-10-CM

## 2025-02-17 LAB — TSI SER-ACNC: 4.57 UIU/ML (ref 0.55–4.78)

## 2025-02-17 PROCEDURE — 36415 COLL VENOUS BLD VENIPUNCTURE: CPT

## 2025-02-17 PROCEDURE — 84443 ASSAY THYROID STIM HORMONE: CPT

## 2025-02-17 RX ORDER — LEVOTHYROXINE SODIUM 100 UG/1
100 TABLET ORAL DAILY
Qty: 90 TABLET | Refills: 0 | Status: SHIPPED | OUTPATIENT
Start: 2025-02-17

## 2025-02-17 NOTE — TELEPHONE ENCOUNTER
MCM sent   Due for repeat labs     Protocol failed     LOV: 7/31/24   RTC: 6 months  Filled: 11/19/24 #90   Labs: 7/29/24   No future appointments.

## 2025-03-03 RX ORDER — LETROZOLE 2.5 MG/1
2.5 TABLET, FILM COATED ORAL DAILY
Qty: 90 TABLET | Refills: 0 | Status: SHIPPED | OUTPATIENT
Start: 2025-03-03

## 2025-03-12 ENCOUNTER — TELEPHONE (OUTPATIENT)
Facility: CLINIC | Age: 75
End: 2025-03-12

## 2025-03-12 ENCOUNTER — OFFICE VISIT (OUTPATIENT)
Dept: ORTHOPEDICS CLINIC | Facility: CLINIC | Age: 75
End: 2025-03-12
Payer: MEDICARE

## 2025-03-12 DIAGNOSIS — M17.12 PRIMARY OSTEOARTHRITIS OF LEFT KNEE: Primary | ICD-10-CM

## 2025-03-12 RX ORDER — TRIAMCINOLONE ACETONIDE 40 MG/ML
40 INJECTION, SUSPENSION INTRA-ARTICULAR; INTRAMUSCULAR ONCE
Status: COMPLETED | OUTPATIENT
Start: 2025-03-12 | End: 2025-03-14

## 2025-03-12 NOTE — TELEPHONE ENCOUNTER
Appt scheduled for Left knee cortizone injection   Future Appointments   Date Time Provider Department Center   3/12/2025  4:20 PM Kevin Johnson PA-C [facility]

## 2025-03-12 NOTE — PROCEDURES
Risks and benefits of knee injection discussed with the patient, with risks including but not limited to pain and swelling at the injection site and/or within the knee joint, infection, elevation in blood pressure and/or glucose levels, facial flushing. After informed consent, the patient's left knee was marked, locally anesthetized with skin refrigerant, prepped with topical antiseptic, and injected with a mixture of 1mL 40mg/mL Kenalog, 2mL 1% lidocaine and 2mL 0.5% marcaine through the inferolateral portal.  A band-aid was applied.  The patient tolerated the procedure well.    Kevin Johnson PA-C  [facility]

## 2025-03-14 RX ADMIN — TRIAMCINOLONE ACETONIDE 40 MG: 40 INJECTION, SUSPENSION INTRA-ARTICULAR; INTRAMUSCULAR at 16:26:00

## 2025-03-17 RX ORDER — LETROZOLE 2.5 MG/1
2.5 TABLET, FILM COATED ORAL DAILY
Qty: 90 TABLET | Refills: 0 | OUTPATIENT
Start: 2025-03-17

## 2025-04-11 DIAGNOSIS — I10 PRIMARY HYPERTENSION: ICD-10-CM

## 2025-04-11 NOTE — TELEPHONE ENCOUNTER
Protocol passed    Requesting amlodipine   LOV: 07/31/24  RTC: 6 months  Last Relevant Labs: 10/22/24  Filled: 01/15/25 #90 with 0 refills    Future Appointments   Date Time Provider Department Center   4/29/2025 11:15 AM [facility]

## 2025-04-13 RX ORDER — AMLODIPINE BESYLATE 5 MG/1
5 TABLET ORAL DAILY
Qty: 90 TABLET | Refills: 0 | Status: SHIPPED | OUTPATIENT
Start: 2025-04-13 | End: 2025-07-10

## 2025-04-14 NOTE — TELEPHONE ENCOUNTER
Pt declined scheduling at the moment. Says she will call back, but did want me to make you aware that she will be having a CT scan done.    Future Appointments   Date Time Provider Department Center   4/29/2025 11:15 AM [facility]

## 2025-04-29 ENCOUNTER — HOSPITAL ENCOUNTER (OUTPATIENT)
Dept: CT IMAGING | Age: 75
Discharge: HOME | End: 2025-04-29
Attending: INTERNAL MEDICINE
Payer: MEDICARE

## 2025-04-29 DIAGNOSIS — D50.9 IRON DEFICIENCY ANEMIA, UNSPECIFIED IRON DEFICIENCY ANEMIA TYPE: ICD-10-CM

## 2025-04-29 DIAGNOSIS — Z79.811 LONG TERM (CURRENT) USE OF AROMATASE INHIBITORS: ICD-10-CM

## 2025-04-29 DIAGNOSIS — C55 METASTASIS FROM MALIGNANT NEOPLASM OF UTERUS (HCC): ICD-10-CM

## 2025-04-29 DIAGNOSIS — R97.1 ELEVATED CANCER ANTIGEN 125 (CA 125): ICD-10-CM

## 2025-04-29 DIAGNOSIS — C79.9 METASTASIS FROM MALIGNANT NEOPLASM OF UTERUS (HCC): ICD-10-CM

## 2025-04-29 LAB
CREAT BLD-MCNC: 1 MG/DL (ref 0.55–1.02)
EGFRCR SERPLBLD CKD-EPI 2021: 59 ML/MIN/1.73M2 (ref 60–?)

## 2025-04-29 PROCEDURE — 71260 CT THORAX DX C+: CPT | Performed by: INTERNAL MEDICINE

## 2025-04-29 PROCEDURE — 82565 ASSAY OF CREATININE: CPT

## 2025-04-29 PROCEDURE — 74177 CT ABD & PELVIS W/CONTRAST: CPT | Performed by: INTERNAL MEDICINE

## 2025-05-06 NOTE — PROGRESS NOTES
[facility] Note    Visit Diagnosis:  1. 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 Elevated cancer antigen 125 ()          History of Present Illness:   74F here for follow up for a RUL PE (dx 7/13/21) suspected to be 2/2 malignancy. She also has a recurrent endometrial adenocarcinoma, endometrioid type, Grade 1 which was ER+. She is now on letrozole since 09/10/21 She had a repeat excision of a LLQ abdominal wall tumor on 5/17/22. She restarted letrozole afterwards.      Hematology/Oncology History:   -She notes having a total abdominal hysterectomy about 25 years ago.    -6/30/21: CT AP showed a greater than 12 cm mass with areas of cystic and necrotic change.    -7/7/21, she underwent a biopsy with Dr. Strickland which showed atypical endometrioid proliferation suspicious for endometrioid adenocarcinoma which was ER positive and negative for GATA3.  93.6.    -7/13/21: staging CT chest showed a short segment PE involving the lobar and segmental branch of the RUL. Bilateral dopplers from 7/13/21 negative along with an enlarged thyroid gland. Bilateral dopplers negative. For her PE, she was started on Lovenox.    -8/10/21: RLQ Radical Abdominal soft tissue resection with Dr. Strickland on 8/10/21. Endometrial adenocarcinoma, endometrioid type, 13.5 cm, Grade 1, negative margins, 0/2 LN.     -9/10/21: Started on Letrozole     -4/6/22: CT CAP: RUL 3 mm nodule no well appreciated anymore. PE resolved. Interval decrease in R pelvic abscess. R Pelvic node smaller. There is LLQ soft tissue density that is bigger (2.6 x 2.9 from 2.4 x 2.8 cm). Case was discussed at tumor board, plan for surgical resection.    -5/17/22: LLQ abdominal wall excision: FIGO grade 1 endometrial carcinoma, endometrioid type. Measures 5 x 2.7 x 2.5 cm. Negative margins. Omental biopsy negative.     -05/24/23: CT CAP: 1. There is no definite evidence of metastatic disease within the chest, abdomen, or  pelvis.  2. Soft tissue density within the subcutaneous tissues of the right inguinal region is in area of previous cystic fluid collection likely representing post surgical changes with granulation tissue. Possibility of local recurrence within the abdominal wall is not completely excluded and continued close interval follow-up is recommended in approximately 6 months.    -03/14/24: CT CAP: infiltration of fat adjacent to sigmoid colon is non specific. Right ventral inguinal soft tissue thickening is again noted and stable.     -09/30/24: CT CAP-No evidence of metastatic disease. Noted to have a fluid collection in R inguinal subcutaneous tissues-may represent a draining sinus tract.     -CT CAP 04/2025: No evidence of recurrence     Interval History:   -CT reviewed.  -Tolerating letrozole  -Energy is good. No new joint or muscle aches   -Having regular bowel movements     Review of Systems: 12 Point ROS was completed and pertinent positives are in the HPI    Current Outpatient Medications on File Prior to Visit   Medication Sig Dispense Refill    AMLODIPINE 5 MG Oral Tab TAKE 1 TABLET(5 MG) BY MOUTH DAILY 90 tablet 0    letrozole 2.5 MG Oral Tab Take 1 tablet (2.5 mg total) by mouth daily. 90 tablet 0    LEVOTHYROXINE 100 MCG Oral Tab TAKE 1 TABLET(100 MCG) BY MOUTH DAILY 90 tablet 0    calcium carbonate 500 MG Oral Chew Tab Chew 1 tablet (500 mg total) by mouth daily as needed for Heartburn.      NON FORMULARY Take 1 capsule by mouth in the morning. E[employer] Promise supplement.      gentamicin 0.1 % External Ointment Apply 1 Application topically 3 (three) times daily. (Patient not taking: Reported on 5/7/2025) 45 g 0     Current Facility-Administered Medications on File Prior to Visit   Medication Dose Route Frequency Provider Last Rate Last Admin    [COMPLETED] iopamidol 76% (ISOVUE-370) injection for power injector  90 mL Intravenous ONCE PRN Debi Zamora MD   90 mL at 04/29/25 5296     Past Medical History:     Cancer (HCC)    abdominal tumor/ uterine cancer 1999, abdominal wall 2021    High blood pressure    Hypothyroidism    post surgical    Osteoarthritis    bilateral knees    Personal history of antineoplastic chemotherapy    \"chemo wash\" with surgery    PONV (postoperative nausea and vomiting)    post colonoscopy    Prediabetes    Pulmonary embolism (HCC)    7/2021-getting filter put in 8/5/21    Visual impairment    contacts and glasses    Wears glasses     Past Surgical History:   Procedure Laterality Date    Appendectomy      Appendectomy      Colonoscopy      Finger splint Left     pinky finger    Hysterectomy      total    Other  08/10/2021    Radical resection of right lower abdominal wall and proximal right thigh metastatic carcinoma with sartorius fascia flap transposition and  repair of resultant defect with mesh. Use of SPY to evaluate tissue perfusion.    Other  05/17/2022    Cytoreductive surgery to include resection of left abdominal wall tumor and omentectomy. Hyperthermic intraperitoneal chemotherapy with Cisplatin (100 mg/m2)    Other surgical history  01/2023    total thyriodectomy    Perc placement of ivc filter  08/05/2021    Total abdom hysterectomy  1999    with BSO    Wrist fracture surgery Right 12/2021     Social History     Socioeconomic History    Marital status: Single   Tobacco Use    Smoking status: Never    Smokeless tobacco: Never    Tobacco comments:     Updated 10/7/24   Vaping Use    Vaping status: Never Used   Substance and Sexual Activity    Alcohol use: Yes     Alcohol/week: 1.0 standard drink of alcohol     Types: 1 Cans of beer per week     Comment: social- few drinks per month    Drug use: Never      Family History   Problem Relation Age of Onset    Cancer Father         lung CA       Physical Exam  Height: --  Weight: 89.4 kg (197 lb) (05/07 0914)  BSA (Calculated - sq m): --  Pulse: 94 (05/07 0914)  BP: 146/71 (05/07 0914)  Temp: 98.2 °F (36.8 °C) (05/07 0914)  Do Not Use -  Resp Rate: --  SpO2: 95 % (05/07 0914)     General: NAD, AOX3  HEENT: clear op, mmm, no jvd, no scleral icterus  CV: RRR S1S2 no murmurs  Extremities: No edema  Lungs: CTAB, no increased work of breathing  Abd: soft nt nd +BS, lower quadrant abdominal nodularity stable.  Surgical scars.   Neuro: CN: II-XII grossly intact      Results:  Lab Results   Component Value Date    WBC 8.2 05/07/2025    HGB 12.6 05/07/2025    HCT 38.0 05/07/2025    MCV 85.2 05/07/2025    .0 05/07/2025     Lab Results   Component Value Date     10/22/2024    K 3.9 10/22/2024    CO2 27.0 10/22/2024     10/22/2024    BUN 19 10/22/2024    PHOS 3.1 05/18/2022    ALB 4.8 10/22/2024       No results found for: \"LDH\"    Radiology: reviewed     Pathology:   7/7/21: Abdominal Wall Bx      Final Diagnosis:   Abdominal wall mass, biopsy:  -Atypical endometrioid proliferation.  -See comment.      Electronically signed by Inocencio Renee MD on 7/8/2021 at 1556       Final Diagnosis Comment      Sections show cores of fibroadipose tissue tissue with hemosiderin deposition and small areas of atypical glandular proliferation with focal squamous morules.   Immunostains for estrogen receptor (ER) and GATA3 were performed.  The lesional cells are positive for ER and negative for GATA3.     The histologic and immunohistochemical findings are consistent with an atypical endometrioid proliferation,  suspicious for endometrioid adenocarcinoma.  This could be arising in a background of endometriosis (favored) or could represent a metastasis.  Correlation with the clinical findings is recommended.        Assessment and Plan:  74F with a PMH of hearing impairment presented to the ER on 7/13/21 with a PE.    Endometrioid adenocarcinoma: Grade 1 ER positive and GATA3 negative  -She reports a history of a total abdominal hysterectomy about 25 years ago with a possible malignancy.  -She noticed a right lower quadrant mass in June 2021 after 65 pounds of  intentional weight loss.  It is unclear on how long this mass has been present.  -S/P resection of RLQ abdominal wall mass on 8/10/21 with Dr. Strickland. Mass was 13.5 cm, negative margins and Grade 1.   -Given that she had a distant recurrence years after her initial diagnosis, we discussed that she does have risk of future recurrence, however, this risk is difficult to predict. Given that she has ER+ disease, I recommended adjuvant aromatase inhibitor (I.e. letrozole 2.5 mg daily)   -We will likely continue her letrozole indefinitely given that she had a wall abdominal wall is likely metastatic from her uterus.  -Letrozole 2.5 mg daily started on 9/10/21.   -Repeat imaging after letrozole started showed slight enlargement on LLQ nodule. This nodule appears to have enlarged by only a few mm. In retrospect, it appears to have been present on old films. We discussed the case in tumor board and recommend surgical excision.  This was excised on 5/17/2022.  This had the same pathology as her initial sample.  She restarted letrozole afterwards  -Most recent imaging with stable findings.   -CBC, CMP,  q6 months  -Can space imaging to q12 months-next 04/2026     RUL PE: Dx 7/13/21  -Likely provoked by BMI and active malignancy   -Dopplers negative   -Baseline D-dimer 3.87   -S/P temporary IVC filter which was removed 9/23/21   -Completed 3 months of anticoagulation (Xarelto) around 10/17/21  -Avoid estrogen containing agents, tamoxifen given VTE risk  -Post op DVT ppx     CBC reviewed: Anemia resolved. WBC normal.     Osteopenia: Repeat in 12/2022. On Calcium/Vitamin D. Following with PCP.     RTC in 6 months     [facility]

## 2025-05-07 ENCOUNTER — OFFICE VISIT (OUTPATIENT)
Age: 75
End: 2025-05-07
Attending: INTERNAL MEDICINE
Payer: MEDICARE

## 2025-05-07 VITALS
BODY MASS INDEX: 34 KG/M2 | DIASTOLIC BLOOD PRESSURE: 71 MMHG | WEIGHT: 197 LBS | HEART RATE: 94 BPM | RESPIRATION RATE: 18 BRPM | OXYGEN SATURATION: 95 % | TEMPERATURE: 98 F | SYSTOLIC BLOOD PRESSURE: 146 MMHG

## 2025-05-07 DIAGNOSIS — Z79.811 LONG TERM (CURRENT) USE OF AROMATASE INHIBITORS: ICD-10-CM

## 2025-05-07 DIAGNOSIS — C79.9 METASTASIS FROM MALIGNANT NEOPLASM OF UTERUS (HCC): ICD-10-CM

## 2025-05-07 DIAGNOSIS — C55 METASTASIS FROM MALIGNANT NEOPLASM OF UTERUS (HCC): ICD-10-CM

## 2025-05-07 LAB
ALBUMIN SERPL-MCNC: 5 G/DL (ref 3.2–4.8)
ALBUMIN/GLOB SERPL: 2 {RATIO} (ref 1–2)
ALP LIVER SERPL-CCNC: 57 U/L (ref 55–142)
ALT SERPL-CCNC: 11 U/L (ref 10–49)
ANION GAP SERPL CALC-SCNC: 8 MMOL/L (ref 0–18)
AST SERPL-CCNC: 16 U/L (ref ?–34)
BASOPHILS # BLD AUTO: 0.08 X10(3) UL (ref 0–0.2)
BASOPHILS NFR BLD AUTO: 1 %
BILIRUB SERPL-MCNC: 0.6 MG/DL (ref 0.2–1.1)
BUN BLD-MCNC: 16 MG/DL (ref 9–23)
CALCIUM BLD-MCNC: 9.8 MG/DL (ref 8.7–10.6)
CANCER AG125 SERPL-ACNC: 11 U/ML (ref ?–30.2)
CHLORIDE SERPL-SCNC: 106 MMOL/L (ref 98–112)
CO2 SERPL-SCNC: 26 MMOL/L (ref 21–32)
CREAT BLD-MCNC: 0.91 MG/DL (ref 0.55–1.02)
EGFRCR SERPLBLD CKD-EPI 2021: 66 ML/MIN/1.73M2 (ref 60–?)
EOSINOPHIL # BLD AUTO: 0.14 X10(3) UL (ref 0–0.7)
EOSINOPHIL NFR BLD AUTO: 1.7 %
ERYTHROCYTE [DISTWIDTH] IN BLOOD BY AUTOMATED COUNT: 13.1 %
GLOBULIN PLAS-MCNC: 2.5 G/DL (ref 2–3.5)
GLUCOSE BLD-MCNC: 125 MG/DL (ref 70–99)
HCT VFR BLD AUTO: 38 % (ref 35–48)
HGB BLD-MCNC: 12.6 G/DL (ref 12–16)
IMM GRANULOCYTES # BLD AUTO: 0.1 X10(3) UL (ref 0–1)
IMM GRANULOCYTES NFR BLD: 1.2 %
LYMPHOCYTES # BLD AUTO: 1.89 X10(3) UL (ref 1–4)
LYMPHOCYTES NFR BLD AUTO: 22.9 %
MCH RBC QN AUTO: 28.3 PG (ref 26–34)
MCHC RBC AUTO-ENTMCNC: 33.2 G/DL (ref 31–37)
MCV RBC AUTO: 85.2 FL (ref 80–100)
MONOCYTES # BLD AUTO: 0.46 X10(3) UL (ref 0.1–1)
MONOCYTES NFR BLD AUTO: 5.6 %
NEUTROPHILS # BLD AUTO: 5.57 X10 (3) UL (ref 1.5–7.7)
NEUTROPHILS # BLD AUTO: 5.57 X10(3) UL (ref 1.5–7.7)
NEUTROPHILS NFR BLD AUTO: 67.6 %
OSMOLALITY SERPL CALC.SUM OF ELEC: 293 MOSM/KG (ref 275–295)
PLATELET # BLD AUTO: 266 10(3)UL (ref 150–450)
POTASSIUM SERPL-SCNC: 3.9 MMOL/L (ref 3.5–5.1)
PROT SERPL-MCNC: 7.5 G/DL (ref 5.7–8.2)
RBC # BLD AUTO: 4.46 X10(6)UL (ref 3.8–5.3)
SODIUM SERPL-SCNC: 140 MMOL/L (ref 136–145)
WBC # BLD AUTO: 8.2 X10(3) UL (ref 4–11)

## 2025-05-07 NOTE — PROGRESS NOTES
Patient here for follow-up. Had a recent CT scan. Continues on letrozole. Denies any updates or changes since last visit.

## 2025-05-17 ENCOUNTER — PATIENT MESSAGE (OUTPATIENT)
Dept: INTERNAL MEDICINE CLINIC | Facility: CLINIC | Age: 75
End: 2025-05-17

## 2025-05-19 RX ORDER — LEVOTHYROXINE SODIUM 100 UG/1
100 TABLET ORAL DAILY
Qty: 90 TABLET | Refills: 0 | OUTPATIENT
Start: 2025-05-19

## 2025-05-19 RX ORDER — LEVOTHYROXINE SODIUM 100 UG/1
100 TABLET ORAL DAILY
Qty: 90 TABLET | Refills: 0 | Status: SHIPPED | OUTPATIENT
Start: 2025-05-19

## 2025-05-19 NOTE — TELEPHONE ENCOUNTER
LOV:  7/31/2025  RTC:  6 months  Filled: 2/2025  Recent Labs:   Component  Ref Range & Units (hover) 2/17/25 12:13 PM   TSH 4.566     Upcoming OV  6/10/2025

## 2025-05-22 RX ORDER — LETROZOLE 2.5 MG/1
2.5 TABLET, FILM COATED ORAL DAILY
Qty: 90 TABLET | Refills: 0 | Status: SHIPPED | OUTPATIENT
Start: 2025-05-22 | End: 2025-08-18

## 2025-06-02 PROBLEM — E66.812 CLASS 2 SEVERE OBESITY WITH SERIOUS COMORBIDITY AND BODY MASS INDEX (BMI) OF 35.0 TO 35.9 IN ADULT (HCC): Status: RESOLVED | Noted: 2024-04-25 | Resolved: 2025-06-02

## 2025-06-02 PROBLEM — C78.6 PERITONEAL CARCINOMATOSIS (HCC): Status: RESOLVED | Noted: 2022-04-13 | Resolved: 2025-06-02

## 2025-06-02 PROBLEM — C79.89: Status: RESOLVED | Noted: 2021-07-28 | Resolved: 2025-06-02

## 2025-06-02 PROBLEM — Z85.9 HISTORY OF CANCER: Status: ACTIVE | Noted: 2025-06-02

## 2025-06-02 PROBLEM — E66.01 CLASS 2 SEVERE OBESITY WITH SERIOUS COMORBIDITY AND BODY MASS INDEX (BMI) OF 35.0 TO 35.9 IN ADULT (HCC): Status: RESOLVED | Noted: 2024-04-25 | Resolved: 2025-06-02

## 2025-06-10 ENCOUNTER — OFFICE VISIT (OUTPATIENT)
Dept: INTERNAL MEDICINE CLINIC | Facility: CLINIC | Age: 75
End: 2025-06-10
Payer: MEDICARE

## 2025-06-10 VITALS
DIASTOLIC BLOOD PRESSURE: 62 MMHG | TEMPERATURE: 98 F | BODY MASS INDEX: 33.97 KG/M2 | OXYGEN SATURATION: 96 % | SYSTOLIC BLOOD PRESSURE: 124 MMHG | WEIGHT: 199 LBS | HEART RATE: 91 BPM | HEIGHT: 64.02 IN | RESPIRATION RATE: 16 BRPM

## 2025-06-10 DIAGNOSIS — E78.2 MIXED HYPERLIPIDEMIA: ICD-10-CM

## 2025-06-10 DIAGNOSIS — R73.03 PREDIABETES: ICD-10-CM

## 2025-06-10 DIAGNOSIS — M85.80 OSTEOPENIA, UNSPECIFIED LOCATION: ICD-10-CM

## 2025-06-10 DIAGNOSIS — D50.8 OTHER IRON DEFICIENCY ANEMIA: ICD-10-CM

## 2025-06-10 DIAGNOSIS — E66.812 CLASS 2 SEVERE OBESITY WITH SERIOUS COMORBIDITY AND BODY MASS INDEX (BMI) OF 35.0 TO 35.9 IN ADULT, UNSPECIFIED OBESITY TYPE (HCC): ICD-10-CM

## 2025-06-10 DIAGNOSIS — Z00.00 ROUTINE PHYSICAL EXAMINATION: Primary | ICD-10-CM

## 2025-06-10 DIAGNOSIS — E89.0 POSTOPERATIVE HYPOTHYROIDISM: ICD-10-CM

## 2025-06-10 DIAGNOSIS — H26.9 CATARACT, UNSPECIFIED CATARACT TYPE, UNSPECIFIED LATERALITY: ICD-10-CM

## 2025-06-10 DIAGNOSIS — E66.01 CLASS 2 SEVERE OBESITY WITH SERIOUS COMORBIDITY AND BODY MASS INDEX (BMI) OF 35.0 TO 35.9 IN ADULT, UNSPECIFIED OBESITY TYPE (HCC): ICD-10-CM

## 2025-06-10 DIAGNOSIS — I10 PRIMARY HYPERTENSION: ICD-10-CM

## 2025-06-10 DIAGNOSIS — Z85.42 HISTORY OF UTERINE CANCER: ICD-10-CM

## 2025-06-10 DIAGNOSIS — T81.30XA WOUND DEHISCENCE: ICD-10-CM

## 2025-06-10 PROBLEM — S31.109A OPEN WOUND OF ABDOMEN: Status: RESOLVED | Noted: 2024-10-30 | Resolved: 2025-06-10

## 2025-06-10 PROCEDURE — G0439 PPPS, SUBSEQ VISIT: HCPCS | Performed by: INTERNAL MEDICINE

## 2025-06-10 NOTE — PROGRESS NOTES
Patient Office Visit    ASSESSMENT AND PLAN:     1. Routine physical examination  Note: Continue to exercise at least 150 minutes a week and Eat a plant based diet. Please take 2000 IU of vitamin D daily for life to keep your bones strong. Please see your dentist every 6 months.  Continue with regular eye exams.  Highly recommended the pneumonia and the shingles vaccine.  Highly recommended the colonoscopy but patient continues to say no.  She wants to undergo the cataract surgery first and then consider colon cancer screening.  Discussed the importance of early cancer detection.  She understands that    2. Primary hypertension  Note: Controlled on amlodipine    3. Mixed hyperlipidemia  Note: Diet controlled and has declined statins  - Lipid Panel; Future    4. Prediabetes  Note: Diet controlled  - Hemoglobin A1C [E]; Future    5. Postoperative hypothyroidism  Note: Continue levothyroxine  - TSH W Reflex To Free T4 [E]; Future    6. Osteopenia, unspecified location  Note: Continue vitamin D and calcium supplement and has declined bisphosphonates in the past    7. Other iron deficiency anemia  Note: Stable recently and follows with the hematologist    8. Cataract, unspecified cataract type, unspecified laterality  Note: New referral provided  - Ophthalmology Referral - External    9. Wound dehiscence  Note: Has seen the plastic surgeon and no further treatment was recommended    10.  History of uterine last peritoneal cancer  Note: In remission and continue follow-up with the oncologist    11.  Obesity with comorbidities  Note: Comorbidities include hypertension and hyperlipidemia.  Continue with diet and lifestyle modifications    Return to clinic in 6 months      Patient/Caregiver Education: Patient/Caregiver Education: There are no barriers to learning. Medical education done. Outcome: Patient verbalizes understanding. Patient is notified to call with any questions, complications, allergies, or worsening or  changing symptoms.  Patient is to call with any side effects or complications from the treatments as a result of today.      Reviewed Past Medical History and   Problem List[1]    Orders Placed This Encounter   Procedures    Hemoglobin A1C [E]     Standing Status:   Future     Expected Date:   6/10/2025     Expiration Date:   6/10/2026     Release to patient:   Immediate    TSH W Reflex To Free T4 [E]     Standing Status:   Future     Expected Date:   6/10/2025     Expiration Date:   6/10/2026     Release to patient:   Immediate    Lipid Panel     Standing Status:   Future     Expected Date:   6/10/2025     Expiration Date:   6/10/2026     Requested Prescriptions      No prescriptions requested or ordered in this encounter         Zoe Muro DO  CC:  Chief Complaint   Patient presents with    Annual     MAW         HPI:   Viola Lindsay is a 75-year-old female who presents for a routine physical    Hypertension/hyperlipidemia/hypothyroid: Stable  Wound dehiscence: She did see the plastic surgeon and they said even with surgery it might not be repaired so she is just keeping the area clean.  Some days she has discharge another day she is not but she is okay with that    Past Medical History[2]    Past Surgical History[3]    Social History:  Short Social Hx on File[4]  Family History:  Family History[5]  Allergies:  Allergies[6]  Current Meds:  Medications Ordered Prior to Encounter[7]      REVIEW OF SYSTEMS   Constitutional: no fatigue normal energy no weight changes   HENT: normal sinuses and no mouth issues   Eyes: . normal vision no eye pain   Respiratory: normal respirations no cough   Cardiovascular: no CP, or palpitations   Gastrointestinal: normal bowels and no abd pains   Genitourinary:  normal urination no hematuria, no frequency   Musculoskeletal: Has chronic knee pain  Skin: no rashes or skin lesions that are new   Neurological:  no weakness, no numbness, normal gait   Hematological:  no bruises  or bleeding   Psychiatric/Behavioral: normal mood no anxiety normal behavior     /62 (BP Location: Right arm, Patient Position: Sitting, Cuff Size: adult)   Pulse 91   Temp 98 °F (36.7 °C) (Temporal)   Resp 16   Ht 5' 4.02\" (1.626 m)   Wt 199 lb (90.3 kg)   SpO2 96%   BMI 34.14 kg/m²     PHYSICAL EXAM:   Constitutional: Vital signs reviewed as noted, well developed, in no acute distress.   HENT: NCAT, bilateral ear canal and tympanic membrane appear normal  Eyes: pupils reactive bilaterally  Neck: No thyroidmegaly  Cardiovascular: nl s1 s2 no m/r/g  Pulmonary/Chest: CTA bilaterally with no wheezes  Abdominal: Soft NT normal Bowel sounds  Musculoskeletal: Has pain with flexion and extension of the knees  Extremities: no pedal edema   Neurological:  no weakness in UE and LE, reflexes are normal  Skin: no rashes or bruises on visualized skin, not undressed   Psychiatric:normal mood              [1]   Patient Active Problem List  Diagnosis    Prediabetes    Mixed hyperlipidemia    History of pulmonary embolism    Internal hemorrhoids    Osteopenia    Encounter for preoperative screening laboratory testing for COVID-19 virus    H/O total thyroidectomy    Postoperative hypothyroidism    Iron deficiency anemia    Primary hypertension    Status post total replacement of left hip    History of DVT (deep vein thrombosis)    Hypertrophic granulation tissue    Wound dehiscence    Soft tissue abscess of inguinal region    History of peritoneal cancer   [2]   Past Medical History:   Cancer (HCC)    abdominal tumor/ uterine cancer 1999, abdominal wall 2021    High blood pressure    Hypothyroidism    post surgical    Osteoarthritis    bilateral knees    Personal history of antineoplastic chemotherapy    \"chemo wash\" with surgery    PONV (postoperative nausea and vomiting)    post colonoscopy    Prediabetes    Pulmonary embolism (HCC)    7/2021-getting filter put in 8/5/21    Visual impairment    contacts and glasses     Wears glasses   [3]   Past Surgical History:  Procedure Laterality Date    Appendectomy      Appendectomy      Colonoscopy      Finger splint Left     pinky finger    Hysterectomy      total    Other  08/10/2021    Radical resection of right lower abdominal wall and proximal right thigh metastatic carcinoma with sartorius fascia flap transposition and  repair of resultant defect with mesh. Use of SPY to evaluate tissue perfusion.    Other  05/17/2022    Cytoreductive surgery to include resection of left abdominal wall tumor and omentectomy. Hyperthermic intraperitoneal chemotherapy with Cisplatin (100 mg/m2)    Other surgical history  01/2023    total thyriodectomy    Perc placement of ivc filter  08/05/2021    Total abdom hysterectomy  1999    with BSO    Wrist fracture surgery Right 12/2021   [4]   Social History  Socioeconomic History    Marital status: Single   Tobacco Use    Smoking status: Never    Smokeless tobacco: Never    Tobacco comments:     Updated 10/7/24   Vaping Use    Vaping status: Never Used   Substance and Sexual Activity    Alcohol use: Yes     Alcohol/week: 1.0 standard drink of alcohol     Types: 1 Cans of beer per week     Comment: social- few drinks per month    Drug use: Never   Other Topics Concern    Caffeine Concern No    Exercise No     Social Drivers of Health     Food Insecurity: No Food Insecurity (6/10/2025)    NCSS - Food Insecurity     Worried About Running Out of Food in the Last Year: No     Ran Out of Food in the Last Year: No   Transportation Needs: No Transportation Needs (6/10/2025)    NCSS - Transportation     Lack of Transportation: No   Housing Stability: Not At Risk (6/10/2025)    NCSS - Housing/Utilities     Has Housing: Yes     Worried About Losing Housing: No     Unable to Get Utilities: No   [5]   Family History  Problem Relation Age of Onset    Cancer Father         lung CA   [6]   Allergies  Allergen Reactions    Adhesive Tape RASH    Hydrofera Blue 4\"X4\" [Wound  Dressings] RASH   [7]   Current Outpatient Medications on File Prior to Visit   Medication Sig Dispense Refill    LETROZOLE 2.5 MG Oral Tab TAKE 1 TABLET(2.5 MG) BY MOUTH DAILY 90 tablet 0    levothyroxine 100 MCG Oral Tab Take 1 tablet (100 mcg total) by mouth daily. 90 tablet 0    AMLODIPINE 5 MG Oral Tab TAKE 1 TABLET(5 MG) BY MOUTH DAILY 90 tablet 0    calcium carbonate 500 MG Oral Chew Tab Chew 1 tablet (500 mg total) by mouth daily as needed for Heartburn.      NON FORMULARY Take 1 capsule by mouth in the morning. E[employer] Promise supplement.       No current facility-administered medications on file prior to visit.

## 2025-06-10 NOTE — PATIENT INSTRUCTIONS
Continue to exercise at least 150 minutes a week and Eat a plant based diet     Please take 2000 IU of vitamin D daily for life to keep your bones strong    Please see your dentist every 6 months    Continue with regular eye exams    Please have blood work done before you see me      Continue your medicines and continue follow-up with the specialist    I highly recommend the pneumonia and shingles vaccine     I highly recommend a colonoscopy    I have referred you to the ophthalmologist: Dr. Oppenheim    See me back in 6 months

## 2025-06-20 ENCOUNTER — TELEPHONE (OUTPATIENT)
Dept: INTERNAL MEDICINE CLINIC | Facility: CLINIC | Age: 75
End: 2025-06-20

## 2025-06-20 DIAGNOSIS — H26.9 CATARACT, UNSPECIFIED CATARACT TYPE, UNSPECIFIED LATERALITY: Primary | ICD-10-CM

## 2025-06-20 NOTE — TELEPHONE ENCOUNTER
Referral Request - Please ensure insurance is updated and verified.    Reason for the referral: cataract    Has the patient been seen by their PCP for this condition (if not, schedule an appointment): Yes  Is an authorized or pending referral in [facility]? Yes, but Dr. Oppenheim has retired needs new referral   -If yes, notify the patient of the status and/or fax per patient request.    Provider, specialty and contact information:   -Name: [facility]  -Address: [address] [place] [address]  -Phone: [phone]  -Fax: [phone]    Has the patient seen this specialist in the past?: No  CPT Code for this visit:   Dx Code:   Number of visits requested: 3  Date of appointment, if scheduled (route as high priority if within 1 week): None scheduled yet  If requesting out of network provider, please provide reason: OON      Notify the patient that referrals can take up to 1 week or sometimes longer for authorization.

## 2025-07-10 DIAGNOSIS — I10 PRIMARY HYPERTENSION: ICD-10-CM

## 2025-07-10 RX ORDER — AMLODIPINE BESYLATE 5 MG/1
5 TABLET ORAL DAILY
Qty: 90 TABLET | Refills: 0 | Status: SHIPPED | OUTPATIENT
Start: 2025-07-10

## 2025-07-10 NOTE — TELEPHONE ENCOUNTER
AMLODIPINE BESYLATE 5MG TABLETS          Will file in chart as: AMLODIPINE 5 MG Oral Tab    Sig: TAKE 1 TABLET(5 MG) BY MOUTH DAILY    Disp: 90 tablet    Refills: 0 (Pharmacy requested: Not specified)    Start: 7/10/2025    Class: Normal    Non-formulary For: Primary hypertension    Last ordered: 2 months ago (4/13/2025) by Zoe Muro DO    Last refill: 4/13/2025    Rx #: [id number]    Hypertension Medications Protocol Iuzxia99/10/2025 03:13 AM   Protocol Details CMP or BMP in past 12 months    Last BP reading less than 140/90    In person appointment or virtual visit in the past 12 mos or appointment in next 3 mos    EGFRCR or GFRNAA > 50    Medication is active on med list      LOV 6/10/25  RTC 6 months   Filled 4/13/25 90 tabs 0 refills   No future appointments.

## 2025-07-13 NOTE — CONSULTS
[facility]  CONSULT     Chelsy Sánchez Patient Status:  Inpatient    6/3/1950 MRN [id number]   [facility] 7NE-A Attending Shavon Navarro MD   Hosp Day # 0 PCP Zoe Javier DO     Reason for consult: med mgt    Requested b Systems:   A comprehensive 6 point review of systems was completed. Pertinent positives and negatives noted in the HPI.     Physical Exam:    /51 (BP Location: Left arm)   Pulse 79   Temp 98.2 °F (36.8 °C) (Oral)   Resp 24   Ht 5' 5\" (1.651 m)   W strength anticoagulation  · CODE status: full  · Cox: yes    Plan of care discussed with patient and rn    Mani Garza MD  8/10/2021 Alert-The patient is alert, awake and responds to voice. The patient is oriented to time, place, and person. The triage nurse is able to obtain subjective information.

## 2025-08-01 ENCOUNTER — OFFICE VISIT (OUTPATIENT)
Dept: ORTHOPEDICS CLINIC | Facility: CLINIC | Age: 75
End: 2025-08-01

## 2025-08-01 DIAGNOSIS — M17.11 PRIMARY OSTEOARTHRITIS OF RIGHT KNEE: Primary | ICD-10-CM

## 2025-08-01 PROCEDURE — 20610 DRAIN/INJ JOINT/BURSA W/O US: CPT | Performed by: PHYSICIAN ASSISTANT

## 2025-08-01 RX ORDER — TRIAMCINOLONE ACETONIDE 40 MG/ML
40 INJECTION, SUSPENSION INTRA-ARTICULAR; INTRAMUSCULAR ONCE
Status: COMPLETED | OUTPATIENT
Start: 2025-08-01 | End: 2025-08-01

## 2025-08-01 RX ADMIN — TRIAMCINOLONE ACETONIDE 40 MG: 40 INJECTION, SUSPENSION INTRA-ARTICULAR; INTRAMUSCULAR at 07:20:00

## 2025-08-18 RX ORDER — LEVOTHYROXINE SODIUM 100 UG/1
100 TABLET ORAL DAILY
Qty: 90 TABLET | Refills: 1 | Status: SHIPPED | OUTPATIENT
Start: 2025-08-18

## (undated) DIAGNOSIS — C55 METASTASIS FROM MALIGNANT NEOPLASM OF UTERUS (HCC): ICD-10-CM

## (undated) DIAGNOSIS — C78.6 PERITONEAL CARCINOMATOSIS (HCC): Primary | ICD-10-CM

## (undated) DIAGNOSIS — C79.89 SECONDARY MALIGNANT NEOPLASM OF SOFT TISSUES OF ABDOMEN (HCC): ICD-10-CM

## (undated) DIAGNOSIS — C79.9 METASTASIS FROM MALIGNANT NEOPLASM OF UTERUS (HCC): ICD-10-CM

## (undated) DEVICE — SOL  .9 1000ML BTL

## (undated) DEVICE — PAD SACRAL SPAN AID

## (undated) DEVICE — HOOD: Brand: FLYTE

## (undated) DEVICE — #15 STERILE STAINLESS BLADE: Brand: STERILE STAINLESS BLADES

## (undated) DEVICE — 3M™ TEGADERM™ TRANSPARENT FILM DRESSING, 1626W, 4 IN X 4-3/4 IN (10 CM X 12 CM), 50 EACH/CARTON, 4 CARTON/CASE: Brand: 3M™ TEGADERM™

## (undated) DEVICE — SUT ETHIBOND 5 V-37 MB66G

## (undated) DEVICE — LIGHT HANDLE

## (undated) DEVICE — Device

## (undated) DEVICE — GOWN,PREVENTION PLUS,XLARGE,STERILE: Brand: MEDLINE

## (undated) DEVICE — CHEMOTHERAPY CONTAINER,SLIDE LID, YELLOW: Brand: SHARPSAFETY

## (undated) DEVICE — ALCOHOL 70% 4 OZ

## (undated) DEVICE — NON-ADHERENT PAD PREPACK: Brand: TELFA

## (undated) DEVICE — PROXIMATE SKIN STAPLERS (35 WIDE) CONTAINS 35 STAINLESS STEEL STAPLES (FIXED HEAD): Brand: PROXIMATE

## (undated) DEVICE — STERILE POLYISOPRENE POWDER-FREE SURGICAL GLOVES: Brand: PROTEXIS

## (undated) DEVICE — SPONGE: SPECIALTY PEANUT XR 100/CS: Brand: MEDICAL ACTION INDUSTRIES

## (undated) DEVICE — ESSENTIAL SHOULDER SLING

## (undated) DEVICE — CAUTERY TIP BLADE EXTENSION

## (undated) DEVICE — SUTURE VICRYL 2-0 SH

## (undated) DEVICE — DRAPE THERMAL BASIN

## (undated) DEVICE — SUTURE PROLENE 3-0 SH

## (undated) DEVICE — DISPOSABLE BIPOLAR FORCEPS 4" (10.2CM) JEWELERS, STRAIGHT 0.4MM TIP AND 12 FT. (3.6M) CABLE: Brand: KIRWAN

## (undated) DEVICE — SUTURE PROLENE 4-0 SH

## (undated) DEVICE — HARMONIC FOCUS SHEARS 9CM LENGTH + ADAPTIVE TISSUE TECHNOLOGY FOR USE WITH BLUE HAND PIECE ONLY: Brand: HARMONIC FOCUS

## (undated) DEVICE — GOWN,SIRUS,FABRIC-REINFORCED,X-LARGE: Brand: MEDLINE

## (undated) DEVICE — HOOD, PEEL-AWAY: Brand: FLYTE

## (undated) DEVICE — SOL NACL IRRIG 0.9% 1000ML BTL

## (undated) DEVICE — GAUZE SPONGES,12 PLY: Brand: CURITY

## (undated) DEVICE — DRAPE,U/SHT,SPLIT,FILM,60X84,STERILE: Brand: MEDLINE

## (undated) DEVICE — SUT PDS II 1 TP-1 Z880G

## (undated) DEVICE — SUTURE VICRYL 3-0 SH

## (undated) DEVICE — Device: Brand: DRILL BIT

## (undated) DEVICE — PADDING CAST COTTON STER 3

## (undated) DEVICE — ABDUCTION PILLOW SMALL 6X12X18

## (undated) DEVICE — GOWN,PREVENTION PLUS,LARGE,STERILE: Brand: MEDLINE

## (undated) DEVICE — NON-ADHERENT STRIPS,OIL EMULSION: Brand: CURITY

## (undated) DEVICE — SUT PROLENE 1 CT-1 8425H

## (undated) DEVICE — STERILE HOOK LOCK LATEX FREE ELASTIC BANDAGE 2INX5YD: Brand: HOOK LOCK™

## (undated) DEVICE — MLPD DISPOSABLE PAD (6' ROLL) 3 ROLLS: Brand: SCHAERER MEDICAL USA

## (undated) DEVICE — SLEEVE KENDALL SCD EXPRESS MED

## (undated) DEVICE — MEDI-VAC SUCTION HANDLE REGULAR CAPACITY: Brand: CARDINAL HEALTH

## (undated) DEVICE — REM POLYHESIVE ADULT PATIENT RETURN ELECTRODE: Brand: VALLEYLAB

## (undated) DEVICE — UPPER EXTREMITY CDS-LF: Brand: MEDLINE INDUSTRIES, INC.

## (undated) DEVICE — SUTURE ETHIBOND EXCEL 1-0

## (undated) DEVICE — 2000CC GUARDIAN II: Brand: GUARDIAN

## (undated) DEVICE — SHEET, DRAPE, SPLIT, STERILE: Brand: MEDLINE

## (undated) DEVICE — WRAP COOLING HIP W/ICE PILLOW

## (undated) DEVICE — SUTURE ETHILON 3-0 PS-2

## (undated) DEVICE — XENMATRIX AB SURGICAL GRAFT, 10 CM X 15 CM
Type: IMPLANTABLE DEVICE | Site: ABDOMEN | Status: NON-FUNCTIONAL
Brand: XENMATRIX

## (undated) DEVICE — SUT MONOCRYL 4-0 PS-2 Y496G

## (undated) DEVICE — TIP CLEANER: Brand: VALLEYLAB

## (undated) DEVICE — 40580 - THE PINK PAD - ADVANCED TRENDELENBURG POSITIONING KIT: Brand: 40580 - THE PINK PAD - ADVANCED TRENDELENBURG POSITIONING KIT

## (undated) DEVICE — HEAD AND NECK CDS-LF: Brand: MEDLINE INDUSTRIES, INC.

## (undated) DEVICE — 3M™ STERI-STRIP™ REINFORCED ADHESIVE SKIN CLOSURES, R1548, 1 IN X 5 IN (25 MM X 125 MM), 4 STRIPS/ENVELOPE: Brand: 3M™ STERI-STRIP™

## (undated) DEVICE — SUT SILK 3-0 SH C013D

## (undated) DEVICE — FEMORAL CANAL BRUSH, IRRIGATION/SUCTION

## (undated) DEVICE — SUTURE VICRYL 0

## (undated) DEVICE — SOLUTION  .9 3000ML

## (undated) DEVICE — SUT SILK 2-0 SH K833H

## (undated) DEVICE — ARGON HANDSET: Brand: VALLEYLAB

## (undated) DEVICE — 3M™ STERI-STRIP™ REINFORCED ADHESIVE SKIN CLOSURES, R1547, 1/2 IN X 4 IN (12 MM X 100 MM), 6 STRIPS/ENVELOPE: Brand: 3M™ STERI-STRIP™

## (undated) DEVICE — PADDING CAST COTTON  4

## (undated) DEVICE — BLADE 24 SS SRG STRL

## (undated) DEVICE — MARKER SKIN 2 TIP

## (undated) DEVICE — SUTURE PROLENE 2-0 MH

## (undated) DEVICE — STERILE POLYISOPRENE POWDER-FREE SURGICAL GLOVES WITH EMOLLIENT COATING: Brand: PROTEXIS

## (undated) DEVICE — SPONGE RAYTEC 4X4 RF DETECT

## (undated) DEVICE — SUT VICRYL 2-0 CP-1 J266H

## (undated) DEVICE — SUT PROLENE 4-0 RB-1 8557H

## (undated) DEVICE — GLOVE SURG NEOLON SZ 7

## (undated) DEVICE — C-ARM: Brand: UNBRANDED

## (undated) DEVICE — TOTAL HIP CDS: Brand: MEDLINE INDUSTRIES, INC.

## (undated) DEVICE — DRAPE HALF 40X58 DYNJP2410

## (undated) DEVICE — COVER,MAYO STAND,STERILE: Brand: MEDLINE

## (undated) DEVICE — ABSORBABLE HEMOSTAT (OXIDIZED REGENERATED CELLULOSE, U.S.P.): Brand: SURGICEL

## (undated) DEVICE — NEEDLE SPINAL 18X3-1/2 PINK.

## (undated) DEVICE — MEGADYNE ELECTRODE ADULT PT RT

## (undated) DEVICE — SYRINGE 30ML LL TIP

## (undated) DEVICE — HIPEC PACK: Brand: MEDLINE INDUSTRIES, INC.

## (undated) DEVICE — DISPOSABLE TOURNIQUET CUFF SINGLE BLADDER, DUAL PORT AND QUICK CONNECT CONNECTOR: Brand: COLOR CUFF

## (undated) DEVICE — PROVE COVER: Brand: UNBRANDED

## (undated) DEVICE — DRAIN RELIAVAC 100CC

## (undated) DEVICE — SUT VICRYL 2-0 SH J417H

## (undated) DEVICE — SUTURE ETHIBOND 2-0 CT-2

## (undated) DEVICE — HARMONIC 1100 SHEARS, 20CM SHAFT LENGTH: Brand: HARMONIC

## (undated) DEVICE — SPLINT PLASTER 4

## (undated) DEVICE — SUTURE MONOCRYL 4-0 PS-2

## (undated) DEVICE — SUT PROLENE 2-0 MH 8853H

## (undated) DEVICE — MEDI-VAC NON-CONDUCTIVE SUCTION TUBING: Brand: CARDINAL HEALTH

## (undated) DEVICE — Device: Brand: POWER-FLO®

## (undated) DEVICE — SUTURE MONOCRYL 3-0 PS-2

## (undated) DEVICE — DRAIN ROUND HUBLESS 15FR

## (undated) DEVICE — EXOFIN TISSUE ADHESIVE 1.0ML

## (undated) DEVICE — SUT VICRYL 3-0 SH J416H

## (undated) DEVICE — UNIVERSAL STERIBUMP® STERILE (5/CASE): Brand: UNIVERSAL STERIBUMP®

## (undated) DEVICE — INTENDED TO BE USED TO OCCLUDE, RETRACT AND IDENTIFY ARTERIES, VEINS, TENDONS AND NERVES IN SURGICAL PROCEDURES: Brand: STERION®  VESSEL LOOP

## (undated) DEVICE — SUTURE ETHIBOND EXCEL 2-0 SH

## (undated) DEVICE — SUT TICRON 5 HOS-14 3027-79

## (undated) DEVICE — SUT SILK 2-0 A305H

## (undated) DEVICE — SUTURE SILK 3-0 SH

## (undated) DEVICE — GLOVE SURG NEOLON SZ 6-1/2

## (undated) DEVICE — DRESSING FOAM TOPIFOAM

## (undated) DEVICE — SUT SILK 3-0 SH K832H

## (undated) DEVICE — STERILE SYNTHETIC POLYISOPRENE POWDER-FREE SURGICAL GLOVES WITH HYDROGEL COATING, SMOOTH FINISH, STRAIGHT FINGER: Brand: PROTEXIS

## (undated) DEVICE — UNDYED BRAIDED (POLYGLACTIN 910), SYNTHETIC ABSORBABLE SUTURE: Brand: COATED VICRYL

## (undated) DEVICE — BNDG COHESIVE W4INXL5YD TAN E

## (undated) DEVICE — CSTM UNIVERSAL DRAPE PK: Brand: MEDLINE INDUSTRIES, INC.

## (undated) DEVICE — GOWN AERO CHROME XXL

## (undated) DEVICE — SUT ETHIBOND 2 V-37 MX69G

## (undated) DEVICE — BLANKET HYPOTHERMIA ADULT

## (undated) DEVICE — SCD SLEEVE KNEE HI BLEND

## (undated) DEVICE — E-Z BUTTON SWITCH PENCIL

## (undated) DEVICE — SUT SILK 3-0 A304H

## (undated) DEVICE — INTENDED TO AID IN THE PASSING OF SUTURES THROUGH BONE AND SOFT TISSUE DURING ORTHOPEDIC SURGERY: Brand: HOFFEE SUTURE RETRIEVER

## (undated) DEVICE — SUT VICRYL 0 CP-1 J267H

## (undated) DEVICE — PREMIUM WET SKIN PREP TRAY: Brand: MEDLINE INDUSTRIES, INC.

## (undated) DEVICE — HEMOCLIP HORIZON SM 001200

## (undated) DEVICE — SUTURE PROLENE 5-0 RB-1

## (undated) DEVICE — VIAL LABORATORY SPY

## (undated) DEVICE — HEMOCLIP HORIZON LG 004200

## (undated) DEVICE — 3M™ IOBAN™ 2 ANTIMICROBIAL INCISE DRAPE 6648EZ: Brand: IOBAN™ 2

## (undated) DEVICE — PROBE 8225101 5PK STD PRASS FL TIP ROHS

## (undated) DEVICE — SUT PROLENE 5-0 RB-1 8556H

## (undated) DEVICE — GAUZE STERILE 4X4 12PLY

## (undated) DEVICE — DERMABOND CLOSURE 0.7ML TOPICL

## (undated) DEVICE — TOWEL SURG OR 17X30IN BLUE

## (undated) DEVICE — SUT SILK 0 A306H

## (undated) DEVICE — SUT PROLENE 4-0 PS-2 8682G

## (undated) DEVICE — SUT SILK 0 FSL 680H

## (undated) DEVICE — SUTURE SILK 2-0 SH

## (undated) DEVICE — Device: Brand: STABLECUT®

## (undated) DEVICE — PAD SACRAL PREMIUM 12X12X1

## (undated) DEVICE — SUTURE VICRYL 0 CP-2

## (undated) DEVICE — DRAPE EQUIPMENT INTRATEMP THER

## (undated) DEVICE — CHLORAPREP 26ML APPLICATOR

## (undated) DEVICE — CUSA® EXCEL 23KHZ 2.64MM MACROTIP™ TIP: Brand: CUSA® EXCEL MACROTIP™

## (undated) DEVICE — TUBING MEGADYNE SPECULUM

## (undated) DEVICE — HEMOCLIP HORIZON MED 002200

## (undated) DEVICE — BIT DRL 30MM 3.2MM RNGLC ACTB

## (undated) DEVICE — ELECTRODE ESURG 2.75IN EZ CLN

## (undated) DEVICE — BANDAGE ROLL,100% COTTON, 6 PLY, LARGE: Brand: KERLIX

## (undated) DEVICE — PROCEDURE KIT FOR HT-2000

## (undated) DEVICE — LAMINECTOMY ARM CRADLE FOAM POSITIONER: Brand: CARDINAL HEALTH

## (undated) DEVICE — 3M™ IOBAN™ 2 ANTIMICROBIAL INCISE DRAPE 6651EZ: Brand: IOBAN™ 2

## (undated) DEVICE — SUT PROLENE 3-0 SH 8522H

## (undated) DEVICE — SPECIMAN CONTAINER 4OZ STERILE

## (undated) DEVICE — AIRWAY ENDO  TRIVANTAGE 7MM

## (undated) DEVICE — WIRE K DEP DVR 1.6 KW062SS: Type: IMPLANTABLE DEVICE | Site: WRIST

## (undated) DEVICE — SURGICAL SCRB HIBICLENS 4OZ

## (undated) DEVICE — SUTURE SILK 2-0

## (undated) DEVICE — THROAT PACKING X-RAY DETECT

## (undated) NOTE — LETTER
23  [facility]   Pre-Operative Clearance Request    Patient Name:   Bola Barrera             :   6/3/1950    Surgeon: Dr. Alix Crook             Date of Surgery: 23     Surgical Procedure: LEFT POSTERIOR CEMENTED TOTAL HIP ARTHROPLASTY. Please complete all of the following 2-3 weeks PRIOR TO your scheduled surgery to avoid potential cancellation. [x]  History and Physical        [x]  Medical  Clearance                     Required pre-op testing to be ordered:                        [x]  CBC W/Diff                                                                   [x]  CMP                                                                                    [x]  PT/INR    [x]  PTT     [x]  Type and Screen                    **Please fax test results, H&P, and clearance to [phone] and to [facility] at [phone]**

## (undated) NOTE — LETTER
[facility]  [facility], [address] [facility] [address]  [facility]: [phone]  FAX: [phone]    Medical Clearance Request    Zoe Burt DO  [address]  [place] [address]  [place] [place]    [facility]

## (undated) NOTE — LETTER
24    Patient: Viola Lindsay  : 6/3/1950 Visit date: 2024    Dear  Zoe Muro,     Thank you for referring Viola Lindsay to my practice.  Please find my assessment and plan below.    Assessment   1. Hypertrophic granulation tissue        Plan     The patient's right groin wound is well-healing.  No further hypertrophic granulation tissue.  No drainable fluid collection.  Local wound care recommendations discussed.  The patient will follow-up in 2 weeks for ongoing wound surveillance.  The patient was provided ample opportunity to ask questions.  All of the patient's questions were answered in detail.  The patient voiced understanding of the care plan.       Sincerely,       Tony Stark MD   CC:   No Recipients

## (undated) NOTE — LETTER
Patient Name: Champ Staton        : 6/3/1950       Medical Record #: [id number]    CONSENT FOR PROCEDURES/SEDATION    Date: 2021       Time: 11:30 AM        1.  I authorize the performance upon Viola Lindsay the following:    _________core bio

## (undated) NOTE — LETTER
To:  Dr. Soren Barahona  Date:  8/2/2021  Fax #: [phone]   Patient Name: Maricarmen Montgomery / Sex: [birth year]-S: 70 y  female  Phone:  [phone] CSN: [id number]     Medical Records: [id number]    Clearance for Surgery Requested by Surgeon

## (undated) NOTE — LETTER
[facility]  Phone: [phone]  Right Fax: [phone]  EVALUATION REQUEST PREOP    Sent By:  Kayla Oneal RN Date: 8/4/21    Patient Name: Lizbteh Ling  Surgery Date: 8/10/2021    CSN: [id number]  Medical Record: [id number]

## (undated) NOTE — LETTER
24    Patient: Viola Lindsay  : 6/3/1950 Visit date: 2024    Dear  Zoe Muro DO    Thank you for referring Viola Lindsay to my practice.  Please find my assessment and plan below.     Assessment   1. Hypertrophic granulation tissue          Plan     The patient has an open wound with hypertrophic granulation tissue and an area of prior surgical drain.    Sharp debridement and chemical cauterization performed in the office uneventfully.    Wound care instructions discussed with patient and demonstrated.    The patient will follow-up in my office at weekly intervals until the wound is completely healed.  She will see my physician assistant alternating with appointments with me.    The patient was provided ample opportunity to ask questions.  All of the patient's questions were answered in detail.  The patient voiced understanding of the care plan.      Sincerely,       Tony Stark MD   CC:   No Recipients

## (undated) NOTE — Clinical Note
Hi Ji Lima today to discuss her concerns about weight gain post-TT, she is reassured. I would probably start her on LT4 100mcg post-op, I told her we can see her in clinic within a few weeks of her surgery to start.  Thanks! -Jaron Resendez